# Patient Record
Sex: MALE | Race: WHITE | Employment: OTHER | URBAN - METROPOLITAN AREA
[De-identification: names, ages, dates, MRNs, and addresses within clinical notes are randomized per-mention and may not be internally consistent; named-entity substitution may affect disease eponyms.]

---

## 2022-04-11 ENCOUNTER — HOSPITAL ENCOUNTER (INPATIENT)
Age: 67
LOS: 6 days | Discharge: HOME HEALTH | DRG: 871 | End: 2022-04-17
Attending: EMERGENCY MEDICINE | Admitting: INTERNAL MEDICINE
Payer: MEDICARE

## 2022-04-11 ENCOUNTER — APPOINTMENT (OUTPATIENT)
Dept: GENERAL RADIOLOGY | Age: 67
DRG: 871 | End: 2022-04-11
Attending: EMERGENCY MEDICINE
Payer: MEDICARE

## 2022-04-11 DIAGNOSIS — J90 PLEURAL EFFUSION: ICD-10-CM

## 2022-04-11 DIAGNOSIS — A41.9 SEPSIS, DUE TO UNSPECIFIED ORGANISM, UNSPECIFIED WHETHER ACUTE ORGAN DYSFUNCTION PRESENT (HCC): Primary | ICD-10-CM

## 2022-04-11 DIAGNOSIS — E10.49 TYPE 1 DIABETES MELLITUS WITH OTHER NEUROLOGIC COMPLICATION (HCC): ICD-10-CM

## 2022-04-11 DIAGNOSIS — J69.0 ASPIRATION PNEUMONIA OF LEFT LUNG, UNSPECIFIED ASPIRATION PNEUMONIA TYPE, UNSPECIFIED PART OF LUNG (HCC): ICD-10-CM

## 2022-04-11 LAB
ALBUMIN SERPL-MCNC: 3.6 G/DL (ref 3.5–5)
ALBUMIN/GLOB SERPL: 0.9 {RATIO} (ref 1.1–2.2)
ALP SERPL-CCNC: 156 U/L (ref 45–117)
ALT SERPL-CCNC: 51 U/L (ref 12–78)
ANION GAP SERPL CALC-SCNC: 6 MMOL/L (ref 5–15)
AST SERPL-CCNC: 26 U/L (ref 15–37)
BASOPHILS # BLD: 0 K/UL (ref 0–0.1)
BASOPHILS NFR BLD: 0 % (ref 0–1)
BILIRUB SERPL-MCNC: 0.4 MG/DL (ref 0.2–1)
BUN SERPL-MCNC: 39 MG/DL (ref 6–20)
BUN/CREAT SERPL: 34 (ref 12–20)
CALCIUM SERPL-MCNC: 9.4 MG/DL (ref 8.5–10.1)
CALCULATED R AXIS, ECG10: 71 DEGREES
CALCULATED T AXIS, ECG11: 126 DEGREES
CHLORIDE SERPL-SCNC: 101 MMOL/L (ref 97–108)
CO2 SERPL-SCNC: 26 MMOL/L (ref 21–32)
CREAT SERPL-MCNC: 1.16 MG/DL (ref 0.7–1.3)
DIAGNOSIS, 93000: NORMAL
DIFFERENTIAL METHOD BLD: ABNORMAL
EOSINOPHIL # BLD: 0.2 K/UL (ref 0–0.4)
EOSINOPHIL NFR BLD: 2 % (ref 0–7)
ERYTHROCYTE [DISTWIDTH] IN BLOOD BY AUTOMATED COUNT: 14.6 % (ref 11.5–14.5)
GLOBULIN SER CALC-MCNC: 4.2 G/DL (ref 2–4)
GLUCOSE BLD STRIP.AUTO-MCNC: 170 MG/DL (ref 65–117)
GLUCOSE SERPL-MCNC: 191 MG/DL (ref 65–100)
HCT VFR BLD AUTO: 33 % (ref 36.6–50.3)
HGB BLD-MCNC: 11.1 G/DL (ref 12.1–17)
IMM GRANULOCYTES # BLD AUTO: 0 K/UL (ref 0–0.04)
IMM GRANULOCYTES NFR BLD AUTO: 0 % (ref 0–0.5)
LACTATE SERPL-SCNC: 1 MMOL/L (ref 0.4–2)
LYMPHOCYTES # BLD: 1.8 K/UL (ref 0.8–3.5)
LYMPHOCYTES NFR BLD: 21 % (ref 12–49)
MCH RBC QN AUTO: 29 PG (ref 26–34)
MCHC RBC AUTO-ENTMCNC: 33.6 G/DL (ref 30–36.5)
MCV RBC AUTO: 86.2 FL (ref 80–99)
MONOCYTES # BLD: 0.4 K/UL (ref 0–1)
MONOCYTES NFR BLD: 5 % (ref 5–13)
NEUTS SEG # BLD: 6.2 K/UL (ref 1.8–8)
NEUTS SEG NFR BLD: 72 % (ref 32–75)
NRBC # BLD: 0 K/UL (ref 0–0.01)
NRBC BLD-RTO: 0 PER 100 WBC
PLATELET # BLD AUTO: 232 K/UL (ref 150–400)
PMV BLD AUTO: 12.7 FL (ref 8.9–12.9)
POTASSIUM SERPL-SCNC: 4 MMOL/L (ref 3.5–5.1)
PROCALCITONIN SERPL-MCNC: 0.09 NG/ML
PROT SERPL-MCNC: 7.8 G/DL (ref 6.4–8.2)
Q-T INTERVAL, ECG07: 336 MS
QRS DURATION, ECG06: 70 MS
QTC CALCULATION (BEZET), ECG08: 446 MS
RBC # BLD AUTO: 3.83 M/UL (ref 4.1–5.7)
SERVICE CMNT-IMP: ABNORMAL
SODIUM SERPL-SCNC: 133 MMOL/L (ref 136–145)
TROPONIN-HIGH SENSITIVITY: 43 NG/L (ref 0–76)
VENTRICULAR RATE, ECG03: 106 BPM
WBC # BLD AUTO: 8.6 K/UL (ref 4.1–11.1)

## 2022-04-11 PROCEDURE — 87040 BLOOD CULTURE FOR BACTERIA: CPT

## 2022-04-11 PROCEDURE — 83605 ASSAY OF LACTIC ACID: CPT

## 2022-04-11 PROCEDURE — 80053 COMPREHEN METABOLIC PANEL: CPT

## 2022-04-11 PROCEDURE — 96374 THER/PROPH/DIAG INJ IV PUSH: CPT

## 2022-04-11 PROCEDURE — 84145 PROCALCITONIN (PCT): CPT

## 2022-04-11 PROCEDURE — 93005 ELECTROCARDIOGRAM TRACING: CPT

## 2022-04-11 PROCEDURE — 87186 SC STD MICRODIL/AGAR DIL: CPT

## 2022-04-11 PROCEDURE — 94761 N-INVAS EAR/PLS OXIMETRY MLT: CPT

## 2022-04-11 PROCEDURE — 99285 EMERGENCY DEPT VISIT HI MDM: CPT

## 2022-04-11 PROCEDURE — 85025 COMPLETE CBC W/AUTO DIFF WBC: CPT

## 2022-04-11 PROCEDURE — 84484 ASSAY OF TROPONIN QUANT: CPT

## 2022-04-11 PROCEDURE — 36415 COLL VENOUS BLD VENIPUNCTURE: CPT

## 2022-04-11 PROCEDURE — 65270000029 HC RM PRIVATE

## 2022-04-11 PROCEDURE — 82962 GLUCOSE BLOOD TEST: CPT

## 2022-04-11 PROCEDURE — 74011250636 HC RX REV CODE- 250/636: Performed by: EMERGENCY MEDICINE

## 2022-04-11 PROCEDURE — 71045 X-RAY EXAM CHEST 1 VIEW: CPT

## 2022-04-11 PROCEDURE — 74011000258 HC RX REV CODE- 258: Performed by: EMERGENCY MEDICINE

## 2022-04-11 RX ORDER — TRAZODONE HYDROCHLORIDE 50 MG/1
125 TABLET ORAL ONCE
Status: COMPLETED | OUTPATIENT
Start: 2022-04-11 | End: 2022-04-12

## 2022-04-11 RX ORDER — ONDANSETRON 4 MG/1
4 TABLET, ORALLY DISINTEGRATING ORAL
Status: DISCONTINUED | OUTPATIENT
Start: 2022-04-11 | End: 2022-04-17 | Stop reason: HOSPADM

## 2022-04-11 RX ORDER — ACETAMINOPHEN 325 MG/1
650 TABLET ORAL
Status: DISCONTINUED | OUTPATIENT
Start: 2022-04-11 | End: 2022-04-17 | Stop reason: HOSPADM

## 2022-04-11 RX ORDER — ONDANSETRON 2 MG/ML
4 INJECTION INTRAMUSCULAR; INTRAVENOUS
Status: DISCONTINUED | OUTPATIENT
Start: 2022-04-11 | End: 2022-04-17 | Stop reason: HOSPADM

## 2022-04-11 RX ORDER — SODIUM CHLORIDE 0.9 % (FLUSH) 0.9 %
5-40 SYRINGE (ML) INJECTION EVERY 8 HOURS
Status: DISCONTINUED | OUTPATIENT
Start: 2022-04-11 | End: 2022-04-17 | Stop reason: HOSPADM

## 2022-04-11 RX ORDER — INSULIN LISPRO 100 [IU]/ML
INJECTION, SOLUTION INTRAVENOUS; SUBCUTANEOUS
Status: DISCONTINUED | OUTPATIENT
Start: 2022-04-12 | End: 2022-04-17 | Stop reason: HOSPADM

## 2022-04-11 RX ORDER — VANCOMYCIN 2 GRAM/500 ML IN 0.9 % SODIUM CHLORIDE INTRAVENOUS
2000 ONCE
Status: DISCONTINUED | OUTPATIENT
Start: 2022-04-11 | End: 2022-04-11

## 2022-04-11 RX ORDER — VANCOMYCIN/0.9 % SOD CHLORIDE 1.5G/250ML
1500 PLASTIC BAG, INJECTION (ML) INTRAVENOUS ONCE
Status: COMPLETED | OUTPATIENT
Start: 2022-04-12 | End: 2022-04-12

## 2022-04-11 RX ORDER — SODIUM CHLORIDE 0.9 % (FLUSH) 0.9 %
5-10 SYRINGE (ML) INJECTION AS NEEDED
Status: DISCONTINUED | OUTPATIENT
Start: 2022-04-11 | End: 2022-04-17 | Stop reason: HOSPADM

## 2022-04-11 RX ORDER — FACIAL-BODY WIPES
10 EACH TOPICAL DAILY PRN
Status: DISCONTINUED | OUTPATIENT
Start: 2022-04-11 | End: 2022-04-17 | Stop reason: HOSPADM

## 2022-04-11 RX ORDER — SODIUM CHLORIDE 0.9 % (FLUSH) 0.9 %
5-40 SYRINGE (ML) INJECTION AS NEEDED
Status: DISCONTINUED | OUTPATIENT
Start: 2022-04-11 | End: 2022-04-17 | Stop reason: HOSPADM

## 2022-04-11 RX ORDER — MAGNESIUM SULFATE 100 %
4 CRYSTALS MISCELLANEOUS AS NEEDED
Status: DISCONTINUED | OUTPATIENT
Start: 2022-04-11 | End: 2022-04-17 | Stop reason: HOSPADM

## 2022-04-11 RX ORDER — ACETAMINOPHEN 650 MG/1
650 SUPPOSITORY RECTAL
Status: DISCONTINUED | OUTPATIENT
Start: 2022-04-11 | End: 2022-04-17 | Stop reason: HOSPADM

## 2022-04-11 RX ADMIN — PIPERACILLIN SODIUM AND TAZOBACTAM SODIUM 4.5 G: 4; .5 INJECTION, POWDER, LYOPHILIZED, FOR SOLUTION INTRAVENOUS at 20:45

## 2022-04-11 RX ADMIN — SODIUM CHLORIDE 1000 ML: 9 INJECTION, SOLUTION INTRAVENOUS at 20:33

## 2022-04-11 NOTE — ED TRIAGE NOTES
He arrives in a wheelchair with his wife. He was at [facility] for hours waiting for a room. He was dx there with pneumonia. He has a hx of a stroke, with left sided deficits. He has a PEG tube. He vomited 3 times this am after having low blood sugar and getting treated with orange juice.

## 2022-04-11 NOTE — ED PROVIDER NOTES
72-year-old male with a history of diabetes, atrial fibrillation on Eliquis, prior stroke and hemorrhagic stroke presents with a chief complaint of vomiting. Patient's wife states that he woke up in the middle of the night with low blood sugar last night. She gave him some orange juice. He vomited multiple times after that. This morning he had of temperature of 99 °F temporally. He was seen at [facility] prior to arrival and had CT scanning of his chest and abdomen. There was concern for aspiration pneumonia on the CT of the chest.  Patient endorses a cough but denies being short of breath. No past medical history on file. No past surgical history on file. No family history on file. Social History     Socioeconomic History    Marital status:      Spouse name: Not on file    Number of children: Not on file    Years of education: Not on file    Highest education level: Not on file   Occupational History    Not on file   Tobacco Use    Smoking status: Not on file    Smokeless tobacco: Not on file   Substance and Sexual Activity    Alcohol use: Not on file    Drug use: Not on file    Sexual activity: Not on file   Other Topics Concern    Not on file   Social History Narrative    Not on file     Social Determinants of Health     Financial Resource Strain:     Difficulty of Paying Living Expenses: Not on file   Food Insecurity:     Worried About Running Out of Food in the Last Year: Not on file    Nickolas of Food in the Last Year: Not on file   Transportation Needs:     Lack of Transportation (Medical): Not on file    Lack of Transportation (Non-Medical):  Not on file   Physical Activity:     Days of Exercise per Week: Not on file    Minutes of Exercise per Session: Not on file   Stress:     Feeling of Stress : Not on file   Social Connections:     Frequency of Communication with Friends and Family: Not on file    Frequency of Social Gatherings with Friends and Family: Not on file    Attends Holiness Services: Not on file    Active Member of Clubs or Organizations: Not on file    Attends Club or Organization Meetings: Not on file    Marital Status: Not on file   Intimate Partner Violence:     Fear of Current or Ex-Partner: Not on file    Emotionally Abused: Not on file    Physically Abused: Not on file    Sexually Abused: Not on file   Housing Stability:     Unable to Pay for Housing in the Last Year: Not on file    Number of [place] in the Last Year: Not on file    Unstable Housing in the Last Year: Not on file         ALLERGIES: Patient has no known allergies. Review of Systems   Constitutional: Positive for chills. HENT: Negative for rhinorrhea. Respiratory: Positive for cough. Cardiovascular: Negative for chest pain. Gastrointestinal: Positive for abdominal pain and vomiting. Genitourinary: Negative for dysuria. Musculoskeletal: Negative for back pain. Skin: Negative for wound. Neurological: Negative for headaches. Psychiatric/Behavioral: Negative for confusion. There were no vitals filed for this visit. Physical Exam  Vitals and nursing note reviewed. Constitutional:       General: He is not in acute distress. Appearance: Normal appearance. He is not ill-appearing, toxic-appearing or diaphoretic. HENT:      Head: Normocephalic. Mouth/Throat:      Mouth: Mucous membranes are moist.   Eyes:      Extraocular Movements: Extraocular movements intact. Cardiovascular:      Rate and Rhythm: Tachycardia present. Rhythm irregular. Pulses: Normal pulses. Heart sounds: Normal heart sounds. Pulmonary:      Effort: Pulmonary effort is normal. No respiratory distress. Breath sounds: Wheezing present. Abdominal:      General: There is no distension. Tenderness: There is no abdominal tenderness. Comments: PEG tube   Musculoskeletal:         General: Normal range of motion. Cervical back: Normal range of motion. Skin:     General: Skin is dry. Capillary Refill: Capillary refill takes less than 2 seconds. Neurological:      Mental Status: He is alert and oriented to person, place, and time. Psychiatric:         Mood and Affect: Mood normal.          MDM  Number of Diagnoses or Management Options  Aspiration pneumonia of left lung, unspecified aspiration pneumonia type, unspecified part of lung (HCC)  Pleural effusion  Sepsis, due to unspecified organism, unspecified whether acute organ dysfunction present [facility])  Diagnosis management comments:   Patient presents with concern for aspiration pneumonia. Patient is tachypneic and tachycardic here in the ED. Given the CT findings from [facility] this is concerning for sepsis. Patient started on broad-spectrum antibiotics and will be admitted to hospital medicine for further management. EKG shows atrial fibrillation at a rate of 106, otherwise normal intervals, normal axis, no ischemic changes    Reviewed of records from [facility] from this evening. Patient received a CT of the chest abdomen and pelvis with IV contrast.  The CT of the chest demonstrated a left perihilar and left lower lobe pneumonia. There is also noted to be a left pleural effusion. CT of the abdomen and pelvis showed a PEG tube in good position of the stomach with no acute process noted. Laboratory studies show a normal white blood cell count and lactic acid.       Perfect Serve Consult for Admission  7:48 PM    ED Room Number: YY43/96  Patient Name and age:  Chloe Joy 77 y.o.  male  Working Diagnosis: Sepsis, due to unspecified organism, unspecified whether acute organ dysfunction present [facility])  (primary encounter diagnosis)  Aspiration pneumonia of left lung, unspecified aspiration pneumonia type, unspecified part of lung ([address]  Pleural effusion    COVID-19 Suspicion:  no  Sepsis present:  yes  Reassessment needed: no  Code Status:  Full Code  Readmission: no  Isolation Requirements:  no  Recommended Level of Care:  telemetry  Department:[facility] Adult ED - 21   Other: Patient seen [facility] and diagnosed with aspiration pneumonia on CT chest.  Tachycardic, tachypneic, concern for sepsis.   Patient signed out from [facility] due to wait times         Amount and/or Complexity of Data Reviewed  Clinical lab tests: reviewed and ordered  Tests in the radiology section of CPT®: ordered and reviewed           Procedures

## 2022-04-12 LAB
ALBUMIN SERPL-MCNC: 2.8 G/DL (ref 3.5–5)
ALBUMIN/GLOB SERPL: 0.7 {RATIO} (ref 1.1–2.2)
ALP SERPL-CCNC: 120 U/L (ref 45–117)
ALT SERPL-CCNC: 43 U/L (ref 12–78)
ANION GAP SERPL CALC-SCNC: 10 MMOL/L (ref 5–15)
AST SERPL-CCNC: 24 U/L (ref 15–37)
BASOPHILS # BLD: 0.1 K/UL (ref 0–0.1)
BASOPHILS NFR BLD: 1 % (ref 0–1)
BILIRUB SERPL-MCNC: 0.4 MG/DL (ref 0.2–1)
BNP SERPL-MCNC: 8201 PG/ML
BUN SERPL-MCNC: 34 MG/DL (ref 6–20)
BUN/CREAT SERPL: 35 (ref 12–20)
CALCIUM SERPL-MCNC: 8.8 MG/DL (ref 8.5–10.1)
CHLORIDE SERPL-SCNC: 104 MMOL/L (ref 97–108)
CO2 SERPL-SCNC: 22 MMOL/L (ref 21–32)
CREAT SERPL-MCNC: 0.98 MG/DL (ref 0.7–1.3)
DIFFERENTIAL METHOD BLD: ABNORMAL
EOSINOPHIL # BLD: 0.2 K/UL (ref 0–0.4)
EOSINOPHIL NFR BLD: 3 % (ref 0–7)
ERYTHROCYTE [DISTWIDTH] IN BLOOD BY AUTOMATED COUNT: 14.6 % (ref 11.5–14.5)
EST. AVERAGE GLUCOSE BLD GHB EST-MCNC: 180 MG/DL
GLOBULIN SER CALC-MCNC: 4.1 G/DL (ref 2–4)
GLUCOSE BLD STRIP.AUTO-MCNC: 120 MG/DL (ref 65–117)
GLUCOSE BLD STRIP.AUTO-MCNC: 136 MG/DL (ref 65–117)
GLUCOSE BLD STRIP.AUTO-MCNC: 138 MG/DL (ref 65–117)
GLUCOSE BLD STRIP.AUTO-MCNC: 237 MG/DL (ref 65–117)
GLUCOSE SERPL-MCNC: 153 MG/DL (ref 65–100)
HBA1C MFR BLD: 7.9 % (ref 4–5.6)
HCT VFR BLD AUTO: 29.9 % (ref 36.6–50.3)
HGB BLD-MCNC: 9.8 G/DL (ref 12.1–17)
IMM GRANULOCYTES # BLD AUTO: 0 K/UL (ref 0–0.04)
IMM GRANULOCYTES NFR BLD AUTO: 0 % (ref 0–0.5)
LIPASE SERPL-CCNC: 90 U/L (ref 73–393)
LYMPHOCYTES # BLD: 1.2 K/UL (ref 0.8–3.5)
LYMPHOCYTES NFR BLD: 17 % (ref 12–49)
MAGNESIUM SERPL-MCNC: 2.2 MG/DL (ref 1.6–2.4)
MCH RBC QN AUTO: 28.7 PG (ref 26–34)
MCHC RBC AUTO-ENTMCNC: 32.8 G/DL (ref 30–36.5)
MCV RBC AUTO: 87.7 FL (ref 80–99)
MONOCYTES # BLD: 0.4 K/UL (ref 0–1)
MONOCYTES NFR BLD: 6 % (ref 5–13)
NEUTS SEG # BLD: 5.1 K/UL (ref 1.8–8)
NEUTS SEG NFR BLD: 73 % (ref 32–75)
NRBC # BLD: 0 K/UL (ref 0–0.01)
NRBC BLD-RTO: 0 PER 100 WBC
PHOSPHATE SERPL-MCNC: 2.6 MG/DL (ref 2.6–4.7)
PLATELET # BLD AUTO: 181 K/UL (ref 150–400)
PMV BLD AUTO: 12.4 FL (ref 8.9–12.9)
POTASSIUM SERPL-SCNC: 3.7 MMOL/L (ref 3.5–5.1)
PROT SERPL-MCNC: 6.9 G/DL (ref 6.4–8.2)
RBC # BLD AUTO: 3.41 M/UL (ref 4.1–5.7)
SERVICE CMNT-IMP: ABNORMAL
SODIUM SERPL-SCNC: 136 MMOL/L (ref 136–145)
TROPONIN-HIGH SENSITIVITY: 51 NG/L (ref 0–76)
TSH SERPL DL<=0.05 MIU/L-ACNC: 3.44 UIU/ML (ref 0.36–3.74)
WBC # BLD AUTO: 7 K/UL (ref 4.1–11.1)

## 2022-04-12 PROCEDURE — 99233 SBSQ HOSP IP/OBS HIGH 50: CPT | Performed by: CLINICAL NURSE SPECIALIST

## 2022-04-12 PROCEDURE — 83690 ASSAY OF LIPASE: CPT

## 2022-04-12 PROCEDURE — 99356 PR PROLONGED SVC I/P OR OBS SETTING 1ST HOUR: CPT | Performed by: CLINICAL NURSE SPECIALIST

## 2022-04-12 PROCEDURE — 74011250637 HC RX REV CODE- 250/637: Performed by: INTERNAL MEDICINE

## 2022-04-12 PROCEDURE — 65270000029 HC RM PRIVATE

## 2022-04-12 PROCEDURE — 74011000250 HC RX REV CODE- 250: Performed by: INTERNAL MEDICINE

## 2022-04-12 PROCEDURE — 83735 ASSAY OF MAGNESIUM: CPT

## 2022-04-12 PROCEDURE — 74011000258 HC RX REV CODE- 258: Performed by: INTERNAL MEDICINE

## 2022-04-12 PROCEDURE — 36415 COLL VENOUS BLD VENIPUNCTURE: CPT

## 2022-04-12 PROCEDURE — 92610 EVALUATE SWALLOWING FUNCTION: CPT | Performed by: SPEECH-LANGUAGE PATHOLOGIST

## 2022-04-12 PROCEDURE — 83880 ASSAY OF NATRIURETIC PEPTIDE: CPT

## 2022-04-12 PROCEDURE — 82962 GLUCOSE BLOOD TEST: CPT

## 2022-04-12 PROCEDURE — 83036 HEMOGLOBIN GLYCOSYLATED A1C: CPT

## 2022-04-12 PROCEDURE — 84484 ASSAY OF TROPONIN QUANT: CPT

## 2022-04-12 PROCEDURE — 84443 ASSAY THYROID STIM HORMONE: CPT

## 2022-04-12 PROCEDURE — 80053 COMPREHEN METABOLIC PANEL: CPT

## 2022-04-12 PROCEDURE — 74011636637 HC RX REV CODE- 636/637: Performed by: INTERNAL MEDICINE

## 2022-04-12 PROCEDURE — 74011250636 HC RX REV CODE- 250/636: Performed by: INTERNAL MEDICINE

## 2022-04-12 PROCEDURE — 84100 ASSAY OF PHOSPHORUS: CPT

## 2022-04-12 PROCEDURE — 85025 COMPLETE CBC W/AUTO DIFF WBC: CPT

## 2022-04-12 RX ORDER — CARVEDILOL 6.25 MG/1
6.25 TABLET ORAL 2 TIMES DAILY WITH MEALS
Status: DISCONTINUED | OUTPATIENT
Start: 2022-04-12 | End: 2022-04-12

## 2022-04-12 RX ORDER — ATORVASTATIN CALCIUM 40 MG/1
40 TABLET, FILM COATED ORAL DAILY
Status: DISCONTINUED | OUTPATIENT
Start: 2022-04-12 | End: 2022-04-17 | Stop reason: HOSPADM

## 2022-04-12 RX ORDER — CARVEDILOL 12.5 MG/1
25 TABLET ORAL 2 TIMES DAILY WITH MEALS
Status: DISCONTINUED | OUTPATIENT
Start: 2022-04-12 | End: 2022-04-17 | Stop reason: HOSPADM

## 2022-04-12 RX ORDER — IPRATROPIUM BROMIDE AND ALBUTEROL SULFATE 2.5; .5 MG/3ML; MG/3ML
3 SOLUTION RESPIRATORY (INHALATION)
Status: DISCONTINUED | OUTPATIENT
Start: 2022-04-12 | End: 2022-04-17 | Stop reason: HOSPADM

## 2022-04-12 RX ORDER — LEVOTHYROXINE SODIUM 50 UG/1
50 TABLET ORAL
Status: DISCONTINUED | OUTPATIENT
Start: 2022-04-12 | End: 2022-04-17 | Stop reason: HOSPADM

## 2022-04-12 RX ORDER — SODIUM CHLORIDE, SODIUM LACTATE, POTASSIUM CHLORIDE, CALCIUM CHLORIDE 600; 310; 30; 20 MG/100ML; MG/100ML; MG/100ML; MG/100ML
125 INJECTION, SOLUTION INTRAVENOUS CONTINUOUS
Status: DISCONTINUED | OUTPATIENT
Start: 2022-04-12 | End: 2022-04-12

## 2022-04-12 RX ORDER — INSULIN GLARGINE 100 [IU]/ML
12 INJECTION, SOLUTION SUBCUTANEOUS
Status: DISCONTINUED | OUTPATIENT
Start: 2022-04-12 | End: 2022-04-13

## 2022-04-12 RX ORDER — HYDRALAZINE HYDROCHLORIDE 20 MG/ML
10 INJECTION INTRAMUSCULAR; INTRAVENOUS
Status: DISCONTINUED | OUTPATIENT
Start: 2022-04-12 | End: 2022-04-17 | Stop reason: HOSPADM

## 2022-04-12 RX ORDER — VANCOMYCIN/0.9 % SOD CHLORIDE 750 MG/250
750 PLASTIC BAG, INJECTION (ML) INTRAVENOUS EVERY 12 HOURS
Status: DISCONTINUED | OUTPATIENT
Start: 2022-04-12 | End: 2022-04-14

## 2022-04-12 RX ADMIN — CARVEDILOL 25 MG: 12.5 TABLET, FILM COATED ORAL at 10:07

## 2022-04-12 RX ADMIN — PIPERACILLIN SODIUM AND TAZOBACTAM SODIUM 3.38 G: 3; 375 INJECTION, POWDER, FOR SOLUTION INTRAVENOUS at 05:58

## 2022-04-12 RX ADMIN — Medication 3 UNITS: at 18:29

## 2022-04-12 RX ADMIN — SODIUM CHLORIDE, PRESERVATIVE FREE 10 ML: 5 INJECTION INTRAVENOUS at 21:43

## 2022-04-12 RX ADMIN — INSULIN GLARGINE 12 UNITS: 100 INJECTION, SOLUTION SUBCUTANEOUS at 15:26

## 2022-04-12 RX ADMIN — DOXYCYCLINE 100 MG: 100 INJECTION, POWDER, LYOPHILIZED, FOR SOLUTION INTRAVENOUS at 04:29

## 2022-04-12 RX ADMIN — PIPERACILLIN SODIUM AND TAZOBACTAM SODIUM 3.38 G: 3; 375 INJECTION, POWDER, FOR SOLUTION INTRAVENOUS at 21:42

## 2022-04-12 RX ADMIN — TRAZODONE HYDROCHLORIDE 125 MG: 50 TABLET ORAL at 00:27

## 2022-04-12 RX ADMIN — ATORVASTATIN CALCIUM 40 MG: 40 TABLET, FILM COATED ORAL at 10:07

## 2022-04-12 RX ADMIN — APIXABAN 5 MG: 5 TABLET, FILM COATED ORAL at 10:07

## 2022-04-12 RX ADMIN — SODIUM CHLORIDE, POTASSIUM CHLORIDE, SODIUM LACTATE AND CALCIUM CHLORIDE 125 ML/HR: 600; 310; 30; 20 INJECTION, SOLUTION INTRAVENOUS at 03:55

## 2022-04-12 RX ADMIN — PIPERACILLIN SODIUM AND TAZOBACTAM SODIUM 3.38 G: 3; 375 INJECTION, POWDER, FOR SOLUTION INTRAVENOUS at 12:37

## 2022-04-12 RX ADMIN — ONDANSETRON 4 MG: 2 INJECTION INTRAMUSCULAR; INTRAVENOUS at 03:55

## 2022-04-12 RX ADMIN — DOXYCYCLINE 100 MG: 100 INJECTION, POWDER, LYOPHILIZED, FOR SOLUTION INTRAVENOUS at 12:32

## 2022-04-12 RX ADMIN — SODIUM CHLORIDE, PRESERVATIVE FREE 10 ML: 5 INJECTION INTRAVENOUS at 15:27

## 2022-04-12 RX ADMIN — VANCOMYCIN HYDROCHLORIDE 750 MG: 750 INJECTION, POWDER, LYOPHILIZED, FOR SOLUTION INTRAVENOUS at 18:31

## 2022-04-12 RX ADMIN — APIXABAN 5 MG: 5 TABLET, FILM COATED ORAL at 21:43

## 2022-04-12 RX ADMIN — VANCOMYCIN HYDROCHLORIDE 1500 MG: 10 INJECTION, POWDER, LYOPHILIZED, FOR SOLUTION INTRAVENOUS at 04:39

## 2022-04-12 RX ADMIN — LEVOTHYROXINE SODIUM 50 MCG: 0.05 TABLET ORAL at 10:09

## 2022-04-12 RX ADMIN — CARVEDILOL 25 MG: 12.5 TABLET, FILM COATED ORAL at 18:34

## 2022-04-12 RX ADMIN — ACETAMINOPHEN 650 MG: 650 SUPPOSITORY RECTAL at 21:45

## 2022-04-12 NOTE — PROGRESS NOTES
TRANSITION OF CARE  RUR--13%  Disposition-- Resume home health SN, PT,OT services with [facility]. Will need JADE order prior to discharge. Consult: Speech  Transport--Family    Patient's primary family contact: spouse [place] [employer]    CM gave patient first Medicare IM Letter which informed patient of the right to appeal the discharge. Patient signed the original which was given to the patient and a copy was placed on the chart. Care Management Interventions  PCP Verified by CM: Yes  Transition of Care Consult (CM Consult): [address]: No  Reason Outside [place]: Patient already serviced by other home care/hospice agency  Physical Therapy Consult: No  Occupational Therapy Consult: No  Speech Therapy Consult: No  Support Systems: Spouse/Significant Other  Confirm Follow Up Transport: Family  The Plan for Transition of Care is Related to the Following Treatment Goals : Resume home health services. Discharge Location  Patient Expects to be Discharged to[de-identified] Home with home health    Reason for Admission:  Suspected sepsis. Bacterial pneumonia            Dysphasia and bilateral deficits  secondary to CVA/ICH-- s/p PEG tube                   RUR Score:            13%           Plan for utilizing home health:      Resume with [facility]    PCP: First and Last name:  Alem Eldridge MD   Name of Practice:    Are you a current patient: Yes    Approximate date of last visit: one week ago   Can you participate in a virtual visit with your PCP:                     Current Advanced Directive/Advance Care Plan: Full Code    Healthcare Decision Maker:   Click here to complete [address] including selection of the Healthcare Decision Maker Relationship (ie \"Primary\")                         Transition of Care Plan:                    CM met with patient.  Patient lives with his wife  Patient has 5 supportive siblings who live locally and a daughter in [place]. Patient reports good family /social support. Patient is mobile with wheelchair. Patient confirmed PCP, health insurance, and prescription coverage.    Previous home health : open to [facility]  Previous IPR/ SNF rehab : over 15 years ago- can not remember the provider  DME : wheelchair

## 2022-04-12 NOTE — DIABETES MGMT
[facility]    CLINICAL NURSE SPECIALIST CONSULT     Initial Presentation   Dada Horvath is a 77 y.o. male who presented to the ED 4/11/22 with recurrent vomiting, low grade temperature and low blood sugar (70). He was seen at an OSH ED for these symptoms and CT there was concerning for aspiration pneumonia. He left the OSH ED AMA s/t long waiting time and presented for evaluation at [facility] ED. In the ED, he was tachycardic, tapchypnic and sepsis protocol initiated. The patient received fluid therapy, broad-spectrum antibiotics, and was referred to the hospitalist service for evaluation for admission    HX:   Past Medical History:   Diagnosis Date    Atrial fibrillation ([facility]     Diabetes ([address]     Hypertension     Rheumatoid arthritis ([facility]     Stroke ([facility]     hemorrhagic         INITIAL DX:   Sepsis ([facility] [A41.9]     Current Treatment     TX: IV antibiotics    Consulted by Monika Ying MD for advanced diabetes nursing assessment and care for:   [x] Inpatient management strategy  Hospital Course   Clinical progress has been uncomplicated. 4/11: Admission. CT with left perihilar and lower lung airspace disease. Diabetes History   Type 1 Diabetes- Diagnosed 45 years ago  Ambulatory BG management provided by endocrinologist, Alfred Geller MD  Current A1C 7.8%    Diabetes-related Medical History  Neurological complications  Peripheral neuropathy  Macrovascular disease  Cerebral vascular accident  Other associated conditions     PEG, hypothyroidism     Diabetes Medication History  Lantus: 12 units daily  Humalog: Sliding scale    Diabetes self-management practices:   Eating pattern  Jevity 1.5, 5 bolus feeds/day  Physical activity pattern  Wheelchair bound   Monitoring pattern  Wearing Dexcom CGM.   BG in the am usually around 113, daytime -230s, can get as high as 300s  Unusual for his BG to dip below 80s- has an alarm that sounds when his BG dips below 100  Taking medications pattern  [x] Consistent administration   [x] Affordable    Social determinants of health impacting diabetes self-management practices   Concerned that you need to know more about how to stay healthy with diabetes    Overall evaluation:    [x] Achieving A1c target with drug therapy & self-care practices    Subjective   I need to re-start the lantus.      Hx CVA, now wheelchair bound with PEG  , lives   Objective   Physical exam  General Normal weight male in no acute distress, chronically ill appearing. Weak, chronic slurred speech- baseline but able to converse  Neuro  Alert, oriented   Vital Signs   Visit Vitals  /74   Pulse 92   Temp 97.4 °F (36.3 °C)   Resp 18   Ht 5' 11\" (1.803 m)   Wt 69.4 kg (153 lb)   SpO2 93%   BMI 21.34 kg/m²     Skin  Warm and dry. Acanthosis noted along neckline. No lipohypertrophy or lipoatrophy noted at injection sites   Heart   Regular rate and rhythm.  No murmurs, rubs or gallops  Lungs  Clear to auscultation without rales or rhonchi  Extremities No foot wounds, left transmetarsal amputation        Laboratory  Recent Labs     22  0420 22  1944 22  1915   *  --  191*   AGAP 10  --  6   WBC 7.0 8.6  --    CREA 0.98  --  1.16   GFRNA >60  --  >60   AST 24  --  26   ALT 43  --  51       Factors impacting BG management  Factor Dose Comments   Nutrition:  Tube feeding via PEG     284 mL of Jevity 1.5, 5 times/day     Infection Suspicion for aspiration PNA  Blood Cx NGTD  IV antibiotics       Blood glucose pattern      Significant diabetes-related events over the past 24-72 hours  Fasting B  Pre-prandial B-170  A1C 7.9%  Blood cx NGTD    Assessment and Plan   Nursing Diagnosis Risk for unstable blood glucose pattern   Nursing Intervention Domain 0168 Decision-making Support   Nursing Interventions Examined current inpatient diabetes/blood glucose control   Explored factors facilitating and impeding inpatient management  Explored corrective strategies with patient and responsible inpatient provider   Informed patient of rational for insulin strategy while hospitalized     Evaluation   Lamine Huitron \"Santillan\" Sparkle Romero is a 77year old gentleman, with Type 1 Diabetes with complications including foot wounds s/p L TMA amputation, gastroparesis and hemorrhagic CVA, admitted with aspiration pneumonia. A1C on admission is 7.9%. BG on admission was 191 and BG has been 120-170 in the past 24h. Bolus feeds will start now and he will need to resume his home PTA basal insulin doses. Inpatient glucose goal is closer to 180 given decreased mobility and ability to communicate. Recommendations   1. POC glucose ACHS and overnight. Kashmir Alvarenga for him to continue wearing his dexcom CGM but all insulin doses will be made off of hospital POC values. 2. Feeds per dietician     3. Start the subcutaneous Insulin Order set (4277): Insulin Dosing Specific recommendation   Basal                                      (Based on weight, BMI & GFR) 12 units Lantus daily, first dose now    Corrective                                       (Useful in adjusting insulin dosing) [x] Normal sensitivity: ACHS      If vomiting continues, consider GI referral given hx gastroparesis. Billing Code(s)   [x] 70061/99276  Before making these care recommendations, I personally reviewed the hospitalization record, including notes, laboratory & diagnostic data and current medications, and examined the patient at the bedside (circumstances permitting) before making care recommendations. More than fifty (50) percent of the time was spent in patient counseling and/or care coordination.   Total minutes: 79      ALO Ascencio  Diabetes Clinical Nurse Specialist  [facility]  Access via [employer]

## 2022-04-12 NOTE — H&P
[address]  HISTORY AND PHYSICAL    Name:  Jeremiah Camarena  MR#:  [id number]  :  1955  ACCOUNT #:  [de-identified]  ADMIT DATE:  2022      The patient was seen, evaluated and admitted by me on 2022. PRIMARY CARE PHYSICIAN:  Calderon Moore MD    SOURCE OF INFORMATION:  The patient who is not a good historian because of his CVA with residual aphasia. Review of ED electronic medical record. CHIEF COMPLAINT:  Vomiting. HISTORY OF PRESENT ILLNESS:  This is a 70-year-old man with past medical history significant for type 2 diabetes; chronic atrial fibrillation, on Eliquis for anticoagulation; status post CVA with left-sided weakness; hypothyroidism; dyslipidemia; hypertension; and dysphagia secondary to CVA, status post PEG tube placement, who was in his usual state of health until the day of his presentation at the emergency room when the patient developed vomiting. According to report, the patient woke up in the middle of the night and found to have low blood sugar. His spouse gave him some orange juice and the patient started vomiting. The vomiting was nonbilious. The patient had multiple episodes of vomiting. The following morning, the patient's temperature was reported as 99. He was taken to [facility] for further evaluation. When the patient arrived at this hospital, CT scan of the chest, abdomen and pelvis was performed. The CT of the chest shows evidence of pneumonia. There was a concern for aspiration pneumonia. The patient was going to be admitted to [facility] for further evaluation and treatment, but the patient left the hospital against medical advice because the wait was too long for him. He then came to [facility] emergency room for re-evaluation. When the patient arrived at the emergency room, the patient has tachycardia, tachypnea. There was a concern for sepsis. The sepsis protocol was initiated. The patient received fluid therapy, broad-spectrum antibiotics, and was referred to the hospitalist service for evaluation for admission. No record of prior admission to this hospital.    PAST MEDICAL HISTORY:  1. Hypertension. 2.  Dyslipidemia. 3.  Hypothyroidism. 4.  Status post CVA with left-sided weakness. 5.  Chronic atrial fibrillation, on Eliquis for anticoagulation. 6.  Type 2 diabetes. 7.  Dysphagia secondary to CVA. ALLERGIES:  NO KNOWN DRUG ALLERGIES. MEDICATIONS:  1. Lipitor 40 mg daily. 2.  Coreg 25 mg twice daily. 3.  Eliquis 5 mg twice daily. 4.  Folic acid 1 mg daily. 5.  Synthroid 50 mcg daily. This is not the complete list of patient's home medication. FAMILY HISTORY:  This was reviewed. His father had hypertension. PAST SURGICAL HISTORY:  This is significant for transmetatarsal amputation of the left foot. SOCIAL HISTORY:  No history of alcohol or tobacco abuse. REVIEW OF SYSTEMS:  HEAD, EYES, EARS, NOSE, AND THROAT:  No headache, no dizziness, no blurring of vision, no photophobia. RESPIRATORY SYSTEM:  This is positive for cough and shortness of breath. No hemoptysis. CARDIOVASCULAR SYSTEM:  No chest pain, no orthopnea, no palpitations. GASTROINTESTINAL SYSTEM:  This is positive for nausea, vomiting, and abdominal pain. No diarrhea, no constipation. GENITOURINARY SYSTEM:  No dysuria, no urgency, no frequency. All other systems are reviewed and they are negative. PHYSICAL EXAMINATION:  GENERAL APPEARANCE:  The patient appeared ill, in moderate distress. VITAL SIGNS:  On arrival at the emergency room; temperature 97.9, pulse 105, respiratory rate 40, blood pressure 154/95, oxygen saturation 94% on room air. HEENT:  Head:  Normocephalic, atraumatic. Eyes:  Normal eye movement. No redness, no drainage, no discharge. Ears:  Normal external ears with no evidence of drainage. Nose:  No deformity, no drainage.   Mouth and Throat:  No visible oral lesion. Dry oral mucosa. NECK:  Neck is supple. No JVD, no thyromegaly. CHEST:  A few expiratory wheezing. No crackles. HEART:  Normal S1 and S2, irregularly irregular. No clinically appreciable murmur. ABDOMEN:  Soft, nontender. Normal bowel sounds. Intact PEG tube noted. CNS:  Alert. Aphasia noted, left-sided weakness are noted and present on admission. EXTREMITIES:  No edema. Pulses 2+ in the right lower extremity. MUSCULOSKELETAL SYSTEM:  Transmetatarsal amputation of the left foot noted. SKIN:  No active skin lesions seen in the exposed part of the body. PSYCHIATRY:  Unable to assess mood and affect. LYMPHATIC SYSTEM:  No cervical lymphadenopathy. DIAGNOSTIC DATA:  EKG shows atrial fibrillation and nonspecific ST and T-waves abnormalities. Chest x-ray, no acute cardiopulmonary process. CT scan of the chest done at [facility] with contrast shows left perihilar and left lower lobe pneumonia. CT of the abdomen and pelvis shows PEG tube in good position, with no acute process noted. LABORATORY DATA:  Chemistry:  Sodium 133, potassium 4.0, chloride 101, CO2 of 26, glucose 191, BUN 39, creatinine 1.16, calcium 9.4. Total bilirubin 0.4, ALT 51, AST 26, alkaline phosphatase 156, total protein 7.8, albumin level 3.6, globulin 4.2. Lactic acid level 1.0. Cardiac profile:  Troponin high-sensitivity 43. Hematology:  WBC 8.6, hemoglobin 11.1, hematocrit 33.0, platelets 666. Procalcitonin level 0.09.    ASSESSMENT:  1. Suspected sepsis. 2.  Bacterial pneumonia. 3.  Type 2 diabetes. 4.  Persistent atrial fibrillation. 5.  Status post cerebrovascular accident with left-sided weakness. 6.  Dysphagia secondary to cerebrovascular accident, status post percutaneous endoscopic gastrostomy tube placement. 7.  Hypothyroidism. 8.  Dyslipidemia. 9.  Hypertension. PLAN:  1. Suspected sepsis:  We will admit the patient for further evaluation and treatment.   We will start the patient on Zosyn and vancomycin. We will await urine and blood culture results. The sepsis is most likely coming from bacterial pneumonia which will be discussed below. We will check lipase level. 2.  Bacterial pneumonia: This is the most likely source of the patient's sepsis. We will add doxycycline to cover atypical organisms and monitor the patient closely. 3.  Type 2 diabetes: The patient will be placed on sliding scale with insulin coverage. We will check hemoglobin A1c level if one has not been checked recently. 4.  Persistent atrial fibrillation: We will monitor the patient closely. We will resume Eliquis once the patient has been cleared by speech therapist tomorrow but if the patient continues to be n.p.o., the patient may require Lovenox for anticoagulation. 5.  Status post CVA with left-sided weakness: We will continue supportive treatment. We will resume home medication once the patient is cleared by speech therapist.  6.  Dysphagia secondary to CVA, status post PEG tube placement:  The patient was given orange juice following an episode of low blood sugar. The patient has had multiple vomiting and the patient will be strictly n.p.o. until when the patient is re-evaluated by Speech Therapy. 7.  Hypothyroidism: We will resume Synthroid once cleared by Speech Therapy. 8.  Dyslipidemia: We will continue preadmission medication once the patient is no longer n.p.o.  9.  Hypertension: We will place the patient on hydralazine as needed. We will resume oral medication once the patient is cleared by the speech therapist.    OTHER ISSUES:  Code status: The patient is a full code. We will request SCD for DVT prophylaxis. The patient will resume Eliquis tomorrow once cleared by the Speech Therapy. FUNCTIONAL STATUS PRIOR TO ADMISSION:  The patient came from home. The patient is ambulatory with assistive device. COVID PRECAUTION:  The patient was wearing a face mask.   I was wearing a face mask and gloves for this patient's encounter. Sepsis reassessment completed: Patient has normal capillary refill, improved cardiopulmonary examination and moist mucous membrane.         MD RANDI [email or web address]  D:  04/12/2022 1:04  T:  04/12/2022 2:25  JOB #:  [id number]  CC:  Low Mcadams MD

## 2022-04-12 NOTE — PROGRESS NOTES
Patients peg tube assessed site dressing in place c/d/i. Placement confirmed through auscultation.  0 residual.

## 2022-04-12 NOTE — CONSULTS
Comprehensive Nutrition Assessment    Type and Reason for Visit: Initial,Consult    Nutrition Recommendations/Plan:      Resume home TF regimen:  -Give 284 mL Jevity 1.5, 5x/day with 300 mL h2o flush after each feeding  -Feeding times of ~8:30 AM, 11:30 AM, 2:30 PM, 5:30 PM, and 8:30 PM  -Do not give TF within 1 hour before/after Synthroid administration (currently scheduled at 6 AM, so should not be issue). Nutrition Assessment:     PMHx includes type 1 diabetes; chronic atrial fibrillation, on Eliquis for anticoagulation; status post CVA with left-sided weakness; hypothyroidism; dyslipidemia; hypertension; and dysphagia secondary to CVA, status post PEG tube placement, who was in his usual state of health until the day of his presentation at the emergency room when the patient developed vomiting. According to report, the patient woke up in the middle of the night and found to have low blood sugar. His spouse gave him some orange juice and the patient started vomiting. The vomiting was nonbilious. The patient had multiple episodes of vomiting. The following morning, the patient's temperature was reported as 99. He was taken to [facility] for further evaluation. When the patient arrived at this hospital, CT scan of the chest, abdomen and pelvis was performed. The CT of the chest shows evidence of pneumonia. There was a concern for aspiration pneumonia. The patient was going to be admitted to [facility] for further evaluation and treatment, but the patient left the hospital against medical advice because the wait was too long for him. He then came to [facility] emergency room for re-evaluation. When the patient arrived at the emergency room, the patient has tachycardia, tachypnea. There was a concern for sepsis. RD consulted for tube feeding management. Met with pt in room. Unsure of formula/ regimen, but able to state he takes nothing by mouth.   Called wife Bhanu Marquez who states she plans to bring printed regimen of medications, feeding schedule, etc shortly as he is a type 1 diabetic and works closely with endocrinologist.  From what I gathered, pt does a total of 6 cartons of Jevity 1.5 daily over 5 feedings which equates to ~284 mL Jevity 1.5, 5 x/day and this provides 61 gm CHO at [facility] feeding. Pt also gets 300 mL h2o with each feed. Total daily regimen provides 1422 mL, 2133 kcal, 91 gm pro, 307 gm CHO, and 1081+1257=9965 mL free H2o. Wife states he gets 12 units of Lantus at night and then she doses his mealtime Humalog based on his BG at that time, which usually is about 3-4 units. Pt wears a continuous glucose monitor. Feeding regimen and insulin discussed with diabetes RN. Diabetes RN also reports pt with hx of gastroparesis which likely further complicates his BG management. Wife reports BW at time of stroke in July 2021 was 149 lb. Wife reports pt started losing weight with initial TF regimen of only 4 cartons daily, so TF regimen was increased to 6 cartons daily. Current BW of 153 lb indicating wt gain/ adequate TF. Of note, wife brought in feeding and medication schedule that she follows closely. She gives pt's Synthroid at 7:30 AM and then first TF bolus at 8:30 AM.      Malnutrition Assessment:  Malnutrition Status: At risk for malnutrition (specify) (PEG)    Context:  Chronic illness         Nutritionally Significant Medications:   Lipitor, vibramycin, SSI, synthroid, zosyn, vanc  PRN: zofran    Estimated Daily Nutrient Needs:   Energy (kcal): 2100 (30 kcal/kg)  Wt used: Current (69.4 kg)  Protein (gm):  (1.2-1.5 gm/kg)  Wt used:     Fluid (mL/day): 1 mL/kcal       Nutrition Related Findings:   Edema:  No data recorded    Last BM: 04/12/22, Soft        Wounds:    None       Current Nutrition Therapies:  Diet: NPO  EN Order: pending. Pk Pa     Anthropometric Measures:  · Height:  5' 11\" (180.3 cm)  · Current Body Wt:  69.4 kg (153 lb) · Admission Body Wt:       · Usual Body Wt:        · Ideal Body Wt:  172 lbs:  89 %   · Adjusted Body Weight:   ; Weight Adjustment for:     · Adjusted BMI:       · BMI Category:           Wt Readings from Last 20 Encounters:   04/12/22 69.4 kg (153 lb)   12/27/10 86.2 kg (190 lb)       Nutrition Diagnosis:   · Inadequate oral intake related to cognitive or neurological impairment,swallowing difficulty as evidenced by nutrition support-enteral nutrition      Nutrition Interventions:   Food and/or Nutrient Delivery: Continue NPO,Start tube feeding  Nutrition Education and Counseling: No recommendations at this time  Coordination of Nutrition Care: Continue to monitor while inpatient,Other (specify) (diabetes RN)    Goals:  tolerate TF to meet EEN over next 7 days       Nutrition Monitoring and Evaluation:   Behavioral-Environmental Outcomes: None identified  Food/Nutrient Intake Outcomes: Enteral nutrition intake/tolerance  Physical Signs/Symptoms Outcomes: Biochemical data,Nutrition focused physical findings,Weight,GI status    Discharge Planning:    Enteral nutrition     Recent Labs     04/12/22 0420 04/11/22 1915   * 191*   BUN 34* 39*   CREA 0.98 1.16    133*   K 3.7 4.0    101   CO2 22 26   CA 8.8 9.4   PHOS 2.6  --    MG 2.2  --        Recent Labs     04/12/22 0420 04/11/22 1915   ALT 43 51   * 156*   TBILI 0.4 0.4   TP 6.9 7.8   ALB 2.8* 3.6   GLOB 4.1* 4.2*   LPSE 90  --        Recent Labs     04/11/22 2038   LAC 1.0       Recent Labs     04/12/22 0420 04/11/22  1944   WBC 7.0 8.6   HGB 9.8* 11.1*   HCT 29.9* 33.0*    232       No results found for: PREALB    No results found for: TRIGL    Recent Labs     04/12/22  1215 04/12/22  0835 04/12/22  0639 04/11/22  2206   GLUCPOC 120* 136* 138* 170*       Lab Results   Component Value Date/Time    Hemoglobin A1c 7.9 (H) 04/12/2022 04:26 AM       Pradeep Beck RD  Available via [address] [place] [address]

## 2022-04-12 NOTE — ROUTINE PROCESS
TRANSFER - OUT REPORT:    Verbal report given to Autumn Granda RN(name) on [employer]  being transferred to (unit) for routine progression of care       Report consisted of patients Situation, Background, Assessment and   Recommendations(SBAR). Information from the following report(s) SBAR, ED Summary, [facility] and Recent Results was reviewed with the receiving nurse. Lines:   Peripheral IV 04/11/22 Right Arm (Active)   Site Assessment Clean, dry, & intact 04/11/22 1958   Phlebitis Assessment 0 04/11/22 1958   Infiltration Assessment 0 04/11/22 1958   Dressing Status Clean, dry, & intact 04/11/22 1958   Dressing Type Tape;Transparent 04/11/22 1958   Hub Color/Line Status Pink;Flushed;Patent 04/11/22 1958   Action Taken Blood drawn 04/11/22 1958   Alcohol Cap Used No 04/11/22 1958        Opportunity for questions and clarification was provided.       Patient transported with:   [employer]

## 2022-04-12 NOTE — PROGRESS NOTES
[address] [facility] Adult  Hospitalist Group                                                                                          Hospitalist Progress Note  Keeley Adrian   Answering service: [phone] from in house phone        Date of Service:  2022  NAME:  Felisa Bashir  :  1955  MRN:  [id number]      Admission Summary:   Per H&P:  \"This is a 78-year-old man with past medical history significant for type 2 diabetes; chronic atrial fibrillation, on Eliquis for anticoagulation; status post CVA with left-sided weakness; hypothyroidism; dyslipidemia; hypertension; and dysphagia secondary to CVA, status post PEG tube placement, who was in his usual state of health until the day of his presentation at the emergency room when the patient developed vomiting. According to report, the patient woke up in the middle of the night and found to have low blood sugar. His spouse gave him some orange juice and the patient started vomiting. The vomiting was nonbilious. The patient had multiple episodes of vomiting. The following morning, the patient's temperature was reported as 99. He was taken to [facility] for further evaluation. When the patient arrived at this hospital, CT scan of the chest, abdomen and pelvis was performed. The CT of the chest shows evidence of pneumonia. There was a concern for aspiration pneumonia. The patient was going to be admitted to [facility] for further evaluation and treatment, but the patient left the hospital against medical advice because the wait was too long for him. He then came to [facility] emergency room for re-evaluation. When the patient arrived at the emergency room, the patient has tachycardia, tachypnea. There was a concern for sepsis. The sepsis protocol was initiated.   The patient received fluid therapy, broad-spectrum antibiotics, and was referred to the hospitalist service for evaluation for admission. No record of prior admission to this hospital.\"          Interval history / Subjective: Follow up nausea/vomiting. Patient seen and examined. Has aphasia but able to communicate somewhat. Has secretions. Reached out to his wife, via phone. Per wife, patient was given juice after found to have BG 70. Had vomiting. PEG dependent but previously was given oral. Interested in speech consult. Assessment & Plan:     Aspiration pneumonia:   -vomiting episode 4/11. CT chest with left perihilar and lower lung airspace disease  -could be be pneumonitis  -continue empiric antibiotics  -send MRSA swab  -speech consult  -nutrition consult    Type I diabetes:   -DM consultation    Atrial fibrillation:   -continue home eliquis, carvedilol    History CVA, cardioembolic   History ICH  -has both right and left sided deficits, aphasia  -continue eliquis    Hypothyroidism: home levothyroxine  HLD: home statin  HTN: BB       Code status: full   Prophylaxis: eliquis  Care Plan discussed with: patient, wife, nurse  Anticipated Disposition: 1-2 days     Hospital Problems  Date Reviewed: 4/11/2022          Codes Class Noted POA    * (Principal) Sepsis ([facility] [address] ICD-10-CM: A41.9  ICD-9-CM: 038.9, 995.91  4/11/2022 Yes                Review of Systems:   Negative unless stated above    Vital Signs:    Last 24hrs VS reviewed since prior progress note.  Most recent are:  Visit Vitals  BP (!) 149/77 (BP 1 Location: Right upper arm, BP Patient Position: At rest)   Pulse (!) 111   Temp 97.7 °F (36.5 °C)   Resp 20   Ht 5' 11\" (1.803 m)   Wt 69.4 kg (153 lb)   SpO2 93%   BMI 21.34 kg/m²         Intake/Output Summary (Last 24 hours) at 4/12/2022 0953  Last data filed at 4/12/2022 0309  Gross per 24 hour   Intake --   Output 650 ml   Net -650 ml        Physical Examination:     I had a face to face encounter with this patient and independently examined them on 4/12/2022 as outlined below:          Constitutional:  No acute distress, cooperative, pleasant    ENT:  +secretions   Resp:  Coarse bilaterally. No accessory muscle use. CV:  irregularly irregular, no murmurs, gallops, rubs    GI:  Soft, non distended, non tender. normoactive bowel sounds, no hepatosplenomegaly     Musculoskeletal:  No edema, warm, 2+ pulses throughout    Neurologic:  left sided hemiparesis, right side with weakness alert            Data Review:    Review and/or order of clinical lab test  Review and/or order of tests in the radiology section of CPT  Review and/or order of tests in the medicine section of [facility]      Labs:     Recent Labs     04/12/22 0420 04/11/22 1944   WBC 7.0 8.6   HGB 9.8* 11.1*   HCT 29.9* 33.0*    232     Recent Labs     04/12/22 0420 04/11/22 1915    133*   K 3.7 4.0    101   CO2 22 26   BUN 34* 39*   CREA 0.98 1.16   * 191*   CA 8.8 9.4   MG 2.2  --    PHOS 2.6  --      Recent Labs     04/12/22 0420 04/11/22 1915   ALT 43 51   * 156*   TBILI 0.4 0.4   TP 6.9 7.8   ALB 2.8* 3.6   GLOB 4.1* 4.2*   LPSE 90  --      No results for input(s): INR, PTP, APTT, INREXT in the last 72 hours. No results for input(s): FE, TIBC, PSAT, FERR in the last 72 hours. No results found for: FOL, RBCF   No results for input(s): PH, PCO2, PO2 in the last 72 hours. No results for input(s): CPK, CKNDX, TROIQ in the last 72 hours.     No lab exists for component: CPKMB  No results found for: CHOL, CHOLX, CHLST, CHOLV, HDL, HDLP, LDL, LDLC, DLDLP, TGLX, TRIGL, TRIGP, CHHD, CHHDX  Lab Results   Component Value Date/Time    Glucose (POC) 136 (H) 04/12/2022 08:35 AM    Glucose (POC) 138 (H) 04/12/2022 06:39 AM    Glucose (POC) 170 (H) 04/11/2022 10:06 PM    Glucose (POC) 213 (H) 12/27/2010 03:49 PM     No results found for: COLOR, APPRN, SPGRU, REFSG, MYLES, PROTU, GLUCU, KETU, BILU, UROU, CLAIRE, LEUKU, GLUKE, EPSU, BACTU, WBCU, RBCU, CASTS, UCRY      Medications Reviewed:     Current Facility-Administered Medications   Medication Dose Route Frequency    hydrALAZINE (APRESOLINE) 20 mg/mL injection 10 mg  10 mg IntraVENous Q6H PRN    vancomycin (VANCOCIN) 750 mg in  mL infusion  750 mg IntraVENous Q12H    Vancomycin dosing per pharmacy   Other Rx Dosing/Monitoring    apixaban (ELIQUIS) tablet 5 mg  5 mg Oral Q12H    albuterol-ipratropium (DUO-NEB) 2.5 MG-0.5 MG/3 ML  3 mL Nebulization Q4H PRN    levothyroxine (SYNTHROID) tablet 50 mcg  50 mcg Oral 6am    carvediloL (COREG) tablet 6.25 mg  6.25 mg Oral BID WITH MEALS    atorvastatin (LIPITOR) tablet 40 mg  40 mg Oral DAILY    sodium chloride (NS) flush 5-10 mL  5-10 mL IntraVENous PRN    sodium chloride (NS) flush 5-40 mL  5-40 mL IntraVENous Q8H    sodium chloride (NS) flush 5-40 mL  5-40 mL IntraVENous PRN    acetaminophen (TYLENOL) tablet 650 mg  650 mg Oral Q6H PRN    Or    acetaminophen (TYLENOL) suppository 650 mg  650 mg Rectal Q6H PRN    ondansetron (ZOFRAN ODT) tablet 4 mg  4 mg Oral Q8H PRN    Or    ondansetron (ZOFRAN) injection 4 mg  4 mg IntraVENous Q6H PRN    bisacodyL (DULCOLAX) suppository 10 mg  10 mg Rectal DAILY PRN    doxycycline (VIBRAMYCIN) 100 mg in 0.9% sodium chloride (MBP/ADV) 100 mL MBP  100 mg IntraVENous Q12H    piperacillin-tazobactam (ZOSYN) 3.375 g in 0.9% sodium chloride (MBP/ADV) 100 mL MBP  3.375 g IntraVENous Q8H    insulin lispro (HUMALOG) injection   SubCUTAneous AC&HS    glucose chewable tablet 16 g  4 Tablet Oral PRN    glucagon (GLUCAGEN) injection 1 mg  1 mg IntraMUSCular PRN    dextrose 10 % infusion 0-250 mL  0-250 mL IntraVENous PRN     ______________________________________________________________________  EXPECTED LENGTH OF STAY: - - -  ACTUAL LENGTH OF STAY:          [address] [facility], DO

## 2022-04-12 NOTE — PROGRESS NOTES
.0800 Bedside shift change report given to  [address]  (oncoming nurse) by Francis Slater (offgoing nurse).  Report included the following information SBAR, Kardex, ED Summary, , Procedure Summary, Intake/Output, MAR, Recent Results, Med Rec Status, and Cardiac Rhythm   A-Fib

## 2022-04-12 NOTE — PROGRESS NOTES
Problem: Dysphagia (Adult)  Goal: *Acute Goals and Plan of Care (Insert Text)  Description: Speech Therapy Goals  Initiated 4/12/2022  1. Patient will tolerate ice chips without overt s/s aspiration or adverse response within 7 days. Outcome: Not Met     SPEECH LANGUAGE PATHOLOGY BEDSIDE SWALLOW EVALUATION  Patient: Walter Shane (55 y.o. male)  Date: 4/12/2022  Primary Diagnosis: Sepsis ([facility] [address] [A41.9]        Precautions: Aspiration       ASSESSMENT :  Based on the objective data described below, the patient presents with severe pharyngeal dysphagia since CVA. Patient reports CVA in July with PEG tube placement at that time as well. Patient demonstrated oral motor weakness with drooling of secretions noted on left. At bedside patient tolerated 2 ice chips without difficulty and free of s/s aspiration. After single small sip of water patient demonstrated prolonged coughing and then vomiting of clear liquids and saliva. No further PO trials given due to patient NPO except for occasional ice chips, sips of water prior to admit and vomiting. SLP to follow for re-assessment of swallow function once vomiting resolved given limited trials this date. Perhaps GI consult would be appropriate. Patient will benefit from skilled intervention to address the above impairments. Patients rehabilitation potential is considered to be Guarded     PLAN :  Recommendations and Planned Interventions:  Oral care  Occasional ice chips after oral care  Frequency/Duration: Patient will be followed by speech-language pathology 1-2x/week to address goals. Discharge Recommendations: To Be Determined     SUBJECTIVE:   Patient stated Max Saldana found I could swallow if I put my chin down.   of recent MBS completed at [facility]. Patient reports h/o [place] SLP however patient reports no longer working with [place] SLP because Mumtaz Paez was afraid of aspiration. \"    OBJECTIVE:     Past Medical History:   Diagnosis Date    Atrial fibrillation ([facility] [address]     Diabetes ([facility] [address]     Hypertension     [facility]     Stroke ([facility]     hemorrhagic      Past Surgical History:   Procedure Laterality Date    HX AMPUTATION Left     left 5 toes    HX HERNIA REPAIR       Prior Level of Function/Home Situation:      Diet prior to admission: Per patient NPO with PEG tube for nutrition/hydration/medication. Patient reports occasional ice chips and sips of water. Current Diet:  NPO   Cognitive and Communication Status:  Neurologic State: Alert  Orientation Level: Oriented X4  Cognition: Follows commands     Perseveration: No perseveration noted  Safety/Judgement: Awareness of environment  Oral Assessment:  Oral Assessment  Labial: Left droop  Dentition: Natural  Oral Hygiene: Coating on tongue  Lingual: Decreased rate  Velum: Unable to visualize  Mandible: No impairment  P.O. Trials:  Patient Position: Upright in bed  Vocal quality prior to P.O.: Low volume  Consistency Presented: Ice chips; Thin liquid  How Presented: SLP-fed/presented;Spoon;Straw     Bolus Acceptance: No impairment  Bolus Formation/Control: No impairment     Propulsion: No impairment  Oral Residue: None  Initiation of Swallow: Delayed (# of seconds)  Laryngeal Elevation: Decreased  Aspiration Signs/Symptoms: Material suctioned;Strong cough                   Pharyngeal Phase Severity : Severe    NOMS:   The NOMS functional outcome measure was used to quantify this patient's level of swallowing impairment. Based on the NOMS, the patient was determined to be at level 1 for swallow function       NOMS Swallowing Levels:  Level 1 (CN): NPO  Level 2 (CM): NPO but takes consistency in therapy  Level 3 (CL): Takes less than 50% of nutrition p.o. and continues with nonoral feedings; and/or safe with mod cues; and/or max diet restriction  Level 4 (CK):  Safe swallow but needs mod cues; and/or mod diet restriction; and/or still requires some nonoral feeding/supplements  Level 5 (CJ): Safe swallow with min diet restriction; and/or needs min cues  Level 6 (CI): Independent with p.o.; rare cues; usually self cues; may need to avoid some foods or needs extra time  Level 7 ([address] [place] [address]): Independent for all p.o.  SHERIE. (2003). National Outcomes Measurement System (NOMS): Adult Speech-Language Pathology User's Guide. After treatment:   Patient left in no apparent distress in bed, Call bell within reach, and Nursing notified    COMMUNICATION/EDUCATION:   Patient was educated regarding role of SLP and POC. Patient nodded. The patient's plan of care including recommendations, planned interventions, and recommended diet changes were discussed with: Registered nurse. Patient/family have participated as able in goal setting and plan of care. Patient/family agree to work toward stated goals and plan of care.     Thank you for this referral.  Jabari Zheng, SLP  Time Calculation: 20 mins

## 2022-04-12 NOTE — PROGRESS NOTES
Pharmacist Note - Vancomycin Dosing    Consult provided for this 77 y.o. male for indication of sepsis and probable pneumonia. Antibiotic regimen(s): Zosyn, Vancomycin, Doxycycline  Patient on vancomycin PTA? NO     Recent Labs     22  0420 22  191   WBC 7.0 8.6  --    CREA 0.98  --  1.16   BUN 34*  --  39*     Frequency of BMP: daily entered per protocol. Height: 180.3 cm (from > 1 year ago; however, patient should be very close to this currently. Current height requested)  Weight: 69.4 kg  Est CrCl: ~72 ml/min  Temp (24hrs), Av.1 °F (36.7 °C), Min:97.7 °F (36.5 °C), Max:98.5 °F (36.9 °C)    Cultures:   Blood:  pending    MRSA Swab ordered (if applicable)? NO. Entered per protocol by pharmacy. The plan below is expected to result in a target range of AUC/SERENA 400-600    Therapy will be initiated with a loading dose of 1500 mg IV x 1 to be followed by a maintenance dose of 750 mg IV every 12 hours. Pharmacy to follow patient daily and order levels / make dose adjustments as appropriate. *Vancomycin has been dosed used Bayesian kinetics software to target an AUC/SERENA of 400-600, which provides adequate exposure for an assumed infection due to MRSA with an SERENA of 1 or less while reducing the risk of nephrotoxicity as seen with traditional trough based dosing goals.

## 2022-04-13 LAB
ANION GAP SERPL CALC-SCNC: 6 MMOL/L (ref 5–15)
BACTERIA SPEC CULT: ABNORMAL
BUN SERPL-MCNC: 31 MG/DL (ref 6–20)
BUN/CREAT SERPL: 29 (ref 12–20)
CALCIUM SERPL-MCNC: 8.5 MG/DL (ref 8.5–10.1)
CHLORIDE SERPL-SCNC: 108 MMOL/L (ref 97–108)
CO2 SERPL-SCNC: 23 MMOL/L (ref 21–32)
CREAT SERPL-MCNC: 1.06 MG/DL (ref 0.7–1.3)
ERYTHROCYTE [DISTWIDTH] IN BLOOD BY AUTOMATED COUNT: 14.6 % (ref 11.5–14.5)
GLUCOSE BLD STRIP.AUTO-MCNC: 194 MG/DL (ref 65–117)
GLUCOSE BLD STRIP.AUTO-MCNC: 226 MG/DL (ref 65–117)
GLUCOSE BLD STRIP.AUTO-MCNC: 236 MG/DL (ref 65–117)
GLUCOSE SERPL-MCNC: 190 MG/DL (ref 65–100)
HCT VFR BLD AUTO: 27.9 % (ref 36.6–50.3)
HGB BLD-MCNC: 9.2 G/DL (ref 12.1–17)
MAGNESIUM SERPL-MCNC: 2.1 MG/DL (ref 1.6–2.4)
MCH RBC QN AUTO: 28.4 PG (ref 26–34)
MCHC RBC AUTO-ENTMCNC: 33 G/DL (ref 30–36.5)
MCV RBC AUTO: 86.1 FL (ref 80–99)
NRBC # BLD: 0 K/UL (ref 0–0.01)
NRBC BLD-RTO: 0 PER 100 WBC
PHOSPHATE SERPL-MCNC: 2.1 MG/DL (ref 2.6–4.7)
PLATELET # BLD AUTO: 164 K/UL (ref 150–400)
PMV BLD AUTO: 11.9 FL (ref 8.9–12.9)
POTASSIUM SERPL-SCNC: 3.9 MMOL/L (ref 3.5–5.1)
RBC # BLD AUTO: 3.24 M/UL (ref 4.1–5.7)
SERVICE CMNT-IMP: ABNORMAL
SODIUM SERPL-SCNC: 137 MMOL/L (ref 136–145)
WBC # BLD AUTO: 5.9 K/UL (ref 4.1–11.1)

## 2022-04-13 PROCEDURE — 82962 GLUCOSE BLOOD TEST: CPT

## 2022-04-13 PROCEDURE — 83735 ASSAY OF MAGNESIUM: CPT

## 2022-04-13 PROCEDURE — 74011636637 HC RX REV CODE- 636/637: Performed by: INTERNAL MEDICINE

## 2022-04-13 PROCEDURE — 74011250637 HC RX REV CODE- 250/637: Performed by: INTERNAL MEDICINE

## 2022-04-13 PROCEDURE — 74011250636 HC RX REV CODE- 250/636: Performed by: INTERNAL MEDICINE

## 2022-04-13 PROCEDURE — 36415 COLL VENOUS BLD VENIPUNCTURE: CPT

## 2022-04-13 PROCEDURE — 99231 SBSQ HOSP IP/OBS SF/LOW 25: CPT | Performed by: CLINICAL NURSE SPECIALIST

## 2022-04-13 PROCEDURE — 84100 ASSAY OF PHOSPHORUS: CPT

## 2022-04-13 PROCEDURE — 74011250637 HC RX REV CODE- 250/637: Performed by: NURSE PRACTITIONER

## 2022-04-13 PROCEDURE — 74011000258 HC RX REV CODE- 258: Performed by: INTERNAL MEDICINE

## 2022-04-13 PROCEDURE — 74011000250 HC RX REV CODE- 250: Performed by: INTERNAL MEDICINE

## 2022-04-13 PROCEDURE — 85027 COMPLETE CBC AUTOMATED: CPT

## 2022-04-13 PROCEDURE — 65270000029 HC RM PRIVATE

## 2022-04-13 PROCEDURE — 80048 BASIC METABOLIC PNL TOTAL CA: CPT

## 2022-04-13 RX ORDER — HYDROCODONE BITARTRATE AND ACETAMINOPHEN 5; 325 MG/1; MG/1
1 TABLET ORAL
Status: DISCONTINUED | OUTPATIENT
Start: 2022-04-13 | End: 2022-04-17 | Stop reason: HOSPADM

## 2022-04-13 RX ORDER — INSULIN GLARGINE 100 [IU]/ML
12 INJECTION, SOLUTION SUBCUTANEOUS
Status: DISCONTINUED | OUTPATIENT
Start: 2022-04-13 | End: 2022-04-14

## 2022-04-13 RX ADMIN — CARVEDILOL 25 MG: 12.5 TABLET, FILM COATED ORAL at 08:00

## 2022-04-13 RX ADMIN — DOXYCYCLINE 100 MG: 100 INJECTION, POWDER, LYOPHILIZED, FOR SOLUTION INTRAVENOUS at 00:07

## 2022-04-13 RX ADMIN — SODIUM CHLORIDE, PRESERVATIVE FREE 10 ML: 5 INJECTION INTRAVENOUS at 06:01

## 2022-04-13 RX ADMIN — PIPERACILLIN SODIUM AND TAZOBACTAM SODIUM 3.38 G: 3; 375 INJECTION, POWDER, FOR SOLUTION INTRAVENOUS at 22:19

## 2022-04-13 RX ADMIN — VANCOMYCIN HYDROCHLORIDE 750 MG: 750 INJECTION, POWDER, LYOPHILIZED, FOR SOLUTION INTRAVENOUS at 03:32

## 2022-04-13 RX ADMIN — HYDROCODONE BITARTRATE AND ACETAMINOPHEN 1 TABLET: 5; 325 TABLET ORAL at 14:59

## 2022-04-13 RX ADMIN — CARVEDILOL 25 MG: 12.5 TABLET, FILM COATED ORAL at 16:06

## 2022-04-13 RX ADMIN — ATORVASTATIN CALCIUM 40 MG: 40 TABLET, FILM COATED ORAL at 12:34

## 2022-04-13 RX ADMIN — PIPERACILLIN SODIUM AND TAZOBACTAM SODIUM 3.38 G: 3; 375 INJECTION, POWDER, FOR SOLUTION INTRAVENOUS at 04:23

## 2022-04-13 RX ADMIN — ONDANSETRON 4 MG: 2 INJECTION INTRAMUSCULAR; INTRAVENOUS at 18:49

## 2022-04-13 RX ADMIN — APIXABAN 5 MG: 5 TABLET, FILM COATED ORAL at 12:34

## 2022-04-13 RX ADMIN — SODIUM CHLORIDE, PRESERVATIVE FREE 10 ML: 5 INJECTION INTRAVENOUS at 22:00

## 2022-04-13 RX ADMIN — INSULIN GLARGINE 12 UNITS: 100 INJECTION, SOLUTION SUBCUTANEOUS at 18:45

## 2022-04-13 RX ADMIN — ACETAMINOPHEN 650 MG: 650 SUPPOSITORY RECTAL at 03:32

## 2022-04-13 RX ADMIN — LEVOTHYROXINE SODIUM 50 MCG: 0.05 TABLET ORAL at 06:01

## 2022-04-13 RX ADMIN — PIPERACILLIN SODIUM AND TAZOBACTAM SODIUM 3.38 G: 3; 375 INJECTION, POWDER, FOR SOLUTION INTRAVENOUS at 12:44

## 2022-04-13 RX ADMIN — VANCOMYCIN HYDROCHLORIDE 750 MG: 750 INJECTION, POWDER, LYOPHILIZED, FOR SOLUTION INTRAVENOUS at 16:06

## 2022-04-13 NOTE — PROGRESS NOTES
I prated with Omayra Nelson and celebrated with him the anointing of the Sick.   Father Aleida Grimes

## 2022-04-13 NOTE — CONSULTS
Comprehensive Nutrition Assessment    Type and Reason for Visit: Consult,Reassess    Nutrition Recommendations/Plan:      Modify TF regimen:  -Change to no-fiber formula (Osmolite 1.2)  -Give 355 mL Osmolite 1.2, 5x/day with 100 mL h2o flush after each feeding  -Feeding times of ~8:30 AM, 11:30 AM, 2:30 PM, 5:30 PM, and 8:30 PM  -Do not give TF within 1 hour before/after Synthroid administration (currently scheduled at 6 AM, so should not be issue). *overall, new regimen provides 56 gm/CHO per bolus vs previously 61 gm CHO/bolus (total 280 gm CHO/day vs previously 307 gm CHO/day)*    Consider pro-motility agent (hx of gastroparesis)  Consider GI consult    If vomiting persists, consider conversion to G/J tube for post-gastric feeds    Monitor Phos - low today. Consider repletion. Defer to MD.  Not r/t refeeding. Nutrition Assessment:     PMHx includes type 1 diabetes; chronic atrial fibrillation, on Eliquis for anticoagulation; status post CVA with left-sided weakness; hypothyroidism; dyslipidemia; hypertension; and dysphagia secondary to CVA, status post PEG tube placement, who was in his usual state of health until the day of his presentation at the emergency room when the patient developed vomiting. According to report, the patient woke up in the middle of the night and found to have low blood sugar. His spouse gave him some orange juice and the patient started vomiting. The vomiting was nonbilious. The patient had multiple episodes of vomiting. The following morning, the patient's temperature was reported as 99. He was taken to [facility] for further evaluation. When the patient arrived at this hospital, CT scan of the chest, abdomen and pelvis was performed. The CT of the chest shows evidence of pneumonia. There was a concern for aspiration pneumonia.   The patient was going to be admitted to [facility] for further evaluation and treatment, but the patient left the hospital against medical advice because the wait was too long for him. He then came to [facility] emergency room for re-evaluation. When the patient arrived at the emergency room, the patient has tachycardia, tachypnea. There was a concern for sepsis. 4/12: RD consulted for tube feeding management. Met with pt in room. Unsure of formula/ regimen, but able to state he takes nothing by mouth. Called wife Huseyin Boone who states she plans to bring printed regimen of medications, feeding schedule, etc shortly as he is a type 1 diabetic and works closely with endocrinologist.  From what I gathered, pt does a total of 6 cartons of Jevity 1.5 daily over 5 feedings which equates to ~284 mL Jevity 1.5, 5 x/day and this provides 61 gm CHO at [facility] feeding. Pt also gets 300 mL h2o with each feed. Total daily regimen provides 1422 mL, 2133 kcal, 91 gm pro, 307 gm CHO, and 1081+1158=9896 mL free H2o. Wife states he gets 12 units of Lantus at night and then she doses his mealtime Humalog based on his BG at that time, which usually is about 3-4 units. Pt wears a continuous glucose monitor. Feeding regimen and insulin discussed with diabetes RN. Diabetes RN also reports pt with hx of gastroparesis which likely further complicates his BG management. Wife reports BW at time of stroke in July 2021 was 149 lb. Wife reports pt started losing weight with initial TF regimen of only 4 cartons daily, so TF regimen was increased to 6 cartons daily. Current BW of 153 lb indicating wt gain/ adequate TF. Of note, wife brought in feeding and medication schedule that she follows closely. She gives pt's Synthroid at 7:30 AM and then first TF bolus at 8:30 AM.      4/13: reconsulted d/t ongoing issues with vomiting. Discussed hx of GP with MD.  Perhaps promotility agent would help. If vomiting persists, agree may benefit with conversion to G/J tube. Presently, pt on high fiber formula.   So could also try a lower fiber formula, but would require more volume d/t it being lower kcal.  Want to try and achieve same CHO intake d/t current insulin dosing. MD OK with trying lower fiber formula even though will require more volume. Will start tonight. Recommend Osmolite 1.2 - 355 mL with 100 mL h2o after bolus for total of 5 feeds/day (same as before). This provides 426 kcal and 56 gm CHO with each feed. Total 1775 mL, 2130 kcal, 99 gm pro, 280 gm CHO, and 1438+776=6576 mL free H2o. Likely needs more water - will adjust later to try and keep total volume lower with each feed until tolerance determined. Phos slightly low today. Not refeeding. Consider repletion vs monitoring. Malnutrition Assessment:  Malnutrition Status: At risk for malnutrition (specify) (PEG)    Context:  Chronic illness         Nutritionally Significant Medications:   Lipitor, lantus 12 units q HS, SSI, synthroid, zosyn, vanc  PRN: norco, zofran    Estimated Daily Nutrient Needs:   Energy (kcal): 2100 (30 kcal/kg)  Wt used: Current (69.4 kg)  Protein (gm):  (1.2-1.5 gm/kg)    Fluid (mL/day): 1 mL/kcal       Nutrition Related Findings:   Edema:  Generalized: Non-pitting (4/13/2022  8:17 AM)      Last BM: 04/13/22, Formed        Wounds:    None       Current Nutrition Therapies:  Diet: NPO  EN Order: see above    Anthropometric Measures:  · Height:  5' 11\" (180.3 cm)  · Current Body Wt:  69.4 kg (153 lb)   · Ideal Body Wt:  172 lbs:  89 %   · BMI Category:  Normal weight (BMI 18.5-24. 9)       Wt Readings from Last 20 Encounters:   04/12/22 69.4 kg (153 lb)   12/27/10 86.2 kg (190 lb)       Nutrition Diagnosis:   · Inadequate oral intake related to cognitive or neurological impairment,swallowing difficulty as evidenced by nutrition support-enteral nutrition      Nutrition Interventions:   Food and/or Nutrient Delivery: Continue NPO,Modify tube feeding  Nutrition Education and Counseling: No recommendations at this time  Coordination of Nutrition Care: Continue to monitor while inpatient,Other (specify) (MD)    Goals:  tolerate TF to meet EEN over next 7 days       Nutrition Monitoring and Evaluation:   Behavioral-Environmental Outcomes: None identified  Food/Nutrient Intake Outcomes: Enteral nutrition intake/tolerance  Physical Signs/Symptoms Outcomes: Biochemical data,Nutrition focused physical findings,Weight,GI status    Discharge Planning:    Enteral nutrition     Recent Labs     04/13/22  0025 04/12/22 0420 04/11/22 1915   * 153* 191*   BUN 31* 34* 39*   CREA 1.06 0.98 1.16    136 133*   K 3.9 3.7 4.0    104 101   CO2 23 22 26   CA 8.5 8.8 9.4   PHOS 2.1* 2.6  --    MG 2.1 2.2  --        Recent Labs     04/12/22  0420 04/11/22 1915   ALT 43 51   * 156*   TBILI 0.4 0.4   TP 6.9 7.8   ALB 2.8* 3.6   GLOB 4.1* 4.2*   LPSE 90  --        Recent Labs     04/11/22 2038   LAC 1.0       Recent Labs     04/13/22  0025 04/12/22 0420   WBC 5.9 7.0   HGB 9.2* 9.8*   HCT 27.9* 29.9*    181       No results found for: PREALB    No results found for: TRIGL    Recent Labs     04/13/22  1626 04/13/22  1105 04/13/22  0700 04/12/22  1657 04/12/22  1215 04/12/22  0835 04/12/22  0639 04/11/22  2206   GLUCPOC 194* 226* 236* 237* 120* 136* 138* 170*       Lab Results   Component Value Date/Time    Hemoglobin A1c 7.9 (H) 04/12/2022 04:26 AM       Lani Lopez RD  Available via [employer]

## 2022-04-13 NOTE — PROGRESS NOTES
[facility] [facility]  Hospitalist Group                                                                                          Hospitalist Progress Note  Keeley Kurtz   Answering service: [phone] from in house phone        Date of Service:  2022  NAME:  Bassam Lau  :  1955  MRN:  [id number]      Admission Summary:   Per H&P:  \"This is a 71-year-old man with past medical history significant for type 2 diabetes; chronic atrial fibrillation, on Eliquis for anticoagulation; status post CVA with left-sided weakness; hypothyroidism; dyslipidemia; hypertension; and dysphagia secondary to CVA, status post PEG tube placement, who was in his usual state of health until the day of his presentation at the emergency room when the patient developed vomiting. According to report, the patient woke up in the middle of the night and found to have low blood sugar. His spouse gave him some orange juice and the patient started vomiting. The vomiting was nonbilious. The patient had multiple episodes of vomiting. The following morning, the patient's temperature was reported as 99. He was taken to [facility] for further evaluation. When the patient arrived at this hospital, CT scan of the chest, abdomen and pelvis was performed. The CT of the chest shows evidence of pneumonia. There was a concern for aspiration pneumonia. The patient was going to be admitted to [facility] for further evaluation and treatment, but the patient left the hospital against medical advice because the wait was too long for him. He then came to [facility] emergency room for re-evaluation. When the patient arrived at the emergency room, the patient has tachycardia, tachypnea. There was a concern for sepsis. The sepsis protocol was initiated.   The patient received fluid therapy, broad-spectrum antibiotics, and was referred to the hospitalist service for evaluation for admission. No record of prior admission to this hospital.\"          Interval history / Subjective: Follow up nausea/vomiting. Patient seen and examined. Sister at bedside. Nursing report vomiting with tube feeds. Per report, wife says patient has recurrent vomiting on tube feeds at home. Assessment & Plan:     Sepsis (elevated HR, RR) secondary to Aspiration pneumonia, POA:   -vomiting episode 4/11. CT chest with left perihilar and lower lung airspace disease  -could be be pneumonitis  -continue empiric antibiotics  -MRSA nasal swab positive  -speech consult  -nutrition consult    Dysphagia, s/p PEG tube  -nutrition reconsult  -consider GI consult pending clinical course, ?need G/J tube  -PEG tube in good position on CT ab/pelvis outside hospital     Type I diabetes:   -DM consultation. lantus 12 units, SSI    Atrial fibrillation:   -continue home eliquis, carvedilol    History CVA, cardioembolic   History ICH  -has both right and left sided deficits, aphasia  -continue eliquis    Hypothyroidism: home levothyroxine  HLD: home statin  HTN: BB       Code status: full   Prophylaxis: eliquis  Care Plan discussed with: patient, wife, nurse  Anticipated Disposition: 1-2 days     Hospital Problems  Date Reviewed: 4/11/2022          Codes Class Noted POA    * (Principal) Sepsis ([facility] [address] ICD-10-CM: A41.9  ICD-9-CM: 038.9, 995.91  4/11/2022 Yes                Review of Systems:   Negative unless stated above    Vital Signs:    Last 24hrs VS reviewed since prior progress note.  Most recent are:  Visit Vitals  BP (!) 158/78 (BP 1 Location: Right upper arm, BP Patient Position: At rest)   Pulse 90   Temp 98.3 °F (36.8 °C)   Resp 18   Ht 5' 11\" (1.803 m)   Wt 72.9 kg (160 lb 11.5 oz)   SpO2 94%   BMI 22.42 kg/m²         Intake/Output Summary (Last 24 hours) at 4/13/2022 1509  Last data filed at 4/13/2022 0559  Gross per 24 hour   Intake 240 ml   Output 1201 ml   Net -961 ml        Physical Examination:     I had a face to face encounter with this patient and independently examined them on 4/13/2022 as outlined below:          Constitutional:  No acute distress, cooperative, pleasant    ENT:  +secretions   Resp:  Coarse bilaterally. No accessory muscle use. CV:  irregularly irregular, no murmurs, gallops, rubs    GI:  Soft, non distended, non tender. PEG tube in place, normoactive bowel sounds, no hepatosplenomegaly     Musculoskeletal:  No edema, warm, 2+ pulses throughout    Neurologic:  left sided hemiparesis, alert, asphasia             Data Review:    Review and/or order of clinical lab test  Review and/or order of tests in the radiology section of CPT  Review and/or order of tests in the medicine section of CPT      Labs:     Recent Labs     04/13/22 0025 04/12/22 0420   WBC 5.9 7.0   HGB 9.2* 9.8*   HCT 27.9* 29.9*    181     Recent Labs     04/13/22 0025 04/12/22 0420 04/11/22 1915    136 133*   K 3.9 3.7 4.0    104 101   CO2 23 22 26   BUN 31* 34* 39*   CREA 1.06 0.98 1.16   * 153* 191*   CA 8.5 8.8 9.4   MG 2.1 2.2  --    PHOS 2.1* 2.6  --      Recent Labs     04/12/22 0420 04/11/22 1915   ALT 43 51   * 156*   TBILI 0.4 0.4   TP 6.9 7.8   ALB 2.8* 3.6   GLOB 4.1* 4.2*   LPSE 90  --      No results for input(s): INR, PTP, APTT, INREXT, INREXT in the last 72 hours. No results for input(s): FE, TIBC, PSAT, FERR in the last 72 hours. No results found for: FOL, RBCF   No results for input(s): PH, PCO2, PO2 in the last 72 hours. No results for input(s): CPK, CKNDX, TROIQ in the last 72 hours.     No lab exists for component: CPKMB  No results found for: CHOL, CHOLX, CHLST, CHOLV, HDL, HDLP, LDL, LDLC, DLDLP, TGLX, TRIGL, TRIGP, CHHD, CHHDX  Lab Results   Component Value Date/Time    Glucose (POC) 226 (H) 04/13/2022 11:05 AM    Glucose (POC) 236 (H) 04/13/2022 07:00 AM    Glucose (POC) 237 (H) 04/12/2022 04:57 PM    Glucose (POC) 120 (H) 04/12/2022 12:15 PM    Glucose (POC) 136 (H) 04/12/2022 08:35 AM     No results found for: COLOR, APPRN, SPGRU, REFSG, MYLES, PROTU, GLUCU, KETU, BILU, UROU, CLAIRE, LEUKU, GLUKE, EPSU, BACTU, WBCU, RBCU, CASTS, UCRY      Medications Reviewed:     Current Facility-Administered Medications   Medication Dose Route Frequency    HYDROcodone-acetaminophen (NORCO) 5-325 mg per tablet 1 Tablet  1 Tablet Oral Q6H PRN    insulin glargine (LANTUS) injection 12 Units  12 Units SubCUTAneous QHS    hydrALAZINE (APRESOLINE) 20 mg/mL injection 10 mg  10 mg IntraVENous Q6H PRN    vancomycin (VANCOCIN) 750 mg in  mL infusion  750 mg IntraVENous Q12H    Vancomycin dosing per pharmacy   Other Rx Dosing/Monitoring    apixaban (ELIQUIS) tablet 5 mg  5 mg Oral Q12H    albuterol-ipratropium (DUO-NEB) 2.5 MG-0.5 MG/3 ML  3 mL Nebulization Q4H PRN    levothyroxine (SYNTHROID) tablet 50 mcg  50 mcg Oral 6am    atorvastatin (LIPITOR) tablet 40 mg  40 mg Oral DAILY    carvediloL (COREG) tablet 25 mg  25 mg Oral BID WITH MEALS    dextrose 10 % infusion 0-250 mL  0-250 mL IntraVENous PRN    sodium chloride (NS) flush 5-10 mL  5-10 mL IntraVENous PRN    sodium chloride (NS) flush 5-40 mL  5-40 mL IntraVENous Q8H    sodium chloride (NS) flush 5-40 mL  5-40 mL IntraVENous PRN    acetaminophen (TYLENOL) tablet 650 mg  650 mg Oral Q6H PRN    Or    acetaminophen (TYLENOL) suppository 650 mg  650 mg Rectal Q6H PRN    ondansetron (ZOFRAN ODT) tablet 4 mg  4 mg Oral Q8H PRN    Or    ondansetron (ZOFRAN) injection 4 mg  4 mg IntraVENous Q6H PRN    bisacodyL (DULCOLAX) suppository 10 mg  10 mg Rectal DAILY PRN    piperacillin-tazobactam (ZOSYN) 3.375 g in 0.9% sodium chloride (MBP/ADV) 100 mL MBP  3.375 g IntraVENous Q8H    insulin lispro (HUMALOG) injection   SubCUTAneous AC&HS    glucose chewable tablet 16 g  4 Tablet Oral PRN    glucagon (GLUCAGEN) injection 1 mg  1 mg IntraMUSCular PRN     ______________________________________________________________________  EXPECTED LENGTH OF STAY: 4d 0h  ACTUAL LENGTH OF STAY:          [address] Carter, DO

## 2022-04-13 NOTE — PROGRESS NOTES
TRANSITION OF CARE  RUR--13%  Disposition-- Resume home health SN, PT,OT services with [facility]. Will need JADE order prior to discharge. Consult: Speech  Transport--Family    Patient's primary family contact: spouse Sedrick Godwin    JOSE EDUARDO reviewed case with treatment team during [address]. Patient now vomiting tube feeds. Speech now deferred due to vomiting.

## 2022-04-13 NOTE — DIABETES MGMT
[facility]    CLINICAL NURSE SPECIALIST CONSULT     Initial Presentation   Praveen Sinha is a 77 y.o. male who presented to the ED 4/11/22 with recurrent vomiting, low grade temperature and low blood sugar (70). He was seen at an OSH ED for these symptoms and CT there was concerning for aspiration pneumonia. He left the OSH ED AMA s/t long waiting time and presented for evaluation at [facility] ED. In the ED, he was tachycardic, tapchypnic and sepsis protocol initiated. The patient received fluid therapy, broad-spectrum antibiotics, and was referred to the hospitalist service for evaluation for admission    HX:   Past Medical History:   Diagnosis Date    Atrial fibrillation ([facility] [address]     Diabetes ([address]     Hypertension     Rheumatoid arthritis ([facility] [address]     Stroke ([facility] [address]     hemorrhagic         INITIAL DX:   Sepsis ([facility] [address] [A41.9]     Current Treatment     TX: IV antibiotics    Consulted by Uzma Stout MD for advanced diabetes nursing assessment and care for:   [x] Inpatient management strategy  Hospital Course   Clinical progress has been uncomplicated. 4/11: Admission. CT with left perihilar and lower lung airspace disease. CXR negative for acute process  Diabetes History   Type 1 Diabetes- Diagnosed 45 years ago  Ambulatory BG management provided by endocrinologist, Tyler David MD  Current A1C 7.8%    Diabetes-related Medical History  Neurological complications  Peripheral neuropathy  Macrovascular disease  Cerebral vascular accident  Other associated conditions     PEG, hypothyroidism     Diabetes Medication History  Lantus: 12 units daily  Humalog: Sliding scale    Diabetes self-management practices:   Eating pattern  Jevity 1.5, 5 bolus feeds/day  Physical activity pattern  Wheelchair bound   Monitoring pattern  Wearing Dexcom CGM.   BG in the am usually around 113, daytime -230s, can get as high as 300s  Unusual for his BG to dip below 80s- has an alarm that sounds on dexcom when his BG dips below 100  Taking medications pattern  [x] Consistent administration   [x] Affordable    Social determinants of health impacting diabetes self-management practices   Concerned that you need to know more about how to stay healthy with diabetes    Overall evaluation:    [x] Achieving individualized A1c target with drug therapy & self-care practices    Subjective   I vomited last night.      Hx CVA, now wheelchair bound with PEG  , lives with his wife who assists with total care    Objective   Physical exam  General Normal weight male in no acute distress, chronically ill appearing. Weak, chronic slurred speech- baseline but able to converse and follow simple commands  Neuro  Alert, oriented   Vital Signs   Visit Vitals  /76 (BP 1 Location: Right upper arm, BP Patient Position: At rest)   Pulse 88   Temp 98.1 °F (36.7 °C)   Resp 18   Ht 5' 11\" (1.803 m)   Wt 72.9 kg (160 lb 11.5 oz)   SpO2 93%   BMI 22.42 kg/m²     Skin  Warm and dry. No acanthosis noted along neckline. No lipohypertrophy or lipoatrophy noted at injection sites   Heart   Regular rate and rhythm.  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        Laboratory  Recent Labs     22  0025 22  0420 22  1944 22  1915   * 153*  --  191*   AGAP 6 10  --  6   WBC 5.9 7.0 8.6  --    CREA 1.06 0.98  --  1.16   GFRNA >60 >60  --  >60   AST  --  24  --  26   ALT  --  43  --  51       Factors impacting BG management  Factor Dose Comments   Nutrition:  Tube feeding via PEG     284 mL of Jevity 1.5, 5 times/day     Infection Suspicion for aspiration PNA  Blood Cx NGTD  IV antibiotics       Blood glucose pattern      Significant diabetes-related events over the past 24-72 hours  Fasting B  Daytime B-237, 190 overnight  A1C 7.9%  Blood cx NGTD  Lantus: 12 units daily  Correction: 3 units in the last 24h  Some emesis overnight when feeds resumed  Wife mentioned that protonix was stopped at OSH 1 month ago and concerned with PEG position \"it is out more than usual\"    Assessment and Plan   Nursing Diagnosis Risk for unstable blood glucose pattern   Nursing Intervention Domain 7971 Decision-making Support   Nursing Interventions Examined current inpatient diabetes/blood glucose control   Explored factors facilitating and impeding inpatient management  Explored corrective strategies with patient and responsible inpatient provider   Informed patient of rational for insulin strategy while hospitalized     Evaluation   Ilaina Pardo \"Santillan\" Phil Prieto is a 77year old gentleman, with Type 1 Diabetes with complications including foot wounds s/p L TMA amputation, gastroparesis and hemorrhagic CVA now wheelchair bound, admitted with aspiration pneumonia. A1C on admission is 7.9%, which is likely goal given his baseline debility. BG on admission was 191 and BG was 120-170 prior to enteral feeds resuming. Baseline low dose glargine was resumed and glucose teodoro to low 200s following enteral feeds resuming. Please continue current inpatient insulin therapy. Inpatient glucose goal is closer to 180 given decreased mobility and ability to communicate. Recommendations   1. POC glucose ACHS and overnight. [id number] for him to continue wearing his dexcom CGM but all insulin doses will be made off of hospital POC values. 2. Feeds per dietician     3. Continue the subcutaneous Insulin Order set (1445):    Insulin Dosing Specific recommendation   Basal                                      (Based on weight, BMI & GFR) 12 units Lantus at bed    Corrective                                       (Useful in adjusting insulin dosing) [x] Normal sensitivity: ACHS      If vomiting continues, consider GI referral   Billing Code(s)   [x] 96409  Before making these care recommendations, I personally reviewed the hospitalization record, including notes, laboratory & diagnostic data and current medications, and examined the patient at the bedside (circumstances permitting) before making care recommendations. More than fifty (50) percent of the time was spent in patient counseling and/or care coordination.   Total minutes: 13      ALO Munguia  Diabetes Clinical Nurse Specialist  [facility]  Access via [employer]

## 2022-04-13 NOTE — PROGRESS NOTES
SLP Contact Note    Pt continues with significant vomiting. Will hold SLP for now.       Thank you,  Syed Trevizo, KHUSHIEd, [address] [facility]  Speech-Language Pathologist

## 2022-04-13 NOTE — PROGRESS NOTES
Physician Progress Note      PATIENT:               Johny Marroquin  CSN #:                  [id number]  :                       1955  ADMIT DATE:       2022 7:24 PM  [address] [place] DATE:  RESPONDING  PROVIDER #:        CEE MEDINA DO          QUERY TEXT:    Documentation reflects Sepsis in the H&P but not in the subsequent Progress Note. , RR 40 on admission, with WBC 8.6, Lactic Acid and Procalcitonin WNL. If possible, please document in the progress notes and discharge summary if Sepsis was: The medical record reflects the following:  Risk Factors: admitted with PNA  Clinical Indicators: , RR 40 on admission, WBC 8.6, Lactic  Acid 1.0, and Procal 0.09. Pt found to have PNA, with Sepsis documented in the H&P but not in subsequent documentation. Treatment: 1L NS IV Bolus, followed by IVF at 125 cc/ hr, Zosyn/ Vanc/ Doxycycline IV. Thank you,  Javan Brewster RN  Clinical Documentation  [phone]  Options provided:  -- Sepsis confirmed after study  -- Sepsis ruled out after study  -- Other - I will add my own diagnosis  -- Disagree - Not applicable / Not valid  -- Disagree - Clinically unable to determine / Unknown  -- Refer to Clinical Documentation Reviewer    PROVIDER RESPONSE TEXT:    Sepsis confirmed after study.     Query created by: Jeremiah Cervantes on 2022 1:58 PM      Electronically signed by:  Elio Saldana DO 2022 3:09 PM

## 2022-04-14 LAB
ANION GAP SERPL CALC-SCNC: 8 MMOL/L (ref 5–15)
BUN SERPL-MCNC: 29 MG/DL (ref 6–20)
BUN/CREAT SERPL: 27 (ref 12–20)
CALCIUM SERPL-MCNC: 9 MG/DL (ref 8.5–10.1)
CHLORIDE SERPL-SCNC: 111 MMOL/L (ref 97–108)
CO2 SERPL-SCNC: 20 MMOL/L (ref 21–32)
CREAT SERPL-MCNC: 1.06 MG/DL (ref 0.7–1.3)
ERYTHROCYTE [DISTWIDTH] IN BLOOD BY AUTOMATED COUNT: 14.8 % (ref 11.5–14.5)
GLUCOSE BLD STRIP.AUTO-MCNC: 120 MG/DL (ref 65–117)
GLUCOSE BLD STRIP.AUTO-MCNC: 251 MG/DL (ref 65–117)
GLUCOSE BLD STRIP.AUTO-MCNC: 259 MG/DL (ref 65–117)
GLUCOSE BLD STRIP.AUTO-MCNC: 331 MG/DL (ref 65–117)
GLUCOSE SERPL-MCNC: 165 MG/DL (ref 65–100)
HCT VFR BLD AUTO: 28.9 % (ref 36.6–50.3)
HGB BLD-MCNC: 9.4 G/DL (ref 12.1–17)
MAGNESIUM SERPL-MCNC: 1.9 MG/DL (ref 1.6–2.4)
MCH RBC QN AUTO: 28.8 PG (ref 26–34)
MCHC RBC AUTO-ENTMCNC: 32.5 G/DL (ref 30–36.5)
MCV RBC AUTO: 88.7 FL (ref 80–99)
NRBC # BLD: 0 K/UL (ref 0–0.01)
NRBC BLD-RTO: 0 PER 100 WBC
PHOSPHATE SERPL-MCNC: 2.3 MG/DL (ref 2.6–4.7)
PLATELET # BLD AUTO: 176 K/UL (ref 150–400)
PMV BLD AUTO: 12 FL (ref 8.9–12.9)
POTASSIUM SERPL-SCNC: 3.9 MMOL/L (ref 3.5–5.1)
RBC # BLD AUTO: 3.26 M/UL (ref 4.1–5.7)
SERVICE CMNT-IMP: ABNORMAL
SODIUM SERPL-SCNC: 139 MMOL/L (ref 136–145)
VANCOMYCIN SERPL-MCNC: 17.1 UG/ML
WBC # BLD AUTO: 7.7 K/UL (ref 4.1–11.1)

## 2022-04-14 PROCEDURE — 65270000029 HC RM PRIVATE

## 2022-04-14 PROCEDURE — 77030018798 HC PMP KT ENTRL FED COVD -A

## 2022-04-14 PROCEDURE — 92526 ORAL FUNCTION THERAPY: CPT | Performed by: SPEECH-LANGUAGE PATHOLOGIST

## 2022-04-14 PROCEDURE — 99231 SBSQ HOSP IP/OBS SF/LOW 25: CPT | Performed by: CLINICAL NURSE SPECIALIST

## 2022-04-14 PROCEDURE — 83735 ASSAY OF MAGNESIUM: CPT

## 2022-04-14 PROCEDURE — 74011000250 HC RX REV CODE- 250: Performed by: INTERNAL MEDICINE

## 2022-04-14 PROCEDURE — 36415 COLL VENOUS BLD VENIPUNCTURE: CPT

## 2022-04-14 PROCEDURE — 74011250637 HC RX REV CODE- 250/637: Performed by: INTERNAL MEDICINE

## 2022-04-14 PROCEDURE — 82962 GLUCOSE BLOOD TEST: CPT

## 2022-04-14 PROCEDURE — 80202 ASSAY OF VANCOMYCIN: CPT

## 2022-04-14 PROCEDURE — 80048 BASIC METABOLIC PNL TOTAL CA: CPT

## 2022-04-14 PROCEDURE — 74011636637 HC RX REV CODE- 636/637: Performed by: HOSPITALIST

## 2022-04-14 PROCEDURE — 74011250636 HC RX REV CODE- 250/636: Performed by: INTERNAL MEDICINE

## 2022-04-14 PROCEDURE — 74011636637 HC RX REV CODE- 636/637: Performed by: INTERNAL MEDICINE

## 2022-04-14 PROCEDURE — 85027 COMPLETE CBC AUTOMATED: CPT

## 2022-04-14 PROCEDURE — 74011000258 HC RX REV CODE- 258: Performed by: INTERNAL MEDICINE

## 2022-04-14 PROCEDURE — 94760 N-INVAS EAR/PLS OXIMETRY 1: CPT

## 2022-04-14 PROCEDURE — 84100 ASSAY OF PHOSPHORUS: CPT

## 2022-04-14 RX ORDER — VANCOMYCIN/0.9 % SOD CHLORIDE 1 G/100 ML
1000 PLASTIC BAG, INJECTION (ML) INTRAVENOUS
Status: DISCONTINUED | OUTPATIENT
Start: 2022-04-15 | End: 2022-04-17 | Stop reason: HOSPADM

## 2022-04-14 RX ORDER — INSULIN GLARGINE 100 [IU]/ML
24 INJECTION, SOLUTION SUBCUTANEOUS
Status: DISCONTINUED | OUTPATIENT
Start: 2022-04-14 | End: 2022-04-16

## 2022-04-14 RX ADMIN — INSULIN GLARGINE 24 UNITS: 100 INJECTION, SOLUTION SUBCUTANEOUS at 21:25

## 2022-04-14 RX ADMIN — APIXABAN 5 MG: 5 TABLET, FILM COATED ORAL at 09:19

## 2022-04-14 RX ADMIN — Medication 7 UNITS: at 15:15

## 2022-04-14 RX ADMIN — PIPERACILLIN SODIUM AND TAZOBACTAM SODIUM 3.38 G: 3; 375 INJECTION, POWDER, FOR SOLUTION INTRAVENOUS at 06:09

## 2022-04-14 RX ADMIN — PIPERACILLIN SODIUM AND TAZOBACTAM SODIUM 3.38 G: 3; 375 INJECTION, POWDER, FOR SOLUTION INTRAVENOUS at 14:53

## 2022-04-14 RX ADMIN — ONDANSETRON 4 MG: 2 INJECTION INTRAMUSCULAR; INTRAVENOUS at 04:29

## 2022-04-14 RX ADMIN — SODIUM CHLORIDE, PRESERVATIVE FREE 10 ML: 5 INJECTION INTRAVENOUS at 14:00

## 2022-04-14 RX ADMIN — CARVEDILOL 25 MG: 12.5 TABLET, FILM COATED ORAL at 17:46

## 2022-04-14 RX ADMIN — CARVEDILOL 25 MG: 12.5 TABLET, FILM COATED ORAL at 09:20

## 2022-04-14 RX ADMIN — SODIUM CHLORIDE, PRESERVATIVE FREE 10 ML: 5 INJECTION INTRAVENOUS at 21:26

## 2022-04-14 RX ADMIN — ATORVASTATIN CALCIUM 40 MG: 40 TABLET, FILM COATED ORAL at 09:20

## 2022-04-14 RX ADMIN — APIXABAN 5 MG: 5 TABLET, FILM COATED ORAL at 21:24

## 2022-04-14 RX ADMIN — VANCOMYCIN HYDROCHLORIDE 750 MG: 750 INJECTION, POWDER, LYOPHILIZED, FOR SOLUTION INTRAVENOUS at 07:14

## 2022-04-14 RX ADMIN — LEVOTHYROXINE SODIUM 50 MCG: 0.05 TABLET ORAL at 06:09

## 2022-04-14 RX ADMIN — PIPERACILLIN SODIUM AND TAZOBACTAM SODIUM 3.38 G: 3; 375 INJECTION, POWDER, FOR SOLUTION INTRAVENOUS at 21:24

## 2022-04-14 NOTE — PROGRESS NOTES
ALFREDO: anticipate d/c home with spouse & OP PT/OT/ST, script on front of patient chart    Pt to resume home tube feedings, referral pending to [address] in all scripts/careport, pt open to their service pta    Follow up with PCP    Family transport    Will need IMM letter prior to d/c     Primary contact: SpouseTommie Letters [phone]    RUR:13%    -ST following, recs TBD  -Diabetes Management consulted, recs determined    -1430-CM reviewed pt chart & contacted pt's spouse, Stacey Wong to discuss d/c planning. As per spouse, she would like her  with d/c with OP Pt/Ot/St through [address] [place] [address]. CM sent perfect serve to Attending to inform of need for script at d/c. Spouse reported pt had prior tube feedings provider of [address] and she would like them again at d/c. Referral placed along with order to [facility] in allscripts/careport. CM to follow. 1520-Attending completed & signed script for [facility] OP services for pt. Pal Walters RN BSN CCM  Transition of Care Plan:     The Plan for Transition of Care is related to the following treatment goals: DME providers     The Patient and/or patient representative  was provided with a choice of provider and agrees  with the discharge plan. Yes [x] No []    A Freedom of choice list was provided with basic dialogue that supports the patient's individualized plan of care/goals and shares the quality data associated with the providers.        Yes [x] No []

## 2022-04-14 NOTE — DIABETES MGMT
[facility]    CLINICAL NURSE SPECIALIST CONSULT     Initial Presentation   Min Olivas is a 77 y.o. male who presented to the ED 4/11/22 with recurrent vomiting, low grade temperature and low blood sugar (70). He was seen at an OSH ED for these symptoms and CT there was concerning for aspiration pneumonia. He left the OSH ED AMA s/t long waiting time and presented for evaluation at [facility] ED. In the ED, he was tachycardic, tapchypnic and sepsis protocol initiated. The patient received fluid therapy, broad-spectrum antibiotics, and was referred to the hospitalist service for evaluation for admission    HX:   Past Medical History:   Diagnosis Date    Atrial fibrillation ([facility]     Diabetes ([address]     Hypertension     Rheumatoid arthritis ([address]     Stroke ([address]     hemorrhagic         INITIAL DX:   Sepsis ([facility] [address] [A41.9] Aspiration PNA    Current Treatment     TX: IV antibiotics    Consulted by Suzi Shelton MD for advanced diabetes nursing assessment and care for:   [x] Inpatient management strategy  Hospital Course   Clinical progress has been uncomplicated. 4/11: Admission. CT with left perihilar and lower lung airspace disease. CXR negative for acute process  Diabetes History   Type 1 Diabetes- Diagnosed 45 years ago  Ambulatory BG management provided by endocrinologist, René Moe MD  Current A1C 7.8%    Diabetes-related Medical History  Neurological complications  Peripheral neuropathy  Macrovascular disease  Cerebral vascular accident  Other associated conditions     PEG, hypothyroidism     Diabetes Medication History  Lantus: 12 units daily  Humalog: Sliding scale    Diabetes self-management practices:   Eating pattern  Jevity 1.5, 5 bolus feeds/day  Physical activity pattern  Wheelchair bound   Monitoring pattern  Wearing Dexcom CGM.   BG in the am usually around 113, daytime -230s, can get as high as 300s  Unusual for his BG to dip below 80s- has an alarm that sounds on dexcom when his BG dips below 100  Taking medications pattern  [x] Consistent administration   [x] Affordable    Social determinants of health impacting diabetes self-management practices   Concerned that you need to know more about how to stay healthy with diabetes    Overall evaluation:    [x] Achieving individualized A1c target with drug therapy & self-care practices    Subjective   I vomited last night.      Hx CVA, now wheelchair bound with PEG  , lives with his wife who assists with total care    Objective   Physical exam  General Normal weight male in no acute distress, chronically ill appearing. Weak, chronic slurred speech- baseline but able to converse and follow simple commands  Neuro  Alert, oriented   Vital Signs   Visit Vitals  BP (!) 166/85 (BP 1 Location: Left upper arm, BP Patient Position: At rest)   Pulse 90   Temp 98.1 °F (36.7 °C)   Resp 18   Ht 5' 11\" (1.803 m)   Wt 72.9 kg (160 lb 11.5 oz)   SpO2 93%   BMI 22.42 kg/m²     Skin  Warm and dry. No acanthosis noted along neckline. No lipohypertrophy or lipoatrophy noted at injection sites   Heart   Regular rate and rhythm. 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        Factors impacting BG management  Factor Dose Comments   Nutrition:  Tube feeding via PEG     Switched to osmolite 1.2 355ml x5 daily (280 g CHO/24h)   Infection Suspicion for aspiration PNA  Blood Cx NGTD  IV antibiotics       Blood glucose pattern      Significant diabetes-related events over the past 24-72 hours  Fasting B  Daytime B-237, 190 overnight  A1C 7.9%  Blood cx NGTD  Lantus: 12 units daily  Correction: 3 units in the last 24h  Some emesis overnight when feeds resumed  Wife mentioned that protonix was stopped at OSH 1 month ago and concerned with PEG position \"it is out more than usual\"      Assessment and Plan   Nursing Diagnosis Risk for unstable blood glucose pattern   Nursing Intervention Domain 5250 Decision-making Support   Nursing Interventions Examined current inpatient diabetes/blood glucose control   Explored factors facilitating and impeding inpatient management  Explored corrective strategies with patient and responsible inpatient provider   Informed patient of rational for insulin strategy while hospitalized     Evaluation   Selina Rogers \"Santillan\" Felix Gaviria is a 77year old gentleman, with Type 1 Diabetes with complications including foot wounds s/p L TMA amputation, gastroparesis and hemorrhagic CVA now wheelchair bound, admitted with aspiration pneumonia. A1C on admission is 7.9%, which is likely goal given his baseline debility. BG on admission was 191 and BG was 120-170 prior to enteral feeds resuming. Baseline low dose glargine was resumed and glucose teodoro to low 200s following enteral feeds resuming. Please continue current inpatient insulin therapy. Inpatient glucose goal is closer to 180 given decreased mobility and ability to communicate. Recommendations   1. POC glucose ACHS and overnight. [id number] Stacy Hernández for him to continue wearing his dexcom CGM but all insulin doses will be made off of hospital POC values. 2. Feeds/Diet per dietician and primary team.    3. Continue the subcutaneous Insulin Order set ():    Insulin Dosing Specific recommendation   Basal                                      (Based on weight, BMI & GFR) 12 units Lantus at bed    Corrective                                       (Useful in adjusting insulin dosing) [x] Normal sensitivity: ACHS      If vomiting continues, consider GI referral   Billing Code(s)   [x] 17069  Before making these care recommendations, I personally reviewed the hospitalization record, including notes, laboratory & diagnostic data and current medications, and examined the patient at the bedside (circumstances permitting) before making care recommendations. More than fifty (50) percent of the time was spent in patient counseling and/or care coordination.   Total minutes: 21      ALO Flores  Diabetes Clinical Nurse Specialist  [facility]  Access via [email or web address]

## 2022-04-14 NOTE — CONSULTS
[address], [facility] Hannah Farrell  [phone]        Thank you for requesting this consultation but this patient has been seen by [facility] in the past.  We will inform them of this request.    Sincerely,  TANIKA Del Toro

## 2022-04-14 NOTE — PROGRESS NOTES
[facility]  Hospitalist Group                                                                                          Hospitalist Progress Note  Jayla Decker MD  Answering service: [phone] from in house phone        Date of Service:  2022  NAME:  Bob Curtis  :  1955  MRN:  [id number]      Admission Summary:   Per H&P:  \"This is a 70-year-old man with past medical history significant for type 2 diabetes; chronic atrial fibrillation, on Eliquis for anticoagulation; status post CVA with left-sided weakness; hypothyroidism; dyslipidemia; hypertension; and dysphagia secondary to CVA, status post PEG tube placement, who was in his usual state of health until the day of his presentation at the emergency room when the patient developed vomiting. According to report, the patient woke up in the middle of the night and found to have low blood sugar. His spouse gave him some orange juice and the patient started vomiting. The vomiting was nonbilious. The patient had multiple episodes of vomiting. The following morning, the patient's temperature was reported as 99. He was taken to [facility] for further evaluation. When the patient arrived at this hospital, CT scan of the chest, abdomen and pelvis was performed. The CT of the chest shows evidence of pneumonia. There was a concern for aspiration pneumonia. The patient was going to be admitted to [facility] for further evaluation and treatment, but the patient left the hospital against medical advice because the wait was too long for him. He then came to [facility] emergency room for re-evaluation. When the patient arrived at the emergency room, the patient has tachycardia, tachypnea. There was a concern for sepsis. The sepsis protocol was initiated.   The patient received fluid therapy, broad-spectrum antibiotics, and was referred to the hospitalist service for evaluation for admission. No record of prior admission to this hospital.\"          Interval history / Subjective: Follow up nausea/vomiting. Patient seen and examined. Spoke with family on phone  Patient is not having any further vomiting since changing tube feeds from jevity to osmolite      Assessment & Plan:     Sepsis (elevated HR, RR) secondary to Aspiration pneumonia, POA:   -vomiting episode 4/11. CT chest with left perihilar and lower lung airspace disease  -could be be pneumonitis  -continue empiric antibiotics  -MRSA nasal swab positive  -speech consult  -nutrition consult    Dysphagia, s/p PEG tube  -nutrition reconsult  -changed tube feeds, resolving vomiting     Type I diabetes:   -DM consultation. lantus 12 units, SSI    Atrial fibrillation:   -continue home eliquis, carvedilol    History CVA, cardioembolic   History ICH  -has both right and left sided deficits, aphasia  -continue eliquis    Hypothyroidism: home levothyroxine  HLD: home statin  HTN: BB       Code status: full   Prophylaxis: eliquis  Care Plan discussed with: patient, wife, nurse  Anticipated Disposition: discharge 2 days      Hospital Problems  Date Reviewed: 4/11/2022          Codes Class Noted POA    * (Principal) Sepsis ([facility] [address] ICD-10-CM: A41.9  ICD-9-CM: 038.9, 995.91  4/11/2022 Yes                Review of Systems:   Negative unless stated above    Vital Signs:    Last 24hrs VS reviewed since prior progress note.  Most recent are:  Visit Vitals  BP (!) 155/78 (BP 1 Location: Left upper arm, BP Patient Position: At rest)   Pulse 92   Temp 97.8 °F (36.6 °C)   Resp 18   Ht 5' 11\" (1.803 m)   Wt 72.9 kg (160 lb 11.5 oz)   SpO2 93%   BMI 22.42 kg/m²         Intake/Output Summary (Last 24 hours) at 4/14/2022 1453  Last data filed at 4/14/2022 1200  Gross per 24 hour   Intake 1430 ml   Output 10 ml   Net 1420 ml        Physical Examination:     I had a face to face encounter with this patient and independently examined them on 4/14/2022 as outlined below:          Constitutional:  No acute distress, cooperative, pleasant    ENT:  +secretions   Resp:  Coarse bilaterally. No accessory muscle use. CV:  irregularly irregular, no murmurs, gallops, rubs    GI:  Soft, non distended, non tender. PEG tube in place, normoactive bowel sounds, no hepatosplenomegaly     Musculoskeletal:  No edema, warm, 2+ pulses throughout    Neurologic:  left sided hemiparesis, alert, asphasia             Data Review:    Review and/or order of clinical lab test  Review and/or order of tests in the radiology section of CPT  Review and/or order of tests in the medicine section of CPT      Labs:     Recent Labs     04/14/22 0354 04/13/22  0025   WBC 7.7 5.9   HGB 9.4* 9.2*   HCT 28.9* 27.9*    164     Recent Labs     04/14/22 0354 04/13/22 0025 04/12/22 0420    137 136   K 3.9 3.9 3.7   * 108 104   CO2 20* 23 22   BUN 29* 31* 34*   CREA 1.06 1.06 0.98   * 190* 153*   CA 9.0 8.5 8.8   MG 1.9 2.1 2.2   PHOS 2.3* 2.1* 2.6     Recent Labs     04/12/22  0420 04/11/22  1915   ALT 43 51   * 156*   TBILI 0.4 0.4   TP 6.9 7.8   ALB 2.8* 3.6   GLOB 4.1* 4.2*   LPSE 90  --      No results for input(s): INR, PTP, APTT, INREXT, INREXT in the last 72 hours. No results for input(s): FE, TIBC, PSAT, FERR in the last 72 hours. No results found for: FOL, RBCF   No results for input(s): PH, PCO2, PO2 in the last 72 hours. No results for input(s): CPK, CKNDX, TROIQ in the last 72 hours.     No lab exists for component: CPKMB  No results found for: CHOL, CHOLX, CHLST, CHOLV, HDL, HDLP, LDL, LDLC, DLDLP, TGLX, TRIGL, TRIGP, CHHD, CHHDX  Lab Results   Component Value Date/Time    Glucose (POC) 251 (H) 04/14/2022 11:31 AM    Glucose (POC) 120 (H) 04/14/2022 08:06 AM    Glucose (POC) 259 (H) 04/14/2022 12:16 AM    Glucose (POC) 194 (H) 04/13/2022 04:26 PM    Glucose (POC) 226 (H) 04/13/2022 11:05 AM     No results found for: COLOR, APPRN, SPGRU, REFSG, MYLES, PROTU, GLUCU, KETU, BILU, UROU, CLAIRE, LEUKU, GLUKE, EPSU, BACTU, WBCU, RBCU, CASTS, UCRY      Medications Reviewed:     Current Facility-Administered Medications   Medication Dose Route Frequency    [START ON 4/15/2022] vancomycin (VANCOCIN) 1000 mg in  ml infusion  1,000 mg IntraVENous Q18H    HYDROcodone-acetaminophen (NORCO) 5-325 mg per tablet 1 Tablet  1 Tablet Oral Q6H PRN    insulin glargine (LANTUS) injection 12 Units  12 Units SubCUTAneous QHS    hydrALAZINE (APRESOLINE) 20 mg/mL injection 10 mg  10 mg IntraVENous Q6H PRN    Vancomycin dosing per pharmacy   Other Rx Dosing/Monitoring    apixaban (ELIQUIS) tablet 5 mg  5 mg Oral Q12H    albuterol-ipratropium (DUO-NEB) 2.5 MG-0.5 MG/3 ML  3 mL Nebulization Q4H PRN    levothyroxine (SYNTHROID) tablet 50 mcg  50 mcg Oral 6am    atorvastatin (LIPITOR) tablet 40 mg  40 mg Oral DAILY    carvediloL (COREG) tablet 25 mg  25 mg Oral BID WITH MEALS    dextrose 10 % infusion 0-250 mL  0-250 mL IntraVENous PRN    sodium chloride (NS) flush 5-10 mL  5-10 mL IntraVENous PRN    sodium chloride (NS) flush 5-40 mL  5-40 mL IntraVENous Q8H    sodium chloride (NS) flush 5-40 mL  5-40 mL IntraVENous PRN    acetaminophen (TYLENOL) tablet 650 mg  650 mg Oral Q6H PRN    Or    acetaminophen (TYLENOL) suppository 650 mg  650 mg Rectal Q6H PRN    ondansetron (ZOFRAN ODT) tablet 4 mg  4 mg Oral Q8H PRN    Or    ondansetron (ZOFRAN) injection 4 mg  4 mg IntraVENous Q6H PRN    bisacodyL (DULCOLAX) suppository 10 mg  10 mg Rectal DAILY PRN    piperacillin-tazobactam (ZOSYN) 3.375 g in 0.9% sodium chloride (MBP/ADV) 100 mL MBP  3.375 g IntraVENous Q8H    insulin lispro (HUMALOG) injection   SubCUTAneous AC&HS    glucose chewable tablet 16 g  4 Tablet Oral PRN    glucagon (GLUCAGEN) injection 1 mg  1 mg IntraMUSCular PRN     ______________________________________________________________________  EXPECTED LENGTH OF STAY: 4d 19h  ACTUAL LENGTH OF STAY: [address] Patrick Farrell MD

## 2022-04-14 NOTE — PROGRESS NOTES
Problem: Dysphagia (Adult)  Goal: *Acute Goals and Plan of Care (Insert Text)  Description: Speech Therapy Goals  Initiated 4/12/2022  1. Patient will tolerate ice chips without overt s/s aspiration or adverse response within 7 days. Outcome: Progressing Towards Goal   SPEECH LANGUAGE PATHOLOGY DYSPHAGIA TREATMENT  Patient: Mary Jo Menon (47 y.o. male)  Date: 4/14/2022  Diagnosis: Sepsis ([facility] [address] [A41.9] Sepsis ([facility] [address] [facility]       Precautions: aspiration      ASSESSMENT:  Patient awake and alert, reporting persistent nausea but no vomiting. He elaborated on his recent swallow history. Apparently, there was a MBS completed at [address] [place] [address] with patient reporting recommendation for thin liquids with chin tuck. He then reports working with home health SLP, with recommendation for NPO. He was able to tolerate numerous ice chips, using chin tuck, free of s/s of aspiration. Patient would benefit from repeat imaging of swallow function, given several months since last MBS. Will hold off until vomiting is no longer a concern. PLAN:  Recommendations and Planned Interventions:  Allow ice chips and oral care  Following for imaging of swallow when appropriate. Patient continues to benefit from skilled intervention to address the above impairments. Continue treatment per established plan of care. Discharge Recommendations: To Be Determined     SUBJECTIVE:   Patient stated Eric Manner told me I could drink if I did this. OBJECTIVE:   Cognitive and Communication Status:  Neurologic State: Alert  Orientation Level: Oriented X4,Oriented to person,Oriented to place,Oriented to time,Oriented to situation  Cognition: Follows commands     Perseveration: No perseveration noted  Safety/Judgement: Awareness of environment  Dysphagia Treatment:  Oral Assessment:  Oral Assessment  Oral Hygiene: very dry  Mandible: No impairment  P.O. Trials:  Patient Position: up in bed  Vocal quality prior to P.O.: No impairment  Consistency Presented:  Ice chips  How Presented: SLP-fed/presented;Spoon     Bolus Acceptance: No impairment                    Aspiration Signs/Symptoms: None                                                                                                                                                         Pain:  Pain Scale 1: Adult Nonverbal Pain Scale  Pain Intensity 1: 0       After treatment:   Patient left in no apparent distress in bed    COMMUNICATION/EDUCATION:   Patient was educated as noted above. He agreed with plan. The patient's plan of care including recommendations, planned interventions, and recommended diet changes were discussed with: Registered nurse.      CHAD Olivier  Time Calculation: 15 mins

## 2022-04-14 NOTE — PROGRESS NOTES
Pharmacist Note - Vancomycin Dosing  Therapy day 3  Indication: PNA   Current regimen: 750 mg q12h    Recent Labs     04/14/22  0354 04/13/22  0025 04/12/22  0420   WBC 7.7 5.9 7.0   CREA 1.06 1.06 0.98   BUN 29* 31* 34*       A random vancomycin level of 17.1 mcg/mL was obtained and from this level, the patient's AUC24 is calculated to be 561 with the current regimen. Goal target range AUC/SERENA 400-600      Plan: Change to 1000 mg q18h to yield a predicted FRO=384 . Pharmacy will continue to monitor this patient daily for changes in clinical status and renal function. Melissa Price PharmD      *Random vancomycin levels are used to calculate AUC/SERENA, this level should not be interpreted as a trough. Vancomycin has been dosed using Bayesian kinetics software to target an AUC24:SERENA of 400-600, which provides adequate exposure for as assumed infection due to MRSA with an SERENA of 1 or less while reducing the risk of nephrotoxicity as seen with traditional trough based dosing goals.

## 2022-04-14 NOTE — PROGRESS NOTES
Report given to [facility] on [employer] given and all questions answered. Transfer services called and wife Washington Ramirezr called and updated.

## 2022-04-15 LAB
ANION GAP SERPL CALC-SCNC: 7 MMOL/L (ref 5–15)
BUN SERPL-MCNC: 25 MG/DL (ref 6–20)
BUN/CREAT SERPL: 25 (ref 12–20)
CALCIUM SERPL-MCNC: 8.7 MG/DL (ref 8.5–10.1)
CHLORIDE SERPL-SCNC: 111 MMOL/L (ref 97–108)
CO2 SERPL-SCNC: 22 MMOL/L (ref 21–32)
CREAT SERPL-MCNC: 1.02 MG/DL (ref 0.7–1.3)
GLUCOSE BLD STRIP.AUTO-MCNC: 154 MG/DL (ref 65–117)
GLUCOSE BLD STRIP.AUTO-MCNC: 158 MG/DL (ref 65–117)
GLUCOSE BLD STRIP.AUTO-MCNC: 221 MG/DL (ref 65–117)
GLUCOSE BLD STRIP.AUTO-MCNC: 260 MG/DL (ref 65–117)
GLUCOSE SERPL-MCNC: 210 MG/DL (ref 65–100)
POTASSIUM SERPL-SCNC: 3.6 MMOL/L (ref 3.5–5.1)
SERVICE CMNT-IMP: ABNORMAL
SODIUM SERPL-SCNC: 140 MMOL/L (ref 136–145)

## 2022-04-15 PROCEDURE — 74011636637 HC RX REV CODE- 636/637: Performed by: HOSPITALIST

## 2022-04-15 PROCEDURE — 36415 COLL VENOUS BLD VENIPUNCTURE: CPT

## 2022-04-15 PROCEDURE — 80048 BASIC METABOLIC PNL TOTAL CA: CPT

## 2022-04-15 PROCEDURE — 74011250637 HC RX REV CODE- 250/637: Performed by: NURSE PRACTITIONER

## 2022-04-15 PROCEDURE — 82962 GLUCOSE BLOOD TEST: CPT

## 2022-04-15 PROCEDURE — 74011250636 HC RX REV CODE- 250/636: Performed by: INTERNAL MEDICINE

## 2022-04-15 PROCEDURE — 65270000029 HC RM PRIVATE

## 2022-04-15 PROCEDURE — 74011000258 HC RX REV CODE- 258: Performed by: INTERNAL MEDICINE

## 2022-04-15 PROCEDURE — 74011250637 HC RX REV CODE- 250/637: Performed by: INTERNAL MEDICINE

## 2022-04-15 PROCEDURE — 74011000250 HC RX REV CODE- 250: Performed by: INTERNAL MEDICINE

## 2022-04-15 PROCEDURE — 74011250636 HC RX REV CODE- 250/636: Performed by: HOSPITALIST

## 2022-04-15 PROCEDURE — 74011636637 HC RX REV CODE- 636/637: Performed by: INTERNAL MEDICINE

## 2022-04-15 RX ADMIN — Medication 2 UNITS: at 22:41

## 2022-04-15 RX ADMIN — VANCOMYCIN HYDROCHLORIDE 1000 MG: 10 INJECTION, POWDER, LYOPHILIZED, FOR SOLUTION INTRAVENOUS at 01:52

## 2022-04-15 RX ADMIN — LEVOTHYROXINE SODIUM 50 MCG: 0.05 TABLET ORAL at 05:31

## 2022-04-15 RX ADMIN — SODIUM CHLORIDE, PRESERVATIVE FREE 10 ML: 5 INJECTION INTRAVENOUS at 21:48

## 2022-04-15 RX ADMIN — SODIUM CHLORIDE, PRESERVATIVE FREE 10 ML: 5 INJECTION INTRAVENOUS at 05:32

## 2022-04-15 RX ADMIN — Medication 5 UNITS: at 17:34

## 2022-04-15 RX ADMIN — APIXABAN 5 MG: 5 TABLET, FILM COATED ORAL at 21:47

## 2022-04-15 RX ADMIN — ATORVASTATIN CALCIUM 40 MG: 40 TABLET, FILM COATED ORAL at 08:41

## 2022-04-15 RX ADMIN — Medication 2 UNITS: at 11:38

## 2022-04-15 RX ADMIN — INSULIN GLARGINE 24 UNITS: 100 INJECTION, SOLUTION SUBCUTANEOUS at 22:41

## 2022-04-15 RX ADMIN — PIPERACILLIN SODIUM AND TAZOBACTAM SODIUM 3.38 G: 3; 375 INJECTION, POWDER, FOR SOLUTION INTRAVENOUS at 05:31

## 2022-04-15 RX ADMIN — PIPERACILLIN SODIUM AND TAZOBACTAM SODIUM 3.38 G: 3; 375 INJECTION, POWDER, FOR SOLUTION INTRAVENOUS at 14:24

## 2022-04-15 RX ADMIN — VANCOMYCIN HYDROCHLORIDE 1000 MG: 10 INJECTION, POWDER, LYOPHILIZED, FOR SOLUTION INTRAVENOUS at 19:56

## 2022-04-15 RX ADMIN — CARVEDILOL 25 MG: 12.5 TABLET, FILM COATED ORAL at 08:41

## 2022-04-15 RX ADMIN — HYDROCODONE BITARTRATE AND ACETAMINOPHEN 1 TABLET: 5; 325 TABLET ORAL at 16:33

## 2022-04-15 RX ADMIN — Medication 2 UNITS: at 06:30

## 2022-04-15 RX ADMIN — CARVEDILOL 25 MG: 12.5 TABLET, FILM COATED ORAL at 16:33

## 2022-04-15 RX ADMIN — PIPERACILLIN SODIUM AND TAZOBACTAM SODIUM 3.38 G: 3; 375 INJECTION, POWDER, FOR SOLUTION INTRAVENOUS at 21:47

## 2022-04-15 RX ADMIN — APIXABAN 5 MG: 5 TABLET, FILM COATED ORAL at 08:41

## 2022-04-15 NOTE — PROGRESS NOTES
Problem: Pressure Injury - Risk of  Goal: *Prevention of pressure injury  Description: Document Rajan Scale and appropriate interventions in the flowsheet. Outcome: Progressing Towards Goal  Note: Pressure Injury Interventions:  Sensory Interventions: Assess changes in LOC,Keep linens dry and wrinkle-free,Turn and reposition approx. every two hours (pillows and wedges if needed)    Moisture Interventions: Absorbent underpads,Apply protective barrier, creams and emollients,Minimize layers    Activity Interventions: Increase time out of bed    Mobility Interventions: Float heels,Suspension boots    Nutrition Interventions: Document food/fluid/supplement intake    Friction and Shear Interventions: Apply protective barrier, creams and emollients                Problem: Patient Education: Go to Patient Education Activity  Goal: Patient/Family Education  Outcome: Progressing Towards Goal     Problem: Falls - Risk of  Goal: *Absence of Falls  Description: Document Ruth Fall Risk and appropriate interventions in the flowsheet.   Outcome: Progressing Towards Goal  Note: Fall Risk Interventions:  Mobility Interventions: Patient to call before getting OOB,Communicate number of staff needed for ambulation/transfer         Medication Interventions: Patient to call before getting OOB    Elimination Interventions: Patient to call for help with toileting needs,Toileting schedule/hourly rounds

## 2022-04-15 NOTE — PROGRESS NOTES
[address] [facility] Adult  Hospitalist Group                                                                                          Hospitalist Progress Note  Alejandro Duval MD  Answering service: [phone] from in house phone        Date of Service:  4/15/2022  NAME:  Brian Clemens  :  1955  MRN:  [id number]      Admission Summary:   Per H&P:  \"This is a 77-year-old man with past medical history significant for type 2 diabetes; chronic atrial fibrillation, on Eliquis for anticoagulation; status post CVA with left-sided weakness; hypothyroidism; dyslipidemia; hypertension; and dysphagia secondary to CVA, status post PEG tube placement, who was in his usual state of health until the day of his presentation at the emergency room when the patient developed vomiting. According to report, the patient woke up in the middle of the night and found to have low blood sugar. His spouse gave him some orange juice and the patient started vomiting. The vomiting was nonbilious. The patient had multiple episodes of vomiting. The following morning, the patient's temperature was reported as 99. He was taken to [facility] for further evaluation. When the patient arrived at this hospital, CT scan of the chest, abdomen and pelvis was performed. The CT of the chest shows evidence of pneumonia. There was a concern for aspiration pneumonia. The patient was going to be admitted to [facility] for further evaluation and treatment, but the patient left the hospital against medical advice because the wait was too long for him. He then came to [facility] emergency room for re-evaluation. When the patient arrived at the emergency room, the patient has tachycardia, tachypnea. There was a concern for sepsis. The sepsis protocol was initiated.   The patient received fluid therapy, broad-spectrum antibiotics, and was referred to the hospitalist service for evaluation for admission. No record of prior admission to this hospital.\"          Interval history / Subjective: Follow up nausea/vomiting. Patient seen and examined. Awake and alert  No further vomiting noted        Assessment & Plan:     Sepsis (elevated HR, RR) secondary to Aspiration pneumonia, POA:   -vomiting episode 4/11. CT chest with left perihilar and lower lung airspace disease  -could be be pneumonitis  -continue empiric antibiotics  -MRSA nasal swab positive  -speech consult  -nutrition consult    Dysphagia, s/p PEG tube  -nutrition reconsult  -changed tube feeds with resolution of vomiting     Type I diabetes:   -DM consultation. lantus 12 units, SSI, lantus increased     Atrial fibrillation:   -continue home eliquis, carvedilol    History CVA, cardioembolic   History ICH  -has both right and left sided deficits, aphasia  -continue eliquis    Hypothyroidism: home levothyroxine  HLD: home statin  HTN: BB       Code status: full   Prophylaxis: eliquis  Care Plan discussed with: patient, wife, nurse  Anticipated Disposition: discharge in AM      Hospital Problems  Date Reviewed: 4/11/2022          Codes Class Noted POA    * (Principal) Sepsis ([facility] [address] ICD-10-CM: A41.9  ICD-9-CM: 038.9, 995.91  4/11/2022 Yes                Review of Systems:   Negative unless stated above    Vital Signs:    Last 24hrs VS reviewed since prior progress note.  Most recent are:  Visit Vitals  BP (!) 159/72   Pulse 99   Temp 98 °F (36.7 °C)   Resp 18   Ht 5' 11\" (1.803 m)   Wt 73 kg (160 lb 15 oz)   SpO2 95%   BMI 22.45 kg/m²         Intake/Output Summary (Last 24 hours) at 4/15/2022 1259  Last data filed at 4/15/2022 2243  Gross per 24 hour   Intake 1425 ml   Output 400 ml   Net 1025 ml        Physical Examination:     I had a face to face encounter with this patient and independently examined them on 4/15/2022 as outlined below:          Constitutional:  No acute distress, cooperative, pleasant    ENT:  +secretions   Resp: Coarse bilaterally. No accessory muscle use. CV:  irregularly irregular, no murmurs, gallops, rubs    GI:  Soft, non distended, non tender. PEG tube in place, normoactive bowel sounds, no hepatosplenomegaly     Musculoskeletal:  No edema, warm, 2+ pulses throughout    Neurologic:  left sided hemiparesis, alert, asphasia             Data Review:    Review and/or order of clinical lab test  Review and/or order of tests in the radiology section of CPT  Review and/or order of tests in the medicine section of CPT      Labs:     Recent Labs     04/14/22  0354 04/13/22  0025   WBC 7.7 5.9   HGB 9.4* 9.2*   HCT 28.9* 27.9*    164     Recent Labs     04/15/22  0251 04/14/22  0354 04/13/22  0025    139 137   K 3.6 3.9 3.9   * 111* 108   CO2 22 20* 23   BUN 25* 29* 31*   CREA 1.02 1.06 1.06   * 165* 190*   CA 8.7 9.0 8.5   MG  --  1.9 2.1   PHOS  --  2.3* 2.1*     No results for input(s): ALT, AP, TBIL, TBILI, TP, ALB, GLOB, GGT, AML, LPSE in the last 72 hours. No lab exists for component: SGOT, GPT, AMYP, HLPSE  No results for input(s): INR, PTP, APTT, INREXT, INREXT in the last 72 hours. No results for input(s): FE, TIBC, PSAT, FERR in the last 72 hours. No results found for: FOL, RBCF   No results for input(s): PH, PCO2, PO2 in the last 72 hours. No results for input(s): CPK, CKNDX, TROIQ in the last 72 hours.     No lab exists for component: CPKMB  No results found for: CHOL, CHOLX, CHLST, CHOLV, HDL, HDLP, LDL, LDLC, DLDLP, TGLX, TRIGL, TRIGP, CHHD, CHHDX  Lab Results   Component Value Date/Time    Glucose (POC) 154 (H) 04/15/2022 11:13 AM    Glucose (POC) 158 (H) 04/15/2022 06:12 AM    Glucose (POC) 331 (H) 04/14/2022 02:54 PM    Glucose (POC) 251 (H) 04/14/2022 11:31 AM    Glucose (POC) 120 (H) 04/14/2022 08:06 AM     No results found for: COLOR, APPRN, SPGRU, REFSG, MYLES, PROTU, GLUCU, KETU, BILU, UROU, CLAIRE, LEUKU, GLUKE, EPSU, BACTU, WBCU, RBCU, CASTS, UCRY      Medications Reviewed:     Current Facility-Administered Medications   Medication Dose Route Frequency    vancomycin (VANCOCIN) 1000 mg in  ml infusion  1,000 mg IntraVENous Q18H    insulin glargine (LANTUS) injection 24 Units  24 Units SubCUTAneous QHS    HYDROcodone-acetaminophen (NORCO) 5-325 mg per tablet 1 Tablet  1 Tablet Oral Q6H PRN    hydrALAZINE (APRESOLINE) 20 mg/mL injection 10 mg  10 mg IntraVENous Q6H PRN    Vancomycin dosing per pharmacy   Other Rx Dosing/Monitoring    apixaban (ELIQUIS) tablet 5 mg  5 mg Oral Q12H    albuterol-ipratropium (DUO-NEB) 2.5 MG-0.5 MG/3 ML  3 mL Nebulization Q4H PRN    levothyroxine (SYNTHROID) tablet 50 mcg  50 mcg Oral 6am    atorvastatin (LIPITOR) tablet 40 mg  40 mg Oral DAILY    carvediloL (COREG) tablet 25 mg  25 mg Oral BID WITH MEALS    dextrose 10 % infusion 0-250 mL  0-250 mL IntraVENous PRN    sodium chloride (NS) flush 5-10 mL  5-10 mL IntraVENous PRN    sodium chloride (NS) flush 5-40 mL  5-40 mL IntraVENous Q8H    sodium chloride (NS) flush 5-40 mL  5-40 mL IntraVENous PRN    acetaminophen (TYLENOL) tablet 650 mg  650 mg Oral Q6H PRN    Or    acetaminophen (TYLENOL) suppository 650 mg  650 mg Rectal Q6H PRN    ondansetron (ZOFRAN ODT) tablet 4 mg  4 mg Oral Q8H PRN    Or    ondansetron (ZOFRAN) injection 4 mg  4 mg IntraVENous Q6H PRN    bisacodyL (DULCOLAX) suppository 10 mg  10 mg Rectal DAILY PRN    piperacillin-tazobactam (ZOSYN) 3.375 g in 0.9% sodium chloride (MBP/ADV) 100 mL MBP  3.375 g IntraVENous Q8H    insulin lispro (HUMALOG) injection   SubCUTAneous AC&HS    glucose chewable tablet 16 g  4 Tablet Oral PRN    glucagon (GLUCAGEN) injection 1 mg  1 mg IntraMUSCular PRN     ______________________________________________________________________  EXPECTED LENGTH OF STAY: 4d 19h  ACTUAL LENGTH OF STAY:          515 Seth Martinez MD

## 2022-04-15 NOTE — DIABETES MGMT
[facility]    CLINICAL NURSE SPECIALIST CONSULT     Initial Presentation   Teo Siegel is a 77 y.o. male who presented to the ED 4/11/22 with recurrent vomiting, low grade temperature and low blood sugar (70). He was seen at an OSH ED for these symptoms and CT there was concerning for aspiration pneumonia. He left the OSH ED AMA s/t long waiting time and presented for evaluation at [facility] ED. In the ED, he was tachycardic, tapchypnic and sepsis protocol initiated. The patient received fluid therapy, broad-spectrum antibiotics, and was referred to the hospitalist service for evaluation for admission    HX:   Past Medical History:   Diagnosis Date    Atrial fibrillation ([facility] [address]     Diabetes ([place] [address]     Hypertension     Rheumatoid arthritis ([facility] [address]     Stroke ([facility] [address]     hemorrhagic         INITIAL DX:   Sepsis ([facility] [address] [A41.9]     Current Treatment     TX: IV antibiotics    Consulted by Demetrio Dancer, MD for advanced diabetes nursing assessment and care for:   [x] Inpatient management strategy  Hospital Course   Clinical progress has been uncomplicated. 4/11: Admission. CT with left perihilar and lower lung airspace disease. CXR negative for acute process  Diabetes History   Type 1 Diabetes- Diagnosed 45 years ago  Ambulatory BG management provided by endocrinologist, Dossie Alpers, MD  Current A1C 7.8%    Diabetes-related Medical History  Neurological complications  Peripheral neuropathy  Macrovascular disease  Cerebral vascular accident  Other associated conditions     PEG, hypothyroidism     Diabetes Medication History  Lantus: 12 units daily  Humalog: Sliding scale    Diabetes self-management practices:   Eating pattern  Jevity 1.5, 5 bolus feeds/day  Physical activity pattern  Wheelchair bound   Monitoring pattern  Wearing Dexcom CGM.   BG in the am usually around 113, daytime -230s, can get as high as 300s  Unusual for his BG to dip below 80s- has an alarm that sounds on dexcom when his BG dips below 100  Taking medications pattern  [x] Consistent administration   [x] Affordable    Social determinants of health impacting diabetes self-management practices   Concerned that you need to know more about how to stay healthy with diabetes    Overall evaluation:    [x] Achieving individualized A1c target with drug therapy & self-care practices    Subjective   I vomited last night.      Hx CVA, now wheelchair bound with PEG  , lives with his wife who assists with total care    Objective   Physical exam  General Normal weight male in no acute distress, chronically ill appearing. Weak, chronic slurred speech- baseline but able to converse and follow simple commands  Neuro  Alert, oriented   Vital Signs   Visit Vitals  BP (!) 159/72   Pulse 99   Temp 98 °F (36.7 °C)   Resp 18   Ht 5' 11\" (1.803 m)   Wt 73 kg (160 lb 15 oz)   SpO2 95%   BMI 22.45 kg/m²     Skin  Warm and dry. No acanthosis noted along neckline. No lipohypertrophy or lipoatrophy noted at injection sites   Heart   Regular rate and rhythm.  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        Laboratory  Recent Labs     04/15/22  0251 22  0354 22  0025   * 165* 190*   AGAP 7 8 6   WBC  --  7.7 5.9   CREA 1.02 1.06 1.06   GFRNA >60 >60 >60       Factors impacting BG management  Factor Dose Comments   Nutrition:  Tube feeding via PEG     284 mL of Jevity 1.5, 5 times/day     Infection Suspicion for aspiration PNA  Blood Cx NGTD  IV antibiotics       Blood glucose pattern      Significant diabetes-related events over the past 24-72 hours  Fasting B  Daytime B-237, 190 overnight  A1C 7.9%  Blood cx NGTD  Lantus: 12 units daily  Correction: 3 units in the last 24h  Some emesis overnight when feeds resumed  Wife mentioned that protonix was stopped at OSH 1 month ago and concerned with PEG position \"it is out more than usual\"  4/15: FBG in target 158-Noted basal insulin was advanced to 24units on 4/14. Assessment and Plan   Nursing Diagnosis Risk for unstable blood glucose pattern   Nursing Intervention Domain 4023 Decision-making Support   Nursing Interventions Examined current inpatient diabetes/blood glucose control   Explored factors facilitating and impeding inpatient management  Explored corrective strategies with patient and responsible inpatient provider   Informed patient of rational for insulin strategy while hospitalized     Evaluation   Omayra Nelson \"Santillan\" Caroline Purcell is a 77year old gentleman, with Type 1 Diabetes with complications including foot wounds s/p L TMA amputation, gastroparesis and hemorrhagic CVA now wheelchair bound, admitted with aspiration pneumonia. A1C on admission is 7.9%, which is likely goal given his baseline debility. BG on admission was 191 and BG was 120-170 prior to enteral feeds resuming. Baseline low dose glargine was resumed and glucose teodoro to low 200s following enteral feeds resuming. Please continue current inpatient insulin therapy. Inpatient glucose goal is closer to 180 given decreased mobility and ability to communicate. 4/15: FBG improved today with advancement of PM lantus dose by MD.   Recommendations   1. POC glucose ACHS and overnight. Dickson Foote for him to continue wearing his dexcom CGM but all insulin doses will be made off of hospital POC values. 2. Feeds per dietician     3. Continue the subcutaneous Insulin Order set (5363):    Insulin Dosing Specific recommendation   MD ADJUSTED Basal                                      (Based on weight, BMI & GFR) 24 units Lantus at bed    Corrective                                       (Useful in adjusting insulin dosing) [x] Normal sensitivity: ACHS      If vomiting continues, consider GI referral   Billing Code(s)   [x] 68870  Before making these care recommendations, I personally reviewed the hospitalization record, including notes, laboratory & diagnostic data and current medications, and examined the patient at the bedside (circumstances permitting) before making care recommendations. More than fifty (50) percent of the time was spent in patient counseling and/or care coordination.   Total minutes: Marciano 35 ALO Ortiz  Diabetes Clinical Nurse Specialist  [facility]  Access via [employer]

## 2022-04-16 ENCOUNTER — APPOINTMENT (OUTPATIENT)
Dept: GENERAL RADIOLOGY | Age: 67
DRG: 871 | End: 2022-04-16
Attending: HOSPITALIST
Payer: MEDICARE

## 2022-04-16 LAB
BACTERIA SPEC CULT: NORMAL
GLUCOSE BLD STRIP.AUTO-MCNC: 126 MG/DL (ref 65–117)
GLUCOSE BLD STRIP.AUTO-MCNC: 140 MG/DL (ref 65–117)
GLUCOSE BLD STRIP.AUTO-MCNC: 167 MG/DL (ref 65–117)
GLUCOSE BLD STRIP.AUTO-MCNC: 239 MG/DL (ref 65–117)
GLUCOSE BLD STRIP.AUTO-MCNC: 468 MG/DL (ref 65–117)
GLUCOSE BLD STRIP.AUTO-MCNC: 53 MG/DL (ref 65–117)
GLUCOSE BLD STRIP.AUTO-MCNC: 58 MG/DL (ref 65–117)
GLUCOSE BLD STRIP.AUTO-MCNC: 74 MG/DL (ref 65–117)
GLUCOSE BLD STRIP.AUTO-MCNC: 88 MG/DL (ref 65–117)
GLUCOSE BLD STRIP.AUTO-MCNC: 92 MG/DL (ref 65–117)
SERVICE CMNT-IMP: ABNORMAL
SERVICE CMNT-IMP: NORMAL

## 2022-04-16 PROCEDURE — 74011000250 HC RX REV CODE- 250: Performed by: NURSE PRACTITIONER

## 2022-04-16 PROCEDURE — 74011000250 HC RX REV CODE- 250: Performed by: INTERNAL MEDICINE

## 2022-04-16 PROCEDURE — 65270000029 HC RM PRIVATE

## 2022-04-16 PROCEDURE — 74011250636 HC RX REV CODE- 250/636: Performed by: HOSPITALIST

## 2022-04-16 PROCEDURE — 74011250637 HC RX REV CODE- 250/637: Performed by: HOSPITALIST

## 2022-04-16 PROCEDURE — 74011636637 HC RX REV CODE- 636/637: Performed by: INTERNAL MEDICINE

## 2022-04-16 PROCEDURE — 74011250637 HC RX REV CODE- 250/637: Performed by: INTERNAL MEDICINE

## 2022-04-16 PROCEDURE — 71045 X-RAY EXAM CHEST 1 VIEW: CPT

## 2022-04-16 PROCEDURE — 94664 DEMO&/EVAL PT USE INHALER: CPT

## 2022-04-16 PROCEDURE — 74011000258 HC RX REV CODE- 258: Performed by: INTERNAL MEDICINE

## 2022-04-16 PROCEDURE — 74011000250 HC RX REV CODE- 250: Performed by: HOSPITALIST

## 2022-04-16 PROCEDURE — 94640 AIRWAY INHALATION TREATMENT: CPT

## 2022-04-16 PROCEDURE — 82962 GLUCOSE BLOOD TEST: CPT

## 2022-04-16 PROCEDURE — 74011250636 HC RX REV CODE- 250/636: Performed by: INTERNAL MEDICINE

## 2022-04-16 RX ORDER — DANTROLENE SODIUM 25 MG/1
25 CAPSULE ORAL 3 TIMES DAILY
Status: DISCONTINUED | OUTPATIENT
Start: 2022-04-16 | End: 2022-04-17 | Stop reason: HOSPADM

## 2022-04-16 RX ORDER — DILTIAZEM HYDROCHLORIDE 60 MG/1
60 TABLET, FILM COATED ORAL 2 TIMES DAILY
COMMUNITY

## 2022-04-16 RX ORDER — MIDODRINE HYDROCHLORIDE 2.5 MG/1
2.5 TABLET ORAL 2 TIMES DAILY
Status: DISCONTINUED | OUTPATIENT
Start: 2022-04-16 | End: 2022-04-17 | Stop reason: HOSPADM

## 2022-04-16 RX ORDER — HYDROCORTISONE 5 MG/1
5 TABLET ORAL DAILY
COMMUNITY

## 2022-04-16 RX ORDER — ARFORMOTEROL TARTRATE 15 UG/2ML
15 SOLUTION RESPIRATORY (INHALATION)
Status: DISCONTINUED | OUTPATIENT
Start: 2022-04-16 | End: 2022-04-16

## 2022-04-16 RX ORDER — SERTRALINE HYDROCHLORIDE 50 MG/1
200 TABLET, FILM COATED ORAL
Status: DISCONTINUED | OUTPATIENT
Start: 2022-04-16 | End: 2022-04-17 | Stop reason: HOSPADM

## 2022-04-16 RX ORDER — INSULIN GLARGINE 100 [IU]/ML
15 INJECTION, SOLUTION SUBCUTANEOUS
Status: DISCONTINUED | OUTPATIENT
Start: 2022-04-16 | End: 2022-04-17 | Stop reason: HOSPADM

## 2022-04-16 RX ORDER — BUDESONIDE 0.5 MG/2ML
500 INHALANT ORAL
Status: DISCONTINUED | OUTPATIENT
Start: 2022-04-16 | End: 2022-04-17 | Stop reason: HOSPADM

## 2022-04-16 RX ORDER — DILTIAZEM HYDROCHLORIDE 60 MG/1
60 TABLET, FILM COATED ORAL 2 TIMES DAILY
Status: DISCONTINUED | OUTPATIENT
Start: 2022-04-16 | End: 2022-04-17 | Stop reason: HOSPADM

## 2022-04-16 RX ORDER — HYDROCORTISONE 10 MG/1
5 TABLET ORAL DAILY
Status: DISCONTINUED | OUTPATIENT
Start: 2022-04-16 | End: 2022-04-17 | Stop reason: HOSPADM

## 2022-04-16 RX ORDER — BUDESONIDE 0.5 MG/2ML
500 INHALANT ORAL DAILY
COMMUNITY

## 2022-04-16 RX ORDER — DEXTROSE MONOHYDRATE AND SODIUM CHLORIDE 5; .45 G/100ML; G/100ML
50 INJECTION, SOLUTION INTRAVENOUS CONTINUOUS
Status: DISCONTINUED | OUTPATIENT
Start: 2022-04-16 | End: 2022-04-16

## 2022-04-16 RX ORDER — ARFORMOTEROL TARTRATE 15 UG/2ML
15 SOLUTION RESPIRATORY (INHALATION) DAILY
COMMUNITY

## 2022-04-16 RX ORDER — CARVEDILOL 25 MG/1
25 TABLET ORAL 2 TIMES DAILY
COMMUNITY

## 2022-04-16 RX ORDER — MIDODRINE HYDROCHLORIDE 2.5 MG/1
2.5 TABLET ORAL 2 TIMES DAILY
COMMUNITY

## 2022-04-16 RX ORDER — BUDESONIDE 0.5 MG/2ML
500 INHALANT ORAL
Status: DISCONTINUED | OUTPATIENT
Start: 2022-04-16 | End: 2022-04-16

## 2022-04-16 RX ORDER — ATORVASTATIN CALCIUM 40 MG/1
40 TABLET, FILM COATED ORAL
COMMUNITY

## 2022-04-16 RX ORDER — TRAZODONE HYDROCHLORIDE 100 MG/1
100 TABLET ORAL
Status: DISCONTINUED | OUTPATIENT
Start: 2022-04-16 | End: 2022-04-17 | Stop reason: HOSPADM

## 2022-04-16 RX ORDER — POTASSIUM CHLORIDE 1.5 G/1.77G
20 POWDER, FOR SOLUTION ORAL
COMMUNITY

## 2022-04-16 RX ORDER — FUROSEMIDE 40 MG/1
40 TABLET ORAL
Status: DISCONTINUED | OUTPATIENT
Start: 2022-04-17 | End: 2022-04-17 | Stop reason: HOSPADM

## 2022-04-16 RX ORDER — FUROSEMIDE 40 MG/1
40 TABLET ORAL
Status: ON HOLD | COMMUNITY
End: 2022-05-29 | Stop reason: SDUPTHER

## 2022-04-16 RX ORDER — TRAZODONE HYDROCHLORIDE 50 MG/1
150 TABLET ORAL
COMMUNITY

## 2022-04-16 RX ORDER — SERTRALINE HYDROCHLORIDE 100 MG/1
200 TABLET, FILM COATED ORAL
COMMUNITY

## 2022-04-16 RX ORDER — FOLIC ACID 1 MG/1
1 TABLET ORAL DAILY
Status: DISCONTINUED | OUTPATIENT
Start: 2022-04-17 | End: 2022-04-17 | Stop reason: HOSPADM

## 2022-04-16 RX ORDER — ARFORMOTEROL TARTRATE 15 UG/2ML
15 SOLUTION RESPIRATORY (INHALATION)
Status: DISCONTINUED | OUTPATIENT
Start: 2022-04-16 | End: 2022-04-17 | Stop reason: HOSPADM

## 2022-04-16 RX ORDER — DANTROLENE SODIUM 25 MG/1
25 CAPSULE ORAL 3 TIMES DAILY
COMMUNITY

## 2022-04-16 RX ADMIN — CARVEDILOL 25 MG: 12.5 TABLET, FILM COATED ORAL at 07:00

## 2022-04-16 RX ADMIN — PIPERACILLIN SODIUM AND TAZOBACTAM SODIUM 3.38 G: 3; 375 INJECTION, POWDER, FOR SOLUTION INTRAVENOUS at 12:38

## 2022-04-16 RX ADMIN — HYDROCORTISONE 5 MG: 10 TABLET ORAL at 12:38

## 2022-04-16 RX ADMIN — ARFORMOTEROL TARTRATE 15 MCG: 15 SOLUTION RESPIRATORY (INHALATION) at 20:34

## 2022-04-16 RX ADMIN — HYDRALAZINE HYDROCHLORIDE 10 MG: 20 INJECTION INTRAMUSCULAR; INTRAVENOUS at 03:54

## 2022-04-16 RX ADMIN — BUDESONIDE 500 MCG: 0.5 INHALANT RESPIRATORY (INHALATION) at 20:34

## 2022-04-16 RX ADMIN — DEXTROSE AND SODIUM CHLORIDE 50 ML/HR: 5; 450 INJECTION, SOLUTION INTRAVENOUS at 06:00

## 2022-04-16 RX ADMIN — CARVEDILOL 25 MG: 12.5 TABLET, FILM COATED ORAL at 18:00

## 2022-04-16 RX ADMIN — DILTIAZEM HYDROCHLORIDE 60 MG: 60 TABLET, FILM COATED ORAL at 23:08

## 2022-04-16 RX ADMIN — TRAZODONE HYDROCHLORIDE 100 MG: 100 TABLET ORAL at 23:08

## 2022-04-16 RX ADMIN — DANTROLENE SODIUM 25 MG: 25 CAPSULE ORAL at 18:03

## 2022-04-16 RX ADMIN — Medication 14 UNITS: at 17:58

## 2022-04-16 RX ADMIN — APIXABAN 5 MG: 5 TABLET, FILM COATED ORAL at 11:08

## 2022-04-16 RX ADMIN — LEVOTHYROXINE SODIUM 50 MCG: 0.05 TABLET ORAL at 06:15

## 2022-04-16 RX ADMIN — SODIUM CHLORIDE, PRESERVATIVE FREE 10 ML: 5 INJECTION INTRAVENOUS at 23:09

## 2022-04-16 RX ADMIN — DEXTROSE 250 ML: 10 SOLUTION INTRAVENOUS at 04:03

## 2022-04-16 RX ADMIN — PIPERACILLIN SODIUM AND TAZOBACTAM SODIUM 3.38 G: 3; 375 INJECTION, POWDER, FOR SOLUTION INTRAVENOUS at 23:08

## 2022-04-16 RX ADMIN — DANTROLENE SODIUM 25 MG: 25 CAPSULE ORAL at 23:12

## 2022-04-16 RX ADMIN — SODIUM CHLORIDE, PRESERVATIVE FREE 10 ML: 5 INJECTION INTRAVENOUS at 14:53

## 2022-04-16 RX ADMIN — ATORVASTATIN CALCIUM 40 MG: 40 TABLET, FILM COATED ORAL at 10:32

## 2022-04-16 RX ADMIN — MIDODRINE HYDROCHLORIDE 2.5 MG: 2.5 TABLET ORAL at 12:37

## 2022-04-16 RX ADMIN — SERTRALINE 200 MG: 50 TABLET, FILM COATED ORAL at 12:39

## 2022-04-16 RX ADMIN — HYDRALAZINE HYDROCHLORIDE 10 MG: 20 INJECTION INTRAMUSCULAR; INTRAVENOUS at 18:02

## 2022-04-16 RX ADMIN — PIPERACILLIN SODIUM AND TAZOBACTAM SODIUM 3.38 G: 3; 375 INJECTION, POWDER, FOR SOLUTION INTRAVENOUS at 04:00

## 2022-04-16 RX ADMIN — APIXABAN 5 MG: 5 TABLET, FILM COATED ORAL at 23:08

## 2022-04-16 RX ADMIN — DILTIAZEM HYDROCHLORIDE 60 MG: 60 TABLET, FILM COATED ORAL at 12:39

## 2022-04-16 RX ADMIN — VANCOMYCIN HYDROCHLORIDE 1000 MG: 10 INJECTION, POWDER, LYOPHILIZED, FOR SOLUTION INTRAVENOUS at 15:24

## 2022-04-16 NOTE — PROGRESS NOTES
0400: Patients blood sugar was taken and it was 53. Patient was given Dextrose as directed in the PRN mediations for hypoglycemia. 0444: Blood sugar was rechecked and it was 126. Patient was left resting comfortably and stated he did not need anything else. Will continue to monitor patient and blood sugar. 9404: Patient asked for blood sugar to be rechecked, stated he was not feeling well Blood sugar was 92.     0520: This RN contacted on-call dr who then prescribed D5 at 50ml/hr. [phone]: Patient asked this RN to recheck blood sugar, he felt like it was low again. Blood sugar was 58. Dextrose as directed in PRN medications to maintain glucose.     0703: Blood sugar was rechecked after dextrose was given. It was 167. Left patient resting comfortably and will continue to monitor.

## 2022-04-16 NOTE — PROGRESS NOTES
[address] [facility] Adult  Hospitalist Group                                                                                          Hospitalist Progress Note  Cheryl Lozano MD  Answering service: [phone] from in house phone        Date of Service:  2022  NAME:  Stefan Moore  :  1955  MRN:  [id number]      Admission Summary:   Per H&P:  \"This is a 71-year-old man with past medical history significant for type 2 diabetes; chronic atrial fibrillation, on Eliquis for anticoagulation; status post CVA with left-sided weakness; hypothyroidism; dyslipidemia; hypertension; and dysphagia secondary to CVA, status post PEG tube placement, who was in his usual state of health until the day of his presentation at the emergency room when the patient developed vomiting. According to report, the patient woke up in the middle of the night and found to have low blood sugar. His spouse gave him some orange juice and the patient started vomiting. The vomiting was nonbilious. The patient had multiple episodes of vomiting. The following morning, the patient's temperature was reported as 99. He was taken to [facility] for further evaluation. When the patient arrived at this hospital, CT scan of the chest, abdomen and pelvis was performed. The CT of the chest shows evidence of pneumonia. There was a concern for aspiration pneumonia. The patient was going to be admitted to [facility] for further evaluation and treatment, but the patient left the hospital against medical advice because the wait was too long for him. He then came to [facility] emergency room for re-evaluation. When the patient arrived at the emergency room, the patient has tachycardia, tachypnea. There was a concern for sepsis. The sepsis protocol was initiated.   The patient received fluid therapy, broad-spectrum antibiotics, and was referred to the hospitalist service for evaluation for admission. No record of prior admission to this hospital.\"          Interval history / Subjective: Follow up nausea/vomiting. Patient seen and examined. Awake and alert  Hypoglycemic overnight, started d5       Assessment & Plan:     Sepsis (elevated HR, RR) secondary to Aspiration pneumonia, POA: resolved   -vomiting episode 4/11. CT chest with left perihilar and lower lung airspace disease  -c/w iv abx for now     Dysphagia, s/p PEG tube  -nutrition reconsult  -changed tube feeds with resolution of vomiting     Type I diabetes:   -DM consultation. lantus 12 units, SSI, lantus increased but caused hypoglycemia, decrease lantus to 15U HS     Atrial fibrillation:   -continue home eliquis, carvedilol and cardizem   - will resume   - s/p recent cardioversion, back in AFib  -scheduled to have Watchman's procedure with Pulido Cones in June 2022. History CVA, cardioembolic   History ICH  -has both right and left sided deficits, aphasia  -continue eliquis    Depression  - resume zoloft     Hypothyroidism: home levothyroxine  HLD: home statin  HTN: BB       Code status: full   Prophylaxis: eliquis  Care Plan discussed with: patient, wife, nurse  Anticipated Disposition: discharge in AM      Hospital Problems  Date Reviewed: 4/11/2022          Codes Class Noted POA    * (Principal) Sepsis ([facility] [address] ICD-10-CM: A41.9  ICD-9-CM: 038.9, 995.91  4/11/2022 Yes                Review of Systems:   Negative unless stated above    Vital Signs:    Last 24hrs VS reviewed since prior progress note.  Most recent are:  Visit Vitals  /77 (BP 1 Location: Right upper arm, BP Patient Position: Prone)   Pulse 98   Temp 97.8 °F (36.6 °C)   Resp 16   Ht 5' 11\" (1.803 m)   Wt 73 kg (160 lb 15 oz)   SpO2 98%   BMI 22.45 kg/m²         Intake/Output Summary (Last 24 hours) at 4/16/2022 1258  Last data filed at 4/15/2022 1734  Gross per 24 hour   Intake --   Output 950 ml   Net -950 ml        Physical Examination:     I had a face to face encounter with this patient and independently examined them on 4/16/2022 as outlined below:          Constitutional:  No acute distress, cooperative, pleasant    ENT:  +secretions   Resp:  Coarse bilaterally. CV:  irregularly irregular,     GI:  Soft, non distended, non tender. PEG tube in place, normoactive bowel sounds, no hepatosplenomegaly     Musculoskeletal:  No edema, warm, 2+ pulses throughout    Neurologic:  left sided hemiparesis, alert, asphasia             Data Review:    Review and/or order of clinical lab test  Review and/or order of tests in the radiology section of CPT  Review and/or order of tests in the medicine section of CPT      Labs:     Recent Labs     04/14/22  0354   WBC 7.7   HGB 9.4*   HCT 28.9*        Recent Labs     04/15/22  0251 04/14/22  0354    139   K 3.6 3.9   * 111*   CO2 22 20*   BUN 25* 29*   CREA 1.02 1.06   * 165*   CA 8.7 9.0   MG  --  1.9   PHOS  --  2.3*     No results for input(s): ALT, AP, TBIL, TBILI, TP, ALB, GLOB, GGT, AML, LPSE in the last 72 hours. No lab exists for component: SGOT, GPT, AMYP, HLPSE  No results for input(s): INR, PTP, APTT, INREXT, INREXT in the last 72 hours. No results for input(s): FE, TIBC, PSAT, FERR in the last 72 hours. No results found for: FOL, RBCF   No results for input(s): PH, PCO2, PO2 in the last 72 hours. No results for input(s): CPK, CKNDX, TROIQ in the last 72 hours.     No lab exists for component: CPKMB  No results found for: CHOL, CHOLX, CHLST, CHOLV, HDL, HDLP, LDL, LDLC, DLDLP, TGLX, TRIGL, TRIGP, CHHD, CHHDX  Lab Results   Component Value Date/Time    Glucose (POC) 88 04/16/2022 11:11 AM    Glucose (POC) 167 (H) 04/16/2022 07:03 AM    Glucose (POC) 58 (L) 04/16/2022 06:32 AM    Glucose (POC) 74 04/16/2022 05:35 AM    Glucose (POC) 92 04/16/2022 05:16 AM     No results found for: COLOR, APPRN, SPGRU, REFSG, MYLES, PROTU, GLUCU, [employer], BILU, UROU, CLAIRE, [address] [facility], [address] [place] [address], EPSU, BACTU, WBCU, RBCU, CASTS, UCRY      Medications Reviewed:     Current Facility-Administered Medications   Medication Dose Route Frequency    insulin glargine (LANTUS) injection 15 Units  15 Units SubCUTAneous QHS    [START ON 4/17/2022] vancomycin random level 4/17 with AM labs   Other ONCE    arformoteroL (BROVANA) neb solution 15 mcg  15 mcg Nebulization BID RT    budesonide (PULMICORT) 500 mcg/2 ml nebulizer suspension  500 mcg Nebulization BID RT    dantrolene (DANTRIUM) capsule 25 mg  25 mg Oral TID    dilTIAZem IR (CARDIZEM) tablet 60 mg  60 mg Oral BID    [START ON 4/17/2022] furosemide (LASIX) tablet 40 mg  40 mg Oral DAILY WITH BREAKFAST    [START ON 5/56/0817] folic acid (FOLVITE) tablet 1 mg  1 mg Oral DAILY    hydrocortisone (CORTEF) tablet 5 mg  5 mg Oral DAILY    midodrine (PROAMATINE) tablet 2.5 mg  2.5 mg Oral BID    sertraline (ZOLOFT) tablet 200 mg  200 mg Oral PCL    traZODone (DESYREL) tablet 100 mg  100 mg Oral QHS    [START ON 4/17/2022] potassium bicarb-citric acid (EFFER-K) tablet 20 mEq  20 mEq Oral DAILY    vancomycin (VANCOCIN) 1000 mg in  ml infusion  1,000 mg IntraVENous Q18H    HYDROcodone-acetaminophen (NORCO) 5-325 mg per tablet 1 Tablet  1 Tablet Oral Q6H PRN    hydrALAZINE (APRESOLINE) 20 mg/mL injection 10 mg  10 mg IntraVENous Q6H PRN    Vancomycin dosing per pharmacy   Other Rx Dosing/Monitoring    apixaban (ELIQUIS) tablet 5 mg  5 mg Oral Q12H    albuterol-ipratropium (DUO-NEB) 2.5 MG-0.5 MG/3 ML  3 mL Nebulization Q4H PRN    levothyroxine (SYNTHROID) tablet 50 mcg  50 mcg Oral 6am    atorvastatin (LIPITOR) tablet 40 mg  40 mg Oral DAILY    carvediloL (COREG) tablet 25 mg  25 mg Oral BID WITH MEALS    dextrose 10 % infusion 0-250 mL  0-250 mL IntraVENous PRN    sodium chloride (NS) flush 5-10 mL  5-10 mL IntraVENous PRN    sodium chloride (NS) flush 5-40 mL  5-40 mL IntraVENous Q8H    sodium chloride (NS) flush 5-40 mL  5-40 mL IntraVENous PRN    acetaminophen (TYLENOL) tablet 650 mg  650 mg Oral Q6H PRN    Or    acetaminophen (TYLENOL) suppository 650 mg  650 mg Rectal Q6H PRN    ondansetron (ZOFRAN ODT) tablet 4 mg  4 mg Oral Q8H PRN    Or    ondansetron (ZOFRAN) injection 4 mg  4 mg IntraVENous Q6H PRN    bisacodyL (DULCOLAX) suppository 10 mg  10 mg Rectal DAILY PRN    piperacillin-tazobactam (ZOSYN) 3.375 g in 0.9% sodium chloride (MBP/ADV) 100 mL MBP  3.375 g IntraVENous Q8H    insulin lispro (HUMALOG) injection   SubCUTAneous AC&HS    glucose chewable tablet 16 g  4 Tablet Oral PRN    glucagon (GLUCAGEN) injection 1 mg  1 mg IntraMUSCular PRN     ______________________________________________________________________  EXPECTED LENGTH OF STAY: 4d 19h  ACTUAL LENGTH OF STAY:          5                 Crystal Curtis MD

## 2022-04-16 NOTE — PROGRESS NOTES
Problem: Pressure Injury - Risk of  Goal: *Prevention of pressure injury  Description: Document Rajan Scale and appropriate interventions in the flowsheet. Outcome: Progressing Towards Goal  Note: Pressure Injury Interventions:  Sensory Interventions: Assess changes in LOC    Moisture Interventions: Absorbent underpads,Apply protective barrier, creams and emollients,Internal/External urinary devices,Maintain skin hydration (lotion/cream)    Activity Interventions: Increase time out of bed,Pressure redistribution bed/mattress(bed type)    Mobility Interventions: HOB 30 degrees or less,Pressure redistribution bed/mattress (bed type)    Nutrition Interventions: Document food/fluid/supplement intake    Friction and Shear Interventions: HOB 30 degrees or less                Problem: Patient Education: Go to Patient Education Activity  Goal: Patient/Family Education  Outcome: Progressing Towards Goal     Problem: Falls - Risk of  Goal: *Absence of Falls  Description: Document Ruth Fall Risk and appropriate interventions in the flowsheet.   Outcome: Progressing Towards Goal  Note: Fall Risk Interventions:  Mobility Interventions: Bed/chair exit alarm,Patient to call before getting OOB         Medication Interventions: Bed/chair exit alarm,Evaluate medications/consider consulting pharmacy,Patient to call before getting OOB,Assess postural VS orthostatic hypotension,Teach patient to arise slowly    Elimination Interventions: Call light in reach,Bed/chair exit alarm,Patient to call for help with toileting needs,Toileting schedule/hourly rounds

## 2022-04-16 NOTE — PROGRESS NOTES
Day #5 of Vancomycin  Indication:  pneumonia   Current regimen:  1000 mg IV Q18H  Abx regimen:  vancomycin + Zosyn  ID Following ?: NO  Frequency of BMP?: daily     Recent Labs     04/15/22  0251 22  0354   WBC  --  7.7   CREA 1.02 1.06   BUN 25* 29*     Est CrCl: 70-75 ml/min  Temp (24hrs), Av.9 °F (36.6 °C), Min:97.4 °F (36.3 °C), Max:98.6 °F (37 °C)    Cultures:    blood - Diphtheroids in 1/, GPC in clusters in 2nd bottle; prelim   MRSA screen - present     Goal target range AUC/SERENA 400-600    Recent level history:  Date/Time Dose & Interval Measured Level (mcg/mL) Associated AUC/SERENA Dose Adjustment    [email or web address] 750 mg q12h  17.1 563 1000 mg q18h                                           Plan: Continue current regimen. Predicted . Will order random level with AM labs on new regimen.

## 2022-04-17 VITALS
RESPIRATION RATE: 17 BRPM | WEIGHT: 160.05 LBS | HEART RATE: 55 BPM | OXYGEN SATURATION: 96 % | HEIGHT: 71 IN | SYSTOLIC BLOOD PRESSURE: 96 MMHG | TEMPERATURE: 97.6 F | BODY MASS INDEX: 22.41 KG/M2 | DIASTOLIC BLOOD PRESSURE: 52 MMHG

## 2022-04-17 LAB
ANION GAP SERPL CALC-SCNC: 8 MMOL/L (ref 5–15)
BUN SERPL-MCNC: 25 MG/DL (ref 6–20)
BUN/CREAT SERPL: 26 (ref 12–20)
CALCIUM SERPL-MCNC: 8.6 MG/DL (ref 8.5–10.1)
CHLORIDE SERPL-SCNC: 110 MMOL/L (ref 97–108)
CO2 SERPL-SCNC: 22 MMOL/L (ref 21–32)
CREAT SERPL-MCNC: 0.95 MG/DL (ref 0.7–1.3)
GLUCOSE BLD STRIP.AUTO-MCNC: 205 MG/DL (ref 65–117)
GLUCOSE BLD STRIP.AUTO-MCNC: 273 MG/DL (ref 65–117)
GLUCOSE SERPL-MCNC: 263 MG/DL (ref 65–100)
POTASSIUM SERPL-SCNC: 4 MMOL/L (ref 3.5–5.1)
SERVICE CMNT-IMP: ABNORMAL
SERVICE CMNT-IMP: ABNORMAL
SODIUM SERPL-SCNC: 140 MMOL/L (ref 136–145)
VANCOMYCIN SERPL-MCNC: 13.8 UG/ML

## 2022-04-17 PROCEDURE — 74011250637 HC RX REV CODE- 250/637: Performed by: HOSPITALIST

## 2022-04-17 PROCEDURE — 36415 COLL VENOUS BLD VENIPUNCTURE: CPT

## 2022-04-17 PROCEDURE — 94760 N-INVAS EAR/PLS OXIMETRY 1: CPT

## 2022-04-17 PROCEDURE — 74011000250 HC RX REV CODE- 250: Performed by: INTERNAL MEDICINE

## 2022-04-17 PROCEDURE — 74011636637 HC RX REV CODE- 636/637: Performed by: INTERNAL MEDICINE

## 2022-04-17 PROCEDURE — 74011000250 HC RX REV CODE- 250: Performed by: HOSPITALIST

## 2022-04-17 PROCEDURE — 74011250636 HC RX REV CODE- 250/636: Performed by: HOSPITALIST

## 2022-04-17 PROCEDURE — 80048 BASIC METABOLIC PNL TOTAL CA: CPT

## 2022-04-17 PROCEDURE — 82962 GLUCOSE BLOOD TEST: CPT

## 2022-04-17 PROCEDURE — 94640 AIRWAY INHALATION TREATMENT: CPT

## 2022-04-17 PROCEDURE — 80202 ASSAY OF VANCOMYCIN: CPT

## 2022-04-17 PROCEDURE — 74011250637 HC RX REV CODE- 250/637: Performed by: INTERNAL MEDICINE

## 2022-04-17 RX ORDER — INSULIN GLARGINE 100 [IU]/ML
13 INJECTION, SOLUTION SUBCUTANEOUS DAILY
COMMUNITY

## 2022-04-17 RX ORDER — AMOXICILLIN AND CLAVULANATE POTASSIUM 400; 57 MG/5ML; MG/5ML
875 POWDER, FOR SUSPENSION ORAL 2 TIMES DAILY
Qty: 109 ML | Refills: 0 | Status: SHIPPED
Start: 2022-04-17 | End: 2022-04-22

## 2022-04-17 RX ADMIN — DANTROLENE SODIUM 25 MG: 25 CAPSULE ORAL at 09:33

## 2022-04-17 RX ADMIN — MIDODRINE HYDROCHLORIDE 2.5 MG: 2.5 TABLET ORAL at 09:32

## 2022-04-17 RX ADMIN — DILTIAZEM HYDROCHLORIDE 60 MG: 60 TABLET, FILM COATED ORAL at 07:01

## 2022-04-17 RX ADMIN — SODIUM CHLORIDE, PRESERVATIVE FREE 10 ML: 5 INJECTION INTRAVENOUS at 06:00

## 2022-04-17 RX ADMIN — Medication 5 UNITS: at 07:02

## 2022-04-17 RX ADMIN — FOLIC ACID 1 MG: 1 TABLET ORAL at 09:32

## 2022-04-17 RX ADMIN — APIXABAN 5 MG: 5 TABLET, FILM COATED ORAL at 09:33

## 2022-04-17 RX ADMIN — POTASSIUM BICARBONATE 20 MEQ: 782 TABLET, EFFERVESCENT ORAL at 09:33

## 2022-04-17 RX ADMIN — FUROSEMIDE 40 MG: 40 TABLET ORAL at 07:01

## 2022-04-17 RX ADMIN — ATORVASTATIN CALCIUM 40 MG: 40 TABLET, FILM COATED ORAL at 09:33

## 2022-04-17 RX ADMIN — BUDESONIDE 500 MCG: 0.5 INHALANT RESPIRATORY (INHALATION) at 08:41

## 2022-04-17 RX ADMIN — HYDROCORTISONE 5 MG: 10 TABLET ORAL at 09:32

## 2022-04-17 RX ADMIN — LEVOTHYROXINE SODIUM 50 MCG: 0.05 TABLET ORAL at 07:01

## 2022-04-17 RX ADMIN — CARVEDILOL 25 MG: 12.5 TABLET, FILM COATED ORAL at 07:01

## 2022-04-17 RX ADMIN — VANCOMYCIN HYDROCHLORIDE 1000 MG: 10 INJECTION, POWDER, LYOPHILIZED, FOR SOLUTION INTRAVENOUS at 07:02

## 2022-04-17 RX ADMIN — ARFORMOTEROL TARTRATE 15 MCG: 15 SOLUTION RESPIRATORY (INHALATION) at 08:41

## 2022-04-17 NOTE — PROGRESS NOTES
Patient BS @ 2130 was 239. Obtained BS again at patient's request.  It was 140. Administered 2 units of lispro. Notified on-call physician and she indicated that it was okay to give 15 unit of Lantus and recheck at 0300. Patient refused Lantus. .  BS at [facility] 38. Plan to cover with 5 units lispro.

## 2022-04-17 NOTE — DISCHARGE INSTRUCTIONS
Please bring this form with you to show your primary care provider at your follow-up appointment. Primary care provider:  Dr. Nadiya Chan MD    Discharging provider:  Dajuan Layton MD    You have been admitted to the hospital with the following diagnoses:  · Sepsis ([address] [A41.9]\  · Aspiration Pneumonia  · Hyperglycemia/Hypoglycemia     FOLLOW-UP CARE RECOMMENDATIONS:    APPOINTMENTS:  Follow-up Information     Follow up With Specialties Details Why [address]    Nadiya Chan MD Family Medicine In 1 week [phone]              FOLLOW-UP TESTS recommended:   - monitor BG at home  - Complete Antibiotics for 5 more days as prescribed    PENDING TEST RESULTS:  At the time of your discharge the following test results are still pending: NONE  Please make sure you review these results with your outpatient follow-up provider(s). SYMPTOMS to watch for: chest pain, shortness of breath, fever, chills, nausea, vomiting, diarrhea, change in mentation, falling, weakness, bleeding. DIET/what to eat:  PEG feeding    ACTIVITY:  As tolerated     WOUND CARE: NONE      EQUIPMENT needed:  NONE      What to do if new or unexpected symptoms occur? If you experience any of the above symptoms (or should other concerns or questions arise after discharge) please call your primary care physician. Return to the emergency room if you cannot get hold of your doctor. · It is very important that you keep your follow-up appointment(s). · Please bring discharge papers, medication list (and/or medication bottles) to your follow-up appointments for review by your outpatient provider(s). · Please check the list of medications and be sure it includes every medication (even non-prescription medications) that your provider wants you to take.     · It is important that you take the medication exactly as they are prescribed. · Keep your medication in the bottles provided by the pharmacist and keep a list of the medication names, dosages, and times to be taken in your wallet. · Do not take other medications without consulting your doctor. · If you have any questions about your medications or other instructions, please talk to your nurse or care provider before you leave the hospital.    I understand that if any problems occur once I am at home I am to contact my physician. These instructions were explained to me and I had the opportunity to ask questions.

## 2022-04-17 NOTE — PROGRESS NOTES
Day #6 of Vancomycin  Indication:  pneumonia   Current regimen:  1000 mg IV Q18H  Abx regimen:  vancomycin + Zosyn  ID Following ?: NO  Frequency of BMP?: daily     Recent Labs     22  0456 04/15/22  0251   CREA 0.95 1.02   BUN 25* 25*     Est CrCl: 75-80 ml/min  Temp (24hrs), Av °F (36.7 °C), Min:97.8 °F (36.6 °C), Max:98.1 °F (36.7 °C)    Cultures:    blood - Diphtheroids in 12, Aerococcus urinae in 2nd bottle; prelim   MRSA screen - present     Goal target range AUC/SERENA 400-600    Recent level history:  Date/Time Dose & Interval Measured Level (mcg/mL) Associated AUC/SERENA Dose Adjustment    [email or web address] 750 mg q12h  17.1 563 1000 mg q18h    @ 0456 Vanc 1g q18h 13.8 (~13.5h post dose) 421 Continue same                                    Plan: Continue current regimen for calculated AUC within goal. Will reassess regimen with another level ~qweekly or sooner if clinically indicated.

## 2022-04-17 NOTE — PROGRESS NOTES
Transition of Care Plan   RUR- Low     DISPOSITION: OP Therapy services/ [facility] for Tube feeding, [phone]   F/U with PCP/Specialist     Transport: Wife will transport          [address]    Second IM completed over the phone with wife Sybil Archer [phone]    Mitra - on call liaison with [facility] stated that order was not received until  after business hours on Friday- [phone], they will deliver new feedings tomorrow, this CM confirmed order with [facility] weekend liaison. Charge nurse for [facility] 66 will make sure patient is sent home with feedings until Monday.     Shannon Lester  8:56 AM    --

## 2022-04-17 NOTE — DISCHARGE SUMMARY
Discharge Summary     Patient:  Jun Joyec       MRN: [id number]       YOB: 1955       Age: 77 y.o. Date of admission:  4/11/2022    Date of discharge:  4/17/2022    Primary care provider: Dr. Edinson Keane MD    Admitting provider:  Irma Sneed MD    Discharging provider:  Jenn Lozada MD - [phone]  If unavailable, call [phone] and ask the  to page the triage hospitalist.    Consultations  · IP CONSULT TO GASTROENTEROLOGY    Procedures  · * No surgery found *    Discharge destination: HOME with outpatient PT/OT. The patient is stable for discharge. Admission diagnosis  · [facility] [A41.9]   · Aspiration Pneumonia     Current Discharge Medication List      START taking these medications    Details   amoxicillin-clavulanate (AUGMENTIN) 400-57 mg/5 mL suspension Take 10.9 mL by mouth two (2) times a day for 5 days. Qty: 109 mL, Refills: 0  Start date: 4/17/2022, End date: 4/22/2022      doxycycline calcium 50 mg/5 mL syrp oral syrup 10 mL by PEG Tube route two (2) times a day for 5 days. Qty: 100 mL, Refills: 0  Start date: 4/17/2022, End date: 4/22/2022         CONTINUE these medications which have NOT CHANGED    Details   insulin glargine (Lantus Solostar U-100 Insulin) 100 unit/mL (3 mL) inpn 12 Units by SubCUTAneous route nightly. atorvastatin (LIPITOR) 40 mg tablet Take 40 mg by mouth daily (after dinner). furosemide (Lasix) 40 mg tablet Take 40 mg by mouth daily (with breakfast). sertraline (ZOLOFT) 100 mg tablet Take 100 mg by mouth daily (after lunch).      hydrocortisone (CORTEF) 5 mg tablet Take 5 mg by mouth daily. potassium chloride (KLOR-CON) 20 mEq pack Take 20 mEq by mouth daily (after lunch). dantrolene (DANTRIUM) 25 mg capsule Take 25 mg by mouth three (3) times daily.       dilTIAZem IR (CARDIZEM) 60 mg tablet Take 60 mg by mouth two (2) times a day. midodrine (PROAMATINE) 2.5 mg tablet Take 2.5 mg by mouth two (2) times a day. carvediloL (COREG) 25 mg tablet Take 25 mg by mouth two (2) times a day. traZODone (DESYREL) 50 mg tablet Take 100 mg by mouth nightly. apixaban (Eliquis) 5 mg tablet Take 5 mg by mouth two (2) times a day. budesonide (PULMICORT) 0.5 mg/2 mL nbsp 500 mcg by Nebulization route daily. arformoteroL (BROVANA) 15 mcg/2 mL nebu neb solution 15 mcg by Nebulization route daily. levothyroxine (SYNTHROID) 75 mcg tablet Take  by mouth daily (before breakfast). folic acid (FOLVITE) 1 mg tablet Take 1 mg by mouth daily. Follow-up Information     Follow up With Specialties Details Why Contact Info    [facility] [phone]    Sylvia Springer MD Family Medicine In 1 week Discharge follow up [place] [phone]            Final discharge diagnoses and brief hospital course  Please also refer to the admission H&P for details on the presenting problem. 76 yo male with PMHx of DM, Chronic AFib s/p ablation,on anticoagulation, CVA with left side hemiparesis, Hypothyroidism, HTN, Dysphagia 2/2 CVA s/p PEG admitted for vomiting and low BG. PT initially presented to [address] [place] [address] where he was diagnosed with Pneumonia. Pt was to be admitted but left AMA due to long wait and presented to [facility] for same complaints. Sepsis (elevated HR, RR) secondary to Aspiration pneumonia, POA: resolved   -vomiting episode 4/11. CT chest with left perihilar and lower lung airspace disease  -was placed on Zosyn on admission  - no fever, wbc normal   - change to doxy and Augmentin on discharge to complete 10 days total      Dysphagia, s/p PEG tube  -nutrition reconsult  -changed tube feeds with resolution of vomiting   - resolved vomiting.      Type I diabetes:   -DM consultation.  lantus 12 units, SSI, lantus increased but caused hypoglycemia, decrease lantus to 15U HS   -patient refused lantus 4/16, BG stable   - will ask to resume home dose of 12U to increase to 15U for BG persistent > 200     Atrial fibrillation:   -continue home eliquis, carvedilol and cardizem   - s/p recent cardioversion, back in AFib  -scheduled to have Watchman's procedure with Tad Click in June 2022.     History CVA, cardioembolic   History ICH  -has both right and left sided deficits, aphasia  -continue eliquis/Statin     Depression  - resume zoloft and Trazodone      Hypothyroidism: home levothyroxine    HLD: home statin      FOLLOW-UP TESTS recommended:   - monitor BG at home  - Complete Antibiotics for 5 more days as prescribed     PENDING TEST RESULTS:  At the time of your discharge the following test results are still pending: NONE  Please make sure you review these results with your outpatient follow-up provider(s).     SYMPTOMS to watch for: chest pain, shortness of breath, fever, chills, nausea, vomiting, diarrhea, change in mentation, falling, weakness, bleeding.     DIET/what to eat:  PEG feeding     ACTIVITY:  As tolerated      WOUND CARE: NONE        EQUIPMENT needed:  NONE    Physical examination at discharge  Visit Vitals  /68 (BP 1 Location: Left lower arm, BP Patient Position: At rest)   Pulse 83   Temp 98 °F (36.7 °C)   Resp 16   Ht 5' 11\" (1.803 m)   Wt 72.6 kg (160 lb 0.9 oz)   SpO2 96%   BMI 22.32 kg/m²     Alert and awake  Lung : good air entry  CVS: Irregular  Abd: soft NT ,PEG in place  Ext: no edema, Left foot TMA, Contracture in LUE      Pertinent imaging studies:        No results for input(s): WBC, HGB, HCT, PLT, HGBEXT, HCTEXT, PLTEXT in the last 72 hours. Recent Labs     04/17/22  0456 04/15/22  0251    140   K 4.0 3.6   * 111*   CO2 22 22   BUN 25* 25*   CREA 0.95 1.02   * 210*   CA 8.6 8.7     No results for input(s): AP, TBIL, TP, ALB, GLOB, GGT, AML, LPSE in the last 72 hours.     No lab exists for component: SGOT, GPT, AMYP, HLPSE  No results for input(s): INR, PTP, APTT, INREXT in the last 72 hours. No results for input(s): FE, TIBC, PSAT, FERR in the last 72 hours. No results for input(s): PH, PCO2, PO2 in the last 72 hours. No results for input(s): CPK, CKMB in the last 72 hours. No lab exists for component: TROPONINI  No components found for: [place]    Chronic Diagnoses:    Problem List as of 4/17/2022 Date Reviewed: 4/11/2022          Codes Class Noted - Resolved    * (Principal) Sepsis ([facility] ICD-10-CM: A41.9  ICD-9-CM: 038.9, 995.91  4/11/2022 - Present        Atrial flutter ([facility] ICD-10-CM: I48.92  ICD-9-CM: 427.32  12/28/2010 - Present        Hypertension ICD-10-CM: I10  ICD-9-CM: 401.9  12/28/2010 - Present        DM (diabetes mellitus) ([facility] ICD-10-CM: E11.9  ICD-9-CM: 250.00  12/28/2010 - Present              Time spent on discharge related activities today greater than 30 minutes.       Signed:  Isaac Vazquez MD                 Hospitalist, Internal Medicine      Cc: Ja Sen MD

## 2022-04-18 LAB
BACTERIA SPEC CULT: ABNORMAL
SERVICE CMNT-IMP: ABNORMAL

## 2022-04-21 LAB
BACTERIA ISLT: ABNORMAL
OTHER ANTIBIOTIC SUSC ISLT: ABNORMAL
OTHER ANTIBIOTIC SUSC ISLT: ABNORMAL
SOURCE, RSRC70: ABNORMAL
SPECIMEN SOURCE: ABNORMAL

## 2022-05-27 ENCOUNTER — HOSPITAL ENCOUNTER (INPATIENT)
Age: 67
LOS: 2 days | Discharge: HOME | DRG: 178 | End: 2022-05-29
Attending: EMERGENCY MEDICINE | Admitting: INTERNAL MEDICINE
Payer: MEDICARE

## 2022-05-27 ENCOUNTER — APPOINTMENT (OUTPATIENT)
Dept: GENERAL RADIOLOGY | Age: 67
DRG: 178 | End: 2022-05-27
Attending: EMERGENCY MEDICINE
Payer: MEDICARE

## 2022-05-27 DIAGNOSIS — R50.9 FEVER, UNSPECIFIED FEVER CAUSE: ICD-10-CM

## 2022-05-27 DIAGNOSIS — R05.9 COUGH: ICD-10-CM

## 2022-05-27 DIAGNOSIS — J69.0 ASPIRATION PNEUMONIA, UNSPECIFIED ASPIRATION PNEUMONIA TYPE, UNSPECIFIED LATERALITY, UNSPECIFIED PART OF LUNG (HCC): Primary | ICD-10-CM

## 2022-05-27 PROBLEM — R79.89 ELEVATED BRAIN NATRIURETIC PEPTIDE (BNP) LEVEL: Status: ACTIVE | Noted: 2022-05-27

## 2022-05-27 PROBLEM — N17.9 ARF (ACUTE RENAL FAILURE) (HCC): Status: ACTIVE | Noted: 2022-05-27

## 2022-05-27 PROBLEM — R11.2 NAUSEA & VOMITING: Status: ACTIVE | Noted: 2022-05-27

## 2022-05-27 PROBLEM — E86.0 DEHYDRATION: Status: ACTIVE | Noted: 2022-05-27

## 2022-05-27 PROBLEM — A41.9 SEPSIS (HCC): Status: RESOLVED | Noted: 2022-04-11 | Resolved: 2022-05-27

## 2022-05-27 PROBLEM — D72.829 LEUKOCYTOSIS: Status: ACTIVE | Noted: 2022-05-27

## 2022-05-27 LAB
ALBUMIN SERPL-MCNC: 3.1 G/DL (ref 3.5–5)
ALBUMIN/GLOB SERPL: 0.7 {RATIO} (ref 1.1–2.2)
ALP SERPL-CCNC: 150 U/L (ref 45–117)
ALT SERPL-CCNC: 43 U/L (ref 12–78)
ANION GAP SERPL CALC-SCNC: 8 MMOL/L (ref 5–15)
APPEARANCE UR: CLEAR
AST SERPL-CCNC: 23 U/L (ref 15–37)
BACTERIA URNS QL MICRO: NEGATIVE /HPF
BASOPHILS # BLD: 0.1 K/UL (ref 0–0.1)
BASOPHILS NFR BLD: 0 % (ref 0–1)
BILIRUB SERPL-MCNC: 0.5 MG/DL (ref 0.2–1)
BILIRUB UR QL: NEGATIVE
BNP SERPL-MCNC: ABNORMAL PG/ML (ref 0–125)
BUN SERPL-MCNC: 59 MG/DL (ref 6–20)
BUN/CREAT SERPL: 38 (ref 12–20)
CALCIUM SERPL-MCNC: 9.4 MG/DL (ref 8.5–10.1)
CHLORIDE SERPL-SCNC: 99 MMOL/L (ref 97–108)
CHOLEST SERPL-MCNC: 96 MG/DL
CO2 SERPL-SCNC: 26 MMOL/L (ref 21–32)
COLOR UR: ABNORMAL
COMMENT, HOLDF: NORMAL
COVID-19 RAPID TEST, COVR: NOT DETECTED
CREAT SERPL-MCNC: 1.54 MG/DL (ref 0.7–1.3)
DIFFERENTIAL METHOD BLD: ABNORMAL
EOSINOPHIL # BLD: 0 K/UL (ref 0–0.4)
EOSINOPHIL NFR BLD: 0 % (ref 0–7)
EPITH CASTS URNS QL MICRO: ABNORMAL /LPF
ERYTHROCYTE [DISTWIDTH] IN BLOOD BY AUTOMATED COUNT: 15.4 % (ref 11.5–14.5)
EST. AVERAGE GLUCOSE BLD GHB EST-MCNC: 183 MG/DL
FERRITIN SERPL-MCNC: 242 NG/ML (ref 26–388)
FOLATE SERPL-MCNC: 83.5 NG/ML (ref 5–21)
GLOBULIN SER CALC-MCNC: 4.7 G/DL (ref 2–4)
GLUCOSE BLD STRIP.AUTO-MCNC: 144 MG/DL (ref 65–117)
GLUCOSE SERPL-MCNC: 201 MG/DL (ref 65–100)
GLUCOSE UR STRIP.AUTO-MCNC: NEGATIVE MG/DL
HBA1C MFR BLD: 8 % (ref 4–5.6)
HCT VFR BLD AUTO: 31.9 % (ref 36.6–50.3)
HDLC SERPL-MCNC: 51 MG/DL
HDLC SERPL: 1.9 {RATIO} (ref 0–5)
HGB BLD-MCNC: 10.4 G/DL (ref 12.1–17)
HGB UR QL STRIP: ABNORMAL
IMM GRANULOCYTES # BLD AUTO: 0.2 K/UL (ref 0–0.04)
IMM GRANULOCYTES NFR BLD AUTO: 1 % (ref 0–0.5)
KETONES UR QL STRIP.AUTO: NEGATIVE MG/DL
LACTATE BLD-SCNC: 1.14 MMOL/L (ref 0.4–2)
LDH SERPL L TO P-CCNC: 221 U/L (ref 85–241)
LDLC SERPL CALC-MCNC: 31.2 MG/DL (ref 0–100)
LEUKOCYTE ESTERASE UR QL STRIP.AUTO: ABNORMAL
LYMPHOCYTES # BLD: 1.7 K/UL (ref 0.8–3.5)
LYMPHOCYTES NFR BLD: 9 % (ref 12–49)
MAGNESIUM SERPL-MCNC: 2.3 MG/DL (ref 1.6–2.4)
MCH RBC QN AUTO: 28.3 PG (ref 26–34)
MCHC RBC AUTO-ENTMCNC: 32.6 G/DL (ref 30–36.5)
MCV RBC AUTO: 86.9 FL (ref 80–99)
MONOCYTES # BLD: 1 K/UL (ref 0–1)
MONOCYTES NFR BLD: 5 % (ref 5–13)
NEUTS SEG # BLD: 16.7 K/UL (ref 1.8–8)
NEUTS SEG NFR BLD: 85 % (ref 32–75)
NITRITE UR QL STRIP.AUTO: NEGATIVE
NRBC # BLD: 0 K/UL (ref 0–0.01)
NRBC BLD-RTO: 0 PER 100 WBC
PH UR STRIP: 5.5 [PH] (ref 5–8)
PLATELET # BLD AUTO: 175 K/UL (ref 150–400)
PMV BLD AUTO: 12.7 FL (ref 8.9–12.9)
POTASSIUM SERPL-SCNC: 4.3 MMOL/L (ref 3.5–5.1)
PROCALCITONIN SERPL-MCNC: 0.43 NG/ML
PROT SERPL-MCNC: 7.8 G/DL (ref 6.4–8.2)
PROT UR STRIP-MCNC: 100 MG/DL
RBC # BLD AUTO: 3.67 M/UL (ref 4.1–5.7)
RBC #/AREA URNS HPF: ABNORMAL /HPF (ref 0–5)
RETICS # AUTO: 0.06 M/UL (ref 0.03–0.1)
RETICS/RBC NFR AUTO: 1.6 % (ref 0.7–2.1)
SAMPLES BEING HELD,HOLD: NORMAL
SERVICE CMNT-IMP: ABNORMAL
SODIUM SERPL-SCNC: 133 MMOL/L (ref 136–145)
SOURCE, COVRS: NORMAL
SP GR UR REFRACTOMETRY: 1.02 (ref 1–1.03)
TRIGL SERPL-MCNC: 69 MG/DL (ref ?–150)
TSH SERPL DL<=0.05 MIU/L-ACNC: 2.36 UIU/ML (ref 0.36–3.74)
UROBILINOGEN UR QL STRIP.AUTO: 0.2 EU/DL (ref 0.2–1)
VIT B12 SERPL-MCNC: 1478 PG/ML (ref 193–986)
VLDLC SERPL CALC-MCNC: 13.8 MG/DL
WBC # BLD AUTO: 19.6 K/UL (ref 4.1–11.1)
WBC URNS QL MICRO: ABNORMAL /HPF (ref 0–4)

## 2022-05-27 PROCEDURE — 83735 ASSAY OF MAGNESIUM: CPT

## 2022-05-27 PROCEDURE — 65270000029 HC RM PRIVATE

## 2022-05-27 PROCEDURE — 74011636637 HC RX REV CODE- 636/637: Performed by: INTERNAL MEDICINE

## 2022-05-27 PROCEDURE — 83036 HEMOGLOBIN GLYCOSYLATED A1C: CPT

## 2022-05-27 PROCEDURE — 82607 VITAMIN B-12: CPT

## 2022-05-27 PROCEDURE — 84145 PROCALCITONIN (PCT): CPT

## 2022-05-27 PROCEDURE — 82728 ASSAY OF FERRITIN: CPT

## 2022-05-27 PROCEDURE — 77010033678 HC OXYGEN DAILY

## 2022-05-27 PROCEDURE — 74011250636 HC RX REV CODE- 250/636: Performed by: INTERNAL MEDICINE

## 2022-05-27 PROCEDURE — 94761 N-INVAS EAR/PLS OXIMETRY MLT: CPT

## 2022-05-27 PROCEDURE — 99285 EMERGENCY DEPT VISIT HI MDM: CPT

## 2022-05-27 PROCEDURE — 80053 COMPREHEN METABOLIC PANEL: CPT

## 2022-05-27 PROCEDURE — 71045 X-RAY EXAM CHEST 1 VIEW: CPT

## 2022-05-27 PROCEDURE — 87635 SARS-COV-2 COVID-19 AMP PRB: CPT

## 2022-05-27 PROCEDURE — 83615 LACTATE (LD) (LDH) ENZYME: CPT

## 2022-05-27 PROCEDURE — 83880 ASSAY OF NATRIURETIC PEPTIDE: CPT

## 2022-05-27 PROCEDURE — 2709999900 HC NON-CHARGEABLE SUPPLY

## 2022-05-27 PROCEDURE — 82962 GLUCOSE BLOOD TEST: CPT

## 2022-05-27 PROCEDURE — 74011250637 HC RX REV CODE- 250/637: Performed by: INTERNAL MEDICINE

## 2022-05-27 PROCEDURE — 94640 AIRWAY INHALATION TREATMENT: CPT

## 2022-05-27 PROCEDURE — 85025 COMPLETE CBC W/AUTO DIFF WBC: CPT

## 2022-05-27 PROCEDURE — 81001 URINALYSIS AUTO W/SCOPE: CPT

## 2022-05-27 PROCEDURE — 36415 COLL VENOUS BLD VENIPUNCTURE: CPT

## 2022-05-27 PROCEDURE — 83540 ASSAY OF IRON: CPT

## 2022-05-27 PROCEDURE — 84443 ASSAY THYROID STIM HORMONE: CPT

## 2022-05-27 PROCEDURE — 82746 ASSAY OF FOLIC ACID SERUM: CPT

## 2022-05-27 PROCEDURE — 74011250636 HC RX REV CODE- 250/636: Performed by: EMERGENCY MEDICINE

## 2022-05-27 PROCEDURE — 74011000250 HC RX REV CODE- 250: Performed by: INTERNAL MEDICINE

## 2022-05-27 PROCEDURE — 74011000258 HC RX REV CODE- 258: Performed by: EMERGENCY MEDICINE

## 2022-05-27 PROCEDURE — 80307 DRUG TEST PRSMV CHEM ANLYZR: CPT

## 2022-05-27 PROCEDURE — 80061 LIPID PANEL: CPT

## 2022-05-27 PROCEDURE — 87040 BLOOD CULTURE FOR BACTERIA: CPT

## 2022-05-27 PROCEDURE — 85045 AUTOMATED RETICULOCYTE COUNT: CPT

## 2022-05-27 PROCEDURE — 83605 ASSAY OF LACTIC ACID: CPT

## 2022-05-27 PROCEDURE — 83010 ASSAY OF HAPTOGLOBIN QUANT: CPT

## 2022-05-27 RX ORDER — ACETAMINOPHEN 325 MG/1
650 TABLET ORAL
Status: DISCONTINUED | OUTPATIENT
Start: 2022-05-27 | End: 2022-05-29 | Stop reason: HOSPADM

## 2022-05-27 RX ORDER — FOLIC ACID 1 MG/1
1 TABLET ORAL DAILY
Status: DISCONTINUED | OUTPATIENT
Start: 2022-05-28 | End: 2022-05-29 | Stop reason: HOSPADM

## 2022-05-27 RX ORDER — INSULIN GLARGINE 100 [IU]/ML
12 INJECTION, SOLUTION SUBCUTANEOUS DAILY
Status: DISCONTINUED | OUTPATIENT
Start: 2022-05-27 | End: 2022-05-29 | Stop reason: HOSPADM

## 2022-05-27 RX ORDER — ONDANSETRON 4 MG/1
4 TABLET, FILM COATED ORAL AS NEEDED
COMMUNITY

## 2022-05-27 RX ORDER — DEXTROSE MONOHYDRATE 100 MG/ML
0-250 INJECTION, SOLUTION INTRAVENOUS AS NEEDED
Status: DISCONTINUED | OUTPATIENT
Start: 2022-05-27 | End: 2022-05-29 | Stop reason: HOSPADM

## 2022-05-27 RX ORDER — ALBUTEROL SULFATE 2.5 MG/.5ML
2.5 SOLUTION RESPIRATORY (INHALATION) 2 TIMES DAILY
COMMUNITY

## 2022-05-27 RX ORDER — SERTRALINE HYDROCHLORIDE 50 MG/1
200 TABLET, FILM COATED ORAL
Status: DISCONTINUED | OUTPATIENT
Start: 2022-05-27 | End: 2022-05-29 | Stop reason: HOSPADM

## 2022-05-27 RX ORDER — SODIUM CHLORIDE 9 MG/ML
75 INJECTION, SOLUTION INTRAVENOUS CONTINUOUS
Status: DISCONTINUED | OUTPATIENT
Start: 2022-05-27 | End: 2022-05-29 | Stop reason: HOSPADM

## 2022-05-27 RX ORDER — ATORVASTATIN CALCIUM 20 MG/1
40 TABLET, FILM COATED ORAL
Status: DISCONTINUED | OUTPATIENT
Start: 2022-05-27 | End: 2022-05-29 | Stop reason: HOSPADM

## 2022-05-27 RX ORDER — POLYETHYLENE GLYCOL 3350 17 G/17G
17 POWDER, FOR SOLUTION ORAL DAILY PRN
Status: DISCONTINUED | OUTPATIENT
Start: 2022-05-27 | End: 2022-05-28

## 2022-05-27 RX ORDER — MIDODRINE HYDROCHLORIDE 5 MG/1
2.5 TABLET ORAL 2 TIMES DAILY
Status: DISCONTINUED | OUTPATIENT
Start: 2022-05-27 | End: 2022-05-29 | Stop reason: HOSPADM

## 2022-05-27 RX ORDER — ONDANSETRON 2 MG/ML
4 INJECTION INTRAMUSCULAR; INTRAVENOUS
Status: DISCONTINUED | OUTPATIENT
Start: 2022-05-27 | End: 2022-05-29 | Stop reason: HOSPADM

## 2022-05-27 RX ORDER — SERTRALINE HYDROCHLORIDE 50 MG/1
100 TABLET, FILM COATED ORAL
Status: DISCONTINUED | OUTPATIENT
Start: 2022-05-27 | End: 2022-05-27

## 2022-05-27 RX ORDER — HYDROCORTISONE 10 MG/1
5 TABLET ORAL DAILY
Status: DISCONTINUED | OUTPATIENT
Start: 2022-05-28 | End: 2022-05-29 | Stop reason: HOSPADM

## 2022-05-27 RX ORDER — LEVOTHYROXINE SODIUM 50 UG/1
50 TABLET ORAL
Status: DISCONTINUED | OUTPATIENT
Start: 2022-05-28 | End: 2022-05-29 | Stop reason: HOSPADM

## 2022-05-27 RX ORDER — VANCOMYCIN 1.75 GRAM/500 ML IN 0.9 % SODIUM CHLORIDE INTRAVENOUS
1750 ONCE
Status: DISCONTINUED | OUTPATIENT
Start: 2022-05-27 | End: 2022-05-27 | Stop reason: CLARIF

## 2022-05-27 RX ORDER — ONDANSETRON 4 MG/1
4 TABLET, ORALLY DISINTEGRATING ORAL
Status: DISCONTINUED | OUTPATIENT
Start: 2022-05-27 | End: 2022-05-29 | Stop reason: HOSPADM

## 2022-05-27 RX ORDER — ACETAMINOPHEN 650 MG/1
650 SUPPOSITORY RECTAL
Status: DISCONTINUED | OUTPATIENT
Start: 2022-05-27 | End: 2022-05-29 | Stop reason: HOSPADM

## 2022-05-27 RX ORDER — BUDESONIDE 0.5 MG/2ML
500 INHALANT ORAL
Status: DISCONTINUED | OUTPATIENT
Start: 2022-05-27 | End: 2022-05-29 | Stop reason: HOSPADM

## 2022-05-27 RX ORDER — CARVEDILOL 12.5 MG/1
25 TABLET ORAL 2 TIMES DAILY
Status: DISCONTINUED | OUTPATIENT
Start: 2022-05-27 | End: 2022-05-29 | Stop reason: HOSPADM

## 2022-05-27 RX ORDER — HYDROCODONE BITARTRATE AND ACETAMINOPHEN 5; 325 MG/1; MG/1
1 TABLET ORAL
COMMUNITY

## 2022-05-27 RX ORDER — MAGNESIUM SULFATE 100 %
4 CRYSTALS MISCELLANEOUS AS NEEDED
Status: DISCONTINUED | OUTPATIENT
Start: 2022-05-27 | End: 2022-05-29 | Stop reason: HOSPADM

## 2022-05-27 RX ORDER — ARFORMOTEROL TARTRATE 15 UG/2ML
15 SOLUTION RESPIRATORY (INHALATION) DAILY
Status: DISCONTINUED | OUTPATIENT
Start: 2022-05-28 | End: 2022-05-29 | Stop reason: HOSPADM

## 2022-05-27 RX ORDER — DANTROLENE SODIUM 25 MG/1
25 CAPSULE ORAL 3 TIMES DAILY
Status: DISCONTINUED | OUTPATIENT
Start: 2022-05-27 | End: 2022-05-29 | Stop reason: HOSPADM

## 2022-05-27 RX ORDER — INSULIN LISPRO 100 [IU]/ML
INJECTION, SOLUTION INTRAVENOUS; SUBCUTANEOUS
Status: DISCONTINUED | OUTPATIENT
Start: 2022-05-27 | End: 2022-05-29 | Stop reason: HOSPADM

## 2022-05-27 RX ORDER — DILTIAZEM HYDROCHLORIDE 30 MG/1
60 TABLET, FILM COATED ORAL 2 TIMES DAILY WITH MEALS
Status: DISCONTINUED | OUTPATIENT
Start: 2022-05-27 | End: 2022-05-29 | Stop reason: HOSPADM

## 2022-05-27 RX ORDER — CARVEDILOL 6.25 MG/1
25 TABLET ORAL 2 TIMES DAILY WITH MEALS
Status: DISCONTINUED | OUTPATIENT
Start: 2022-05-27 | End: 2022-05-27

## 2022-05-27 RX ORDER — TRAZODONE HYDROCHLORIDE 100 MG/1
100 TABLET ORAL
Status: DISCONTINUED | OUTPATIENT
Start: 2022-05-27 | End: 2022-05-29 | Stop reason: HOSPADM

## 2022-05-27 RX ADMIN — ATORVASTATIN CALCIUM 40 MG: 20 TABLET, FILM COATED ORAL at 18:56

## 2022-05-27 RX ADMIN — SODIUM CHLORIDE 75 ML/HR: 9 INJECTION, SOLUTION INTRAVENOUS at 22:02

## 2022-05-27 RX ADMIN — SERTRALINE 200 MG: 50 TABLET, FILM COATED ORAL at 18:56

## 2022-05-27 RX ADMIN — TRAZODONE HYDROCHLORIDE 100 MG: 100 TABLET ORAL at 21:57

## 2022-05-27 RX ADMIN — ONDANSETRON 4 MG: 2 INJECTION INTRAMUSCULAR; INTRAVENOUS at 15:13

## 2022-05-27 RX ADMIN — PIPERACILLIN AND TAZOBACTAM 4.5 G: 4; .5 INJECTION, POWDER, LYOPHILIZED, FOR SOLUTION INTRAVENOUS at 13:10

## 2022-05-27 RX ADMIN — DILTIAZEM HYDROCHLORIDE 60 MG: 30 TABLET, FILM COATED ORAL at 18:56

## 2022-05-27 RX ADMIN — APIXABAN 5 MG: 5 TABLET, FILM COATED ORAL at 21:56

## 2022-05-27 RX ADMIN — SODIUM CHLORIDE 75 ML/HR: 9 INJECTION, SOLUTION INTRAVENOUS at 13:57

## 2022-05-27 RX ADMIN — DANTROLENE SODIUM 25 MG: 25 CAPSULE ORAL at 22:48

## 2022-05-27 RX ADMIN — BUDESONIDE 500 MCG: 0.5 SUSPENSION RESPIRATORY (INHALATION) at 20:54

## 2022-05-27 RX ADMIN — CARVEDILOL 25 MG: 12.5 TABLET, FILM COATED ORAL at 21:56

## 2022-05-27 RX ADMIN — Medication 3 UNITS: at 16:30

## 2022-05-27 NOTE — Clinical Note
Status[de-identified] INPATIENT [101]   Type of Bed: Medical [8]   Cardiac Monitoring Required?: No   Inpatient Hospitalization Certified Necessary for the Following Reasons: 3.  Patient receiving treatment that can only be provided in an inpatient setting (further clarification in H&P documentation)   Admitting Diagnosis: Aspiration pneumonia [facility]) [029026]   Admitting Physician: Brianna Springer [[id number]]   Attending Physician: Brianna Springer [[id number]]   Estimated Length of Stay: 2 Midnights   Discharge Plan[de-identified] Home with Office Follow-up

## 2022-05-27 NOTE — ED PROVIDER NOTES
HPI   89-BKNH-DCE man with a complicated past medical history including atrial fibrillation, diabetes, hypertension, ischemic and hemorrhagic strokes with bilateral extremity weakness, aphasia, and PEG tube dependence who presents to the emergency department due to cough and fever. Patient developed a cough yesterday. His wife states he has been coughing up whitish sputum which is not uncommon for him. His cough is been more frequent than a chronic cough he has. He also had 3 episodes of vomiting yesterday that was nonbloody and nonbilious. Patient denies any abdominal pain and says he is not currently nauseous. When his cough started yesterday his wife noted him to have a fever. [facility] and patient was reported to have clear lungs. He was given a prescription for cefdinir which is worked for him in the past.  However he does continue to have a low-grade fever. His wife states he has not been his normal self and has been fatigued, but has also had a difficult sleeping. Patient's wife says his symptoms are consistent with prior episodes of aspiration pneumonia. Past Medical History:   Diagnosis Date    Atrial fibrillation ([facility]     Diabetes ([facility]     Hypertension     Rheumatoid arthritis ([facility]     [facility]     hemorrhagic        Past Surgical History:   Procedure Laterality Date    HX AMPUTATION Left     left 5 toes    HX HERNIA REPAIR           No family history on file.     Social History     Socioeconomic History    Marital status:      Spouse name: Not on file    Number of children: Not on file    Years of education: Not on file    Highest education level: Not on file   Occupational History    Not on file   Tobacco Use    Smoking status: Not on file    Smokeless tobacco: Not on file   Substance and Sexual Activity    Alcohol use: Not on file    Drug use: Not on file    Sexual activity: Not on file   Other Topics Concern    Not on file   Social History Narrative    Not on file     Social Determinants of Health     Financial Resource Strain:     Difficulty of Paying Living Expenses: Not on file   Food Insecurity:     Worried About Running Out of Food in the Last Year: Not on file    Nickolas of Food in the Last Year: Not on file   Transportation Needs:     Lack of Transportation (Medical): Not on file    Lack of Transportation (Non-Medical): Not on file   Physical Activity:     Days of Exercise per Week: Not on file    Minutes of Exercise per Session: Not on file   Stress:     Feeling of Stress : Not on file   Social Connections:     Frequency of Communication with Friends and Family: Not on file    Frequency of Social Gatherings with Friends and Family: Not on file    Attends [employer] Services: Not on file    Active Member of [address] [place] [address] [employer] or Organizations: Not on file    Attends Club or Organization Meetings: Not on file    Marital Status: Not on file   Intimate Partner Violence:     Fear of Current or Ex-Partner: Not on file    Emotionally Abused: Not on file    Physically Abused: Not on file    Sexually Abused: Not on file   Housing Stability:     Unable to Pay for Housing in the Last Year: Not on file    Number of [place] in the Last Year: Not on file    Unstable Housing in the Last Year: Not on file         ALLERGIES: Patient has no known allergies. Review of Systems   Unable to perform a complete review of systems secondary to the patient's baseline aphasia neurologic deficits. All systems reviewed are otherwise negative unless documented in the HPI. There were no vitals filed for this visit. Physical Exam  Constitutional:       Comments: Chronically ill-appearing but not acutely distressed   HENT:      Mouth/Throat:      Comments: Moist mucous membranes. Patient has thrush on the tongue  Eyes:      General: No scleral icterus. Extraocular Movements: Extraocular movements intact. Neck:      Comments: Trachea midline. No JVD  Cardiovascular:      Comments: Regular rate and rhythm. No murmurs. 2+ radial pulses bilaterally. Pulmonary:      Comments: No respiratory distress noted. Rales at the bilateral bases. No wheezing  Abdominal:      Comments: Soft, nontender, nondistended. PEG tube in place that appears clean and dry   Musculoskeletal:         General: Normal range of motion. Right lower leg: No edema. Left lower leg: No edema. Skin:     General: Skin is warm and dry. Neurological:      Comments: Slight dysarthria appreciated. Weakness in the bilateral upper and lower extremities. GCS 15. MDM   70-year-old man presents with the above chief complaint. Vital signs stable. He is chronically ill-appearing but does not appear acutely distressed. He has rales at the bilateral bases. Does not appear volume overloaded. Abdomen soft and nontender. Work-up will include chest x-ray and urinalysis as well as laboratory studies. He is not meeting any sepsis criteria at this time but I am going to recommend admission to the patient's wife given the patient's overall lack of reserve prior history of sepsis with associated aspiration pneumonia. He has findings consistent with this on his exam.    Procedures    Perfect Serve Consult for Admission  12:30 PM    ED Room Number: WT05/TR05  Patient Name and age:  Tere Diop 77 y.o.  male  Working Diagnosis:   1. Aspiration pneumonia, unspecified aspiration pneumonia type, unspecified laterality, unspecified part of lung ([address]    2. Fever, unspecified fever cause    3. Cough        COVID-19 Suspicion:  no  Sepsis present:  no  Reassessment needed: yes  Code Status:  Full Code  Readmission: no  Isolation Requirements:  no  Recommended Level of Care:  med/surg  Department: [facility] ED - [phone]      Other: 70-year-old man who presents with fever and cough at home. Has a history of aspiration pneumonia.   Has been coughing at home and has had a fever.  Has aphasia and dysphagia secondary to prior strokes. White count is 20. Nothing obvious on chest x-ray but has rales at the bilateral bases. Normally does not have a white count. Admitted in April for similar symptoms and will treat for hospital-acquired pneumonia. COVID-negative. Also has a BNP of 12,000 but does not look volume overloaded.

## 2022-05-27 NOTE — ACP (ADVANCE CARE PLANNING)
Primary Decision MakerMaile Butte - [phone]  Advance Care Planning 5/27/2022   Patient's Healthcare Decision Maker is: Legal Next of Kin     Pt does not have AMD on file. In absence of assigned Medical POA, wife Levy Morfin is legal NOK and surrogate decision maker in the event that pt is unable to speak for himself.

## 2022-05-27 NOTE — H&P
[facility]    Hospitalist Admission Note      NAME:  Bonnie Loya   :      MRN:  [id number]     PCP:  Emma Finney MD     Date/Time of service:  2022 1:35 PM          Subjective:     CHIEF COMPLAINT: vomiting and cough     HISTORY OF PRESENT ILLNESS:     Mr. Mya Prescott is a 77 y.o.  male who presented to the Emergency Department complaining of vomiting and cough. He provides no hx. He has a hx of CVA, dysphagia requiring PEG feedings. Recent admit to [facility] for presumed aspiration PNA. Today in our ER xray is bland, he is not hypoxic, and aside from an elevated white count, he is not septic. He is dehydrated. We will admit him for management. Past Medical History:   Diagnosis Date    AF (paroxysmal atrial fibrillation) (HCC)     Anxiety and depression     DM type 2 causing vascular disease ([facility] [address]     Dysphagia as late effect of stroke     Hx of completed stroke     Hyperlipidemia     Hypertension     Hypothyroid     PEG (percutaneous endoscopic gastrostomy) status ([facility]     Rheumatoid arthritis ([facility] [address]         Past Surgical History:   Procedure Laterality Date    HX AMPUTATION Left     left 5 toes    HX HERNIA REPAIR         Social History     Tobacco Use    Smoking status: Former Smoker    Smokeless tobacco: Never Used   Substance Use Topics    Alcohol use: Not on file        History reviewed. No pertinent family history. Family hx cannot be fully assessed, since the patient cannot provide information    No Known Allergies     Prior to Admission medications    Medication Sig Start Date End Date Taking? Authorizing Provider   albuterol sulfate (PROVENTIL;VENTOLIN) 2.5 mg/0.5 mL nebu nebulizer solution 2.5 mg by Nebulization route two (2) times a day. Yes Frankie, MD Casimiro   ondansetron hcl (ZOFRAN) 4 mg tablet Take 4 mg by mouth as needed for Nausea or Vomiting.    Yes Frankie, MD Casimiro   atorvastatin (LIPITOR) 40 mg tablet Take 40 mg by mouth daily (after dinner). Yes Provider, Historical   furosemide (Lasix) 40 mg tablet Take 40 mg by mouth daily (with breakfast). Yes Provider, Historical   hydrocortisone (CORTEF) 5 mg tablet Take 5 mg by mouth daily. Yes Provider, Historical   potassium chloride (KLOR-CON) 20 mEq pack Take 20 mEq by mouth daily (after lunch). Yes Provider, Historical   dantrolene (DANTRIUM) 25 mg capsule Take 25 mg by mouth three (3) times daily. Yes Provider, Historical   dilTIAZem IR (CARDIZEM) 60 mg tablet Take 60 mg by mouth two (2) times a day. Yes Provider, Historical   midodrine (PROAMATINE) 2.5 mg tablet Take 2.5 mg by mouth two (2) times a day. Yes Provider, Historical   carvediloL (COREG) 25 mg tablet Take 25 mg by mouth two (2) times a day. Yes Provider, Historical   traZODone (DESYREL) 50 mg tablet Take 100 mg by mouth nightly. Yes Provider, Historical   apixaban (Eliquis) 5 mg tablet Take 5 mg by mouth two (2) times a day. Yes Provider, Historical   arformoteroL (BROVANA) 15 mcg/2 mL nebu neb solution 15 mcg by Nebulization route daily. Yes Provider, Historical   levothyroxine (SYNTHROID) 75 mcg tablet Take 50 mcg by mouth Daily (before breakfast). Yes Provider, Historical   folic acid (FOLVITE) 1 mg tablet Take 1 mg by mouth daily. Yes Provider, Historical   HYDROcodone-acetaminophen (NORCO) 5-325 mg per tablet Take  by mouth DIALYSIS PRN for Pain. Other, MD Casimiro   insulin glargine (Lantus Solostar U-100 Insulin) 100 unit/mL (3 mL) inpn 12 Units by SubCUTAneous route nightly. Provider, Historical   sertraline (ZOLOFT) 100 mg tablet Take 100 mg by mouth daily (after lunch). Provider, Historical   budesonide (PULMICORT) 0.5 mg/2 mL nbsp 500 mcg by Nebulization route daily.     Provider, Historical       Review of Systems:  (bold if positive, if negative)    Gen:  fatigueEyes:  ENT:  CVS:  Pulm:  Cough, dyspnea,GI:  :  MS:  Skin:  Psych:  Endo:  Hem:  Renal:  Neuro:  weakness      Objective: VITALS:    Vital signs reviewed; most recent are:    Visit Vitals  /74   Pulse 84   Temp 98 °F (36.7 °C)   Resp 18   Wt 72.6 kg (160 lb 0.9 oz)   SpO2 94%   BMI 22.32 kg/m²     SpO2 Readings from Last 6 Encounters:   05/27/22 94%   04/17/22 96%   12/28/10 98%        No intake or output data in the 24 hours ending 05/27/22 1335     Exam:     Physical Exam:    Gen:  Frail, in no acute distress  HEENT:  Pink conjunctivae, PERRL, hearing intact to voice, moist mucous membranes  Neck:  Supple, without masses, thyroid non-tender  Resp:  No accessory muscle use, coarse bilateral breath sounds   Card:  No murmurs, normal S1, S2 without thrills, bruits or peripheral edema  Abd:  Soft, non-tender, non-distended, normoactive bowel sounds are present, no mass  Lymph:  No cervical or inguinal adenopathy  Musc:  No cyanosis or clubbing  Skin:  No rashes or ulcers, skin turgor is good  Neuro:  Cranial nerves are grossly intact, general motor weakness, follows commands   Psych:  Good insight, oriented to person, place and time, alert     Labs:    Recent Labs     05/27/22  1048   WBC 19.6*   HGB 10.4*   HCT 31.9*        Recent Labs     05/27/22  1048   *   K 4.3   CL 99   CO2 26   *   BUN 59*   CREA 1.54*   CA 9.4   MG 2.3   ALB 3.1*   TBILI 0.5   ALT 43     Lab Results   Component Value Date/Time    Glucose (POC) 205 (H) 04/17/2022 12:13 PM    Glucose (POC) 273 (H) 04/17/2022 06:31 AM     No results for input(s): PH, PCO2, PO2, HCO3, FIO2 in the last 72 hours. No results for input(s): INR, INREXT in the last 72 hours.   All Micro Results     Procedure Component Value Units Date/Time    CULTURE, MRSA [[id number]]     Order Status: Sent Specimen: Nasal from Nares     RESPIRATORY VIRUS PANEL W/COVID-19, PCR [[id number]]     Order Status: Sent Specimen: NASOPHARYNGEAL SWAB     CULTURE, BLOOD, PERIPHERAL [[id number]] Collected: 05/27/22 1236    Order Status: Completed Specimen: Blood Updated: 05/27/22 9920 CULTURE, BLOOD, PERIPHERAL [[id number]] Collected: 05/27/22 1236    Order Status: Completed Specimen: Blood Updated: 05/27/22 1257    COVID-19 RAPID TEST [[id number]] Collected: 05/27/22 1133    Order Status: Completed Specimen: Nasopharyngeal Updated: 05/27/22 1200     Specimen source Nasopharyngeal        COVID-19 rapid test Not detected        Comment: Rapid Abbott ID Now       Rapid NAAT:  The specimen is NEGATIVE for SARS-CoV-2, the novel coronavirus associated with COVID-19. Negative results should be treated as presumptive and, if inconsistent with clinical signs and symptoms or necessary for patient management, should be tested with an alternative molecular assay. Negative results do not preclude SARS-CoV-2 infection and should not be used as the sole basis for patient management decisions. This test has been authorized by the FDA under an Emergency Use Authorization (EUA) for use by authorized laboratories. Fact sheet for Healthcare Providers: ConventionUpdate.co.nz  Fact sheet for Patients: ConventionUpdate.co.nz       Methodology: Isothermal Nucleic Acid Amplification               I have reviewed previous records       Assessment and Plan:      Aspiration pneumonia / Leukocytosis - POA, presumed, vs aspiration pneumonitis. In our ER today, xray is bland, he is not hypoxic, and aside from an elevated white count (elevated perhaps due to cortef and vomiting), he is not septic. Check procalcitonin, MRSA screen and blood cx. ER gave Unasyn and Vanco.  Further antibiotics or not, depending on inflammatory tests. ARF (acute renal failure) / Dehydration - POA due to poor PO intake and GI loss. Hydrate and follow. Hold lasix. Nausea & vomiting - Diet as tolerated.  Prn Zofran    Hx of completed stroke / anxiety and depression / Dysphagia as late effect of stroke / PEG (percutaneous endoscopic gastrostomy) status - POA, this plan appears to be going poorly. Continue Dantrolene, trazodone, sertraline    DM type 2 causing vascular disease - Diabetic diet and counseling. SSI per protocol. Continue home Lantus. Check A1c. Anemia - Check serologies    Asthma - He is on inhalers, perhaps he has chronic aspiration. Continue Brovana, pulmicort and prn duonebs    AF (paroxysmal atrial fibrillation - Rate control with diltiazem and coreg, Anticoagulation with Eliquis. Monitor tele    Hypertension - Seems also to have orthostatic hypotension based on his meds. For now continue cortef, midodrine, diltiazem, coreg    Elevated brain natriuretic peptide (BNP) level - No hx of CHF, no edema on xray, no hypoxia. For now hydrate. Rheumatoid arthritis - Not on meds    Hypothyroid - Check TSH. May not need the homeopathic synthroid dose. Hyperlipidemia - Check panel and continue atrovastatin. 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 Statin may not be providing any benefit. Telemetry reviewed:   normal sinus rhythm    Risk of deterioration: high      Total time spent with patient: 48 Minutes I personally reviewed chart, notes, data and current medications in the medical record. I have personally examined and treated the patient at bedside during this period.                  Care Plan discussed with: Patient, Nursing Staff and >50% of time spent in counseling and coordination of care    Discussed:  Care Plan       ___________________________________________________    Attending Physician: Golden Castaneda MD

## 2022-05-27 NOTE — ED NOTES
Pt having increased nausea and retching. Pt medicated with Zofran 4mg IV. Wife states \"im concerned his gastroparesis is getting worse. \"

## 2022-05-27 NOTE — ED NOTES
Pt's blood sugar per spouse is 189 she is now requesting to give him a tube feeding of Osmolyte per Dr Fouzia Ferrell that is fine

## 2022-05-27 NOTE — PROGRESS NOTES
5/27/22  4:31 PM    Care Management Initial Evaluation:    CM reviewed EMR and met with patient and patients spouse in the room to complete the initial evaluation. Confirmed charted demographics. Reason for Admission:   Vomiting and cough                    RUR Score:     16%  Level: Moderate             PCP: First and Last name:   Sabrina Vazquez MD     Name of Practice:    Are you a current patient: Yes/No:  Yes   Approximate date of last visit: April 2022   Can you participate in a virtual visit if needed:     Do you (patient/family) have any concerns for transition/discharge? None                Plan for utilizing home health:   No current home health history ([facility] in the past)- patient goes to Outpatient PT/OT/SPT at [address]:  Full Code      Healthcare Decision Maker:   Click here to complete [address] including selection of the Healthcare Decision Maker Relationship (ie \"Primary\")            Primary Decision Maker: Yoanna Cortes - Spouse - [phone]    Transition of Care Plan:        Patient resides with his spouse a ramp to enter. Patient is wheelchair bound and receives tube feeds. He has all of the DME needed at home including a BSC. His spouse is his main caregiver and his cousin assists on a regular basis. His spouse has a wheelchair van to transport him when needed. 1). Patient admitted for emergent care  2). PT/OT consulted- patient goes to outpatient rehab  3). Family will transport at dc  4). CM following for dc needs    [facility] Management Interventions  PCP Verified by CM:  Yes  Mode of Transport at Discharge: [address] (Family will transport in their wheelchair Gus Cameron)  Transition of [address] (CM Consult): Discharge Planning  Discharge Durable Medical Equipment: No  Physical Therapy Consult: Yes  Occupational Therapy Consult: Yes  Speech Therapy Consult: No  Support Systems: Spouse/Significant Other,Other (Comment) ([place] who assists)  Confirm Follow Up Transport: Family  Discharge Location  Patient Expects to be Discharged to[de-identified] Home with family assistance

## 2022-05-27 NOTE — ED NOTES
Pt's spouse called out and asked if she could him some insulin his sugar check on their system was 273, informed Dr Stacy Moore, he agreed to allow her to give him insulin.

## 2022-05-27 NOTE — ED TRIAGE NOTES
C/o low grade fever with vomiting and lethargy.  Hx of CVA confined to wheel chair with feeding tube

## 2022-05-28 LAB
ALBUMIN SERPL-MCNC: 2.4 G/DL (ref 3.5–5)
ALBUMIN/GLOB SERPL: 0.6 {RATIO} (ref 1.1–2.2)
ALP SERPL-CCNC: 111 U/L (ref 45–117)
ALT SERPL-CCNC: 31 U/L (ref 12–78)
AMPHET UR QL SCN: NEGATIVE
ANION GAP SERPL CALC-SCNC: 9 MMOL/L (ref 5–15)
AST SERPL-CCNC: 20 U/L (ref 15–37)
B PERT DNA SPEC QL NAA+PROBE: NOT DETECTED
BARBITURATES UR QL SCN: NEGATIVE
BENZODIAZ UR QL: NEGATIVE
BILIRUB SERPL-MCNC: 0.5 MG/DL (ref 0.2–1)
BORDETELLA PARAPERTUSSIS PCR, BORPAR: NOT DETECTED
BUN SERPL-MCNC: 55 MG/DL (ref 6–20)
BUN/CREAT SERPL: 42 (ref 12–20)
C PNEUM DNA SPEC QL NAA+PROBE: NOT DETECTED
CALCIUM SERPL-MCNC: 8.9 MG/DL (ref 8.5–10.1)
CANNABINOIDS UR QL SCN: NEGATIVE
CHLORIDE SERPL-SCNC: 106 MMOL/L (ref 97–108)
CO2 SERPL-SCNC: 24 MMOL/L (ref 21–32)
COCAINE UR QL SCN: NEGATIVE
COMMENT, HOLDF: NORMAL
CREAT SERPL-MCNC: 1.3 MG/DL (ref 0.7–1.3)
DRUG SCRN COMMENT,DRGCM: ABNORMAL
ERYTHROCYTE [DISTWIDTH] IN BLOOD BY AUTOMATED COUNT: 15.3 % (ref 11.5–14.5)
FLUAV H1 2009 PAND RNA SPEC QL NAA+PROBE: NOT DETECTED
FLUAV H1 RNA SPEC QL NAA+PROBE: NOT DETECTED
FLUAV H3 RNA SPEC QL NAA+PROBE: NOT DETECTED
FLUAV SUBTYP SPEC NAA+PROBE: NOT DETECTED
FLUBV RNA SPEC QL NAA+PROBE: NOT DETECTED
GLOBULIN SER CALC-MCNC: 4.2 G/DL (ref 2–4)
GLUCOSE BLD STRIP.AUTO-MCNC: 178 MG/DL (ref 65–117)
GLUCOSE BLD STRIP.AUTO-MCNC: 184 MG/DL (ref 65–117)
GLUCOSE BLD STRIP.AUTO-MCNC: 193 MG/DL (ref 65–117)
GLUCOSE BLD STRIP.AUTO-MCNC: 295 MG/DL (ref 65–117)
GLUCOSE SERPL-MCNC: 166 MG/DL (ref 65–100)
HADV DNA SPEC QL NAA+PROBE: NOT DETECTED
HAPTOGLOB SERPL-MCNC: 296 MG/DL (ref 30–200)
HCOV 229E RNA SPEC QL NAA+PROBE: NOT DETECTED
HCOV HKU1 RNA SPEC QL NAA+PROBE: NOT DETECTED
HCOV NL63 RNA SPEC QL NAA+PROBE: NOT DETECTED
HCOV OC43 RNA SPEC QL NAA+PROBE: NOT DETECTED
HCT VFR BLD AUTO: 28.1 % (ref 36.6–50.3)
HGB BLD-MCNC: 9.2 G/DL (ref 12.1–17)
HMPV RNA SPEC QL NAA+PROBE: NOT DETECTED
HPIV1 RNA SPEC QL NAA+PROBE: NOT DETECTED
HPIV2 RNA SPEC QL NAA+PROBE: NOT DETECTED
HPIV3 RNA SPEC QL NAA+PROBE: NOT DETECTED
HPIV4 RNA SPEC QL NAA+PROBE: DETECTED
IRON SATN MFR SERPL: 4 % (ref 20–50)
IRON SERPL-MCNC: 10 UG/DL (ref 35–150)
M PNEUMO DNA SPEC QL NAA+PROBE: NOT DETECTED
MAGNESIUM SERPL-MCNC: 2.4 MG/DL (ref 1.6–2.4)
MCH RBC QN AUTO: 28.3 PG (ref 26–34)
MCHC RBC AUTO-ENTMCNC: 32.7 G/DL (ref 30–36.5)
MCV RBC AUTO: 86.5 FL (ref 80–99)
METHADONE UR QL: NEGATIVE
NRBC # BLD: 0 K/UL (ref 0–0.01)
NRBC BLD-RTO: 0 PER 100 WBC
OPIATES UR QL: POSITIVE
PCP UR QL: NEGATIVE
PLATELET # BLD AUTO: 157 K/UL (ref 150–400)
POTASSIUM SERPL-SCNC: 3.9 MMOL/L (ref 3.5–5.1)
PROT SERPL-MCNC: 6.6 G/DL (ref 6.4–8.2)
RBC # BLD AUTO: 3.25 M/UL (ref 4.1–5.7)
RSV RNA SPEC QL NAA+PROBE: NOT DETECTED
RV+EV RNA SPEC QL NAA+PROBE: NOT DETECTED
SAMPLES BEING HELD,HOLD: NORMAL
SARS-COV-2 PCR, COVPCR: NOT DETECTED
SERVICE CMNT-IMP: ABNORMAL
SODIUM SERPL-SCNC: 139 MMOL/L (ref 136–145)
TIBC SERPL-MCNC: 251 UG/DL (ref 250–450)
WBC # BLD AUTO: 15.8 K/UL (ref 4.1–11.1)

## 2022-05-28 PROCEDURE — 94664 DEMO&/EVAL PT USE INHALER: CPT

## 2022-05-28 PROCEDURE — 94640 AIRWAY INHALATION TREATMENT: CPT

## 2022-05-28 PROCEDURE — 83735 ASSAY OF MAGNESIUM: CPT

## 2022-05-28 PROCEDURE — 74011000250 HC RX REV CODE- 250: Performed by: INTERNAL MEDICINE

## 2022-05-28 PROCEDURE — 85027 COMPLETE CBC AUTOMATED: CPT

## 2022-05-28 PROCEDURE — 82962 GLUCOSE BLOOD TEST: CPT

## 2022-05-28 PROCEDURE — 0202U NFCT DS 22 TRGT SARS-COV-2: CPT

## 2022-05-28 PROCEDURE — 36415 COLL VENOUS BLD VENIPUNCTURE: CPT

## 2022-05-28 PROCEDURE — 65270000029 HC RM PRIVATE

## 2022-05-28 PROCEDURE — 74011250636 HC RX REV CODE- 250/636: Performed by: INTERNAL MEDICINE

## 2022-05-28 PROCEDURE — 80053 COMPREHEN METABOLIC PANEL: CPT

## 2022-05-28 PROCEDURE — 94761 N-INVAS EAR/PLS OXIMETRY MLT: CPT

## 2022-05-28 PROCEDURE — 74011250637 HC RX REV CODE- 250/637: Performed by: INTERNAL MEDICINE

## 2022-05-28 PROCEDURE — 74011636637 HC RX REV CODE- 636/637: Performed by: INTERNAL MEDICINE

## 2022-05-28 RX ORDER — AMOXICILLIN AND CLAVULANATE POTASSIUM 400; 57 MG/5ML; MG/5ML
400 POWDER, FOR SUSPENSION ORAL EVERY 12 HOURS
Status: DISCONTINUED | OUTPATIENT
Start: 2022-05-28 | End: 2022-05-28

## 2022-05-28 RX ORDER — AMOXICILLIN AND CLAVULANATE POTASSIUM 400; 57 MG/5ML; MG/5ML
400 POWDER, FOR SUSPENSION ORAL EVERY 12 HOURS
Status: DISCONTINUED | OUTPATIENT
Start: 2022-05-28 | End: 2022-05-29 | Stop reason: HOSPADM

## 2022-05-28 RX ORDER — POLYETHYLENE GLYCOL 3350 17 G/17G
17 POWDER, FOR SOLUTION ORAL 2 TIMES DAILY
Status: DISCONTINUED | OUTPATIENT
Start: 2022-05-28 | End: 2022-05-29 | Stop reason: HOSPADM

## 2022-05-28 RX ADMIN — CARVEDILOL 25 MG: 12.5 TABLET, FILM COATED ORAL at 21:52

## 2022-05-28 RX ADMIN — APIXABAN 5 MG: 5 TABLET, FILM COATED ORAL at 21:52

## 2022-05-28 RX ADMIN — LEVOTHYROXINE SODIUM 50 MCG: 0.05 TABLET ORAL at 06:00

## 2022-05-28 RX ADMIN — HYDROCORTISONE 5 MG: 10 TABLET ORAL at 09:38

## 2022-05-28 RX ADMIN — FOLIC ACID 1 MG: 1 TABLET ORAL at 09:39

## 2022-05-28 RX ADMIN — DANTROLENE SODIUM 25 MG: 25 CAPSULE ORAL at 10:02

## 2022-05-28 RX ADMIN — ACETAMINOPHEN 650 MG: 325 TABLET ORAL at 12:54

## 2022-05-28 RX ADMIN — CARVEDILOL 25 MG: 12.5 TABLET, FILM COATED ORAL at 09:39

## 2022-05-28 RX ADMIN — TRAZODONE HYDROCHLORIDE 100 MG: 100 TABLET ORAL at 21:53

## 2022-05-28 RX ADMIN — AMOXICILLIN AND CLAVULANATE POTASSIUM 400 MG: 400; 57 POWDER, FOR SUSPENSION ORAL at 21:52

## 2022-05-28 RX ADMIN — ATORVASTATIN CALCIUM 40 MG: 20 TABLET, FILM COATED ORAL at 17:26

## 2022-05-28 RX ADMIN — DILTIAZEM HYDROCHLORIDE 60 MG: 30 TABLET, FILM COATED ORAL at 09:39

## 2022-05-28 RX ADMIN — ARFORMOTEROL TARTRATE 15 MCG: 15 SOLUTION RESPIRATORY (INHALATION) at 08:04

## 2022-05-28 RX ADMIN — DANTROLENE SODIUM 25 MG: 25 CAPSULE ORAL at 17:26

## 2022-05-28 RX ADMIN — DANTROLENE SODIUM 25 MG: 25 CAPSULE ORAL at 23:00

## 2022-05-28 RX ADMIN — SERTRALINE 200 MG: 50 TABLET, FILM COATED ORAL at 12:54

## 2022-05-28 RX ADMIN — APIXABAN 5 MG: 5 TABLET, FILM COATED ORAL at 09:39

## 2022-05-28 RX ADMIN — DILTIAZEM HYDROCHLORIDE 60 MG: 30 TABLET, FILM COATED ORAL at 17:26

## 2022-05-28 RX ADMIN — POLYETHYLENE GLYCOL 3350 17 G: 17 POWDER, FOR SOLUTION ORAL at 12:54

## 2022-05-28 RX ADMIN — SODIUM CHLORIDE 75 ML/HR: 9 INJECTION, SOLUTION INTRAVENOUS at 16:06

## 2022-05-28 RX ADMIN — AMOXICILLIN AND CLAVULANATE POTASSIUM 400 MG: 400; 57 POWDER, FOR SUSPENSION ORAL at 10:03

## 2022-05-28 RX ADMIN — Medication 5 UNITS: at 17:27

## 2022-05-28 NOTE — PROGRESS NOTES
Physical Therapy:     Orders received and chart reviewed in preparation for PT evaluation. Spoke with patient's wife who was present in room. Patient's wife is patient's primary caregiver and she states patient is wheelchair bound at baseline, and that she and other family use a radha lift at home for mobilizing patient. Patient's wife states she has all DME and transfer equipment needed and that patient is at functional PLOF. Patient's wife stated she has had home health PT for patient before, but that she and her family prefer to take patient to outpatient PT for better consistency of care. As patient is at reported PLOF and patient's family has plan in place for patient care as well as all DME and equipment needed, will sign off for PT. Recommend use of mobility team to transfer patient with radha lift per PLOF while admitted to hospital as needed.      Thank you,    Janelle Baird, PT, DPT

## 2022-05-28 NOTE — PROGRESS NOTES
Bedside and Verbal shift change report given to Reji Lundy RN (oncoming nurse) by Daren Longoria RN (offgoing nurse). Report included the following information SBAR, Intake/Output, MAR, Recent Results, Med Rec Status and Cardiac Rhythm .

## 2022-05-28 NOTE — PROGRESS NOTES
Spiritual Care Assessment/Progress Note  [address]      NAME: Min Olivas      MRN: [id number]  AGE: 77 y.o. SEX: male  Adventism Affiliation: Muslim   Language: English     5/28/2022     Total Time (in minutes): 15     Spiritual Assessment begun in [facility] 5M1 MED SURG 1 through conversation with:         []Patient        [] Family    [] Friend(s)        Reason for Consult: Palliative Care, Initial/Spiritual Assessment     Spiritual beliefs: (Please include comment if needed)     [] Identifies with a lucrecia tradition:         [] Supported by a lucrecia community:            [] Claims no spiritual orientation:           [] Seeking spiritual identity:                [] Adheres to an individual form of spirituality:           [x] Not able to assess:                           Identified resources for coping:      [] Prayer                               [] Music                  [] Guided Imagery     [] Family/friends                 [] Pet visits     [] Devotional reading                         [x] Unknown     [] Other:                                              Interventions offered during this visit: (See comments for more details)    Patient Interventions: Initial visit           Plan of Care:     [] Support spiritual and/or cultural needs    [] Support AMD and/or advance care planning process      [] Support grieving process   [] Coordinate Rites and/or Rituals    [] Coordination with community clergy   [] No spiritual needs identified at this time   [] Detailed Plan of Care below (See Comments)  [] Make referral to Music Therapy  [] Make referral to Pet Therapy     [] Make referral to Addiction services  [] Make referral to [facility]  [] Make referral to Spiritual Care Partner  [] No future visits requested        [x] Contact Spiritual Care for further referrals     Attempted visit for palliative initial spiritual assessment.  Unable to visit patient at this time as he was sleeping and did not awake. No family present. Pt's chart was consulted.   Chaplain Benitez MDiv, MS, [facility]

## 2022-05-28 NOTE — PROGRESS NOTES
[facility]    Medical Progress Note      NAME: Tere Diop   :  1955  MRM:  [id number]    Date/Time of service 2022  9:50 AM          Assessment and Plan:     Aspiration pneumonia / Parainfluenza virus infection / Leukocytosis - POA, presumed, vs aspiration pneumonitis. Parainfluenza 4 noted on viral panel. Xray is bland, he is not hypoxic, and aside from an elevated white count (elevated perhaps due to cortef and vomiting), he is not septic. Minimally elevated procalcitonin. Nevertheless, he is exceptionally high risk of aspiration, so complete a course of PEG augmentin. Checking MRSA screen and blood cx.     ARF (acute renal failure) / Dehydration - POA due to poor PO intake and GI loss. Hydrated and improved. Hold lasix.     Nausea & vomiting - Resolved. PEG feeding as tolerated. Prn Zofran     Hx of completed stroke / anxiety and depression / Dysphagia as late effect of stroke / PEG (percutaneous endoscopic gastrostomy) status - POA, this plan appears to be going poorly. Continue Dantrolene, trazodone, sertraline     DM type 2 causing vascular disease - Diabetic diet and counseling. SSI per protocol. Continue home Lantus. A1c 8.0.     Anemia - Unremarkable serologies     Asthma - He is on inhalers, perhaps he has chronic aspiration. Continue Brovana, pulmicort and prn duonebs     AF (paroxysmal atrial fibrillation - Rate control with diltiazem and coreg, Anticoagulation with Eliquis. Monitor tele     Hypertension - Seems also to have orthostatic hypotension based on his meds. For now continue cortef, midodrine, diltiazem, coreg     Elevated brain natriuretic peptide (BNP) level - No hx of CHF, no edema on xray, no hypoxia. For now hydrate.     Rheumatoid arthritis - Not on meds     Hypothyroid - Noraml TSH. May not need the homeopathic synthroid dose.     Hyperlipidemia - LDL 31 on panel. Stop atrovastatin. 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   Statin may not be providing any benefit. Subjective:     Chief Complaint:  Whole body ache    ROS:  (bold if positive, if negative)    Tolerating minimal PT Tolerating PEG Diet        Objective:     Last 24hrs VS reviewed since prior progress note.  Most recent are:    Visit Vitals  BP (!) 155/74 (BP 1 Location: Left upper arm, BP Patient Position: At rest)   Pulse 70   Temp 97.9 °F (36.6 °C)   Resp 18   Wt 74.4 kg (164 lb 0.4 oz)   SpO2 94%   BMI 22.88 kg/m²     SpO2 Readings from Last 6 Encounters:   05/28/22 94%   04/17/22 96%   12/28/10 98%            Intake/Output Summary (Last 24 hours) at 5/28/2022 0950  Last data filed at 5/28/2022 0518  Gross per 24 hour   Intake 100 ml   Output 400 ml   Net -300 ml        Physical Exam:    Gen:  Frail, lll appearing, in no acute distress  HEENT:  Pink conjunctivae, PERRL, hearing intact to voice, moist mucous membranes  Neck:  Supple, without masses, thyroid non-tender  Resp:  No accessory muscle use, coarse breath sounds without wheezes rales or rhonchi  Card:  No murmurs, normal S1, S2 without thrills, bruits or peripheral edema  Abd:  Soft, non-tender, non-distended, normoactive bowel sounds are present, no mass  Lymph:  No cervical or inguinal adenopathy  Musc:  No cyanosis or clubbing  Skin:  No rashes or ulcers, skin turgor is good  Neuro:  Cranial nerves are grossly intact, general motor weakness, follows commands   Psych:  Poor insight, oriented to person, place and time, alert    Telemetry reviewed:   normal sinus rhythm  __________________________________________________________________  Medications Reviewed: (see below)  Medications:     Current Facility-Administered Medications   Medication Dose Route Frequency    amoxicillin-clavulanate (AUGMENTIN) 400-57 mg/5 mL oral suspension 400 mg  400 mg Per G Tube Q12H    apixaban (ELIQUIS) tablet 5 mg  5 mg Oral BID    arformoteroL (BROVANA) neb solution 15 mcg  15 mcg Nebulization DAILY    atorvastatin (LIPITOR) tablet 40 mg  40 mg Oral PCD    budesonide (PULMICORT) 500 mcg/2 ml nebulizer suspension  500 mcg Nebulization QHS    dantrolene (DANTRIUM) capsule 25 mg  25 mg Oral TID    dilTIAZem IR (CARDIZEM) tablet 60 mg  60 mg Oral BID WITH MEALS    folic acid (FOLVITE) tablet 1 mg  1 mg Oral DAILY    hydrocortisone (CORTEF) tablet 5 mg  5 mg Oral DAILY    insulin glargine (LANTUS) injection 12 Units  12 Units SubCUTAneous DAILY    levothyroxine (SYNTHROID) tablet 50 mcg  50 mcg Oral ACB    [Held by provider] midodrine (PROAMATINE) tablet 2.5 mg  2.5 mg Oral BID    traZODone (DESYREL) tablet 100 mg  100 mg Oral QHS    glucose chewable tablet 16 g  4 Tablet Oral PRN    dextrose 10% infusion 0-250 mL  0-250 mL IntraVENous PRN    glucagon (GLUCAGEN) injection 1 mg  1 mg IntraMUSCular PRN    acetaminophen (TYLENOL) tablet 650 mg  650 mg Oral Q6H PRN    Or    acetaminophen (TYLENOL) suppository 650 mg  650 mg Rectal Q6H PRN    polyethylene glycol (MIRALAX) packet 17 g  17 g Oral DAILY PRN    ondansetron (ZOFRAN ODT) tablet 4 mg  4 mg Oral Q8H PRN    Or    ondansetron (ZOFRAN) injection 4 mg  4 mg IntraVENous Q6H PRN    insulin lispro (HUMALOG) injection   SubCUTAneous AC&HS    0.9% sodium chloride infusion  75 mL/hr IntraVENous CONTINUOUS    sertraline (ZOLOFT) tablet 200 mg  200 mg Oral PCL    carvediloL (COREG) tablet 25 mg  25 mg Oral BID        Lab Data Reviewed: (see below)  Lab Review:     Recent Labs     05/28/22  0529 05/27/22  1048   WBC 15.8* 19.6*   HGB 9.2* 10.4*   HCT 28.1* 31.9*    175     Recent Labs     05/28/22  0529 05/27/22  1048    133*   K 3.9 4.3    99   CO2 24 26   * 201*   BUN 55* 59*   CREA 1.30 1.54*   CA 8.9 9.4   MG 2.4 2.3   ALB 2.4* 3.1*   TBILI 0.5 0.5   ALT 31 43     Lab Results   Component Value Date/Time    Glucose (POC) 178 (H) 05/28/2022 06:27 AM    Glucose (POC) 144 (H) 05/27/2022 09:51 PM    Glucose (POC) 205 (H) 04/17/2022 12:13 PM    Glucose (POC) 273 (H) 04/17/2022 06:31 AM    Glucose (POC) 140 (H) 04/16/2022 11:17 PM     No results for input(s): PH, PCO2, PO2, HCO3, FIO2 in the last 72 hours. No results for input(s): INR, INREXT in the last 72 hours. All Micro Results     Procedure Component Value Units Date/Time    CULTURE, MRSA [[id number]] Collected: 05/28/22 0529    Order Status: Completed Specimen: Nasal from Nares Updated: 05/28/22 0615    RESPIRATORY VIRUS PANEL W/COVID-19, PCR [[id number]] Collected: 05/28/22 0529    Order Status: Completed Specimen: NASOPHARYNGEAL SWAB Updated: 05/28/22 0614    CULTURE, BLOOD, PERIPHERAL [[id number]] Collected: 05/27/22 1236    Order Status: Completed Specimen: Blood Updated: 05/28/22 0519     Special Requests: NO SPECIAL REQUESTS        Culture result: NO GROWTH AFTER 6 HOURS       CULTURE, BLOOD, PERIPHERAL [[id number]] Collected: 05/27/22 1236    Order Status: Completed Specimen: Blood Updated: 05/28/22 0519     Special Requests: NO SPECIAL REQUESTS        Culture result: NO GROWTH AFTER 6 HOURS       COVID-19 RAPID TEST [[id number]] Collected: 05/27/22 1133    Order Status: Completed Specimen: Nasopharyngeal Updated: 05/27/22 1200     Specimen source Nasopharyngeal        COVID-19 rapid test Not detected        Comment: Rapid Abbott ID Now       Rapid NAAT:  The specimen is NEGATIVE for SARS-CoV-2, the novel coronavirus associated with COVID-19. Negative results should be treated as presumptive and, if inconsistent with clinical signs and symptoms or necessary for patient management, should be tested with an alternative molecular assay. Negative results do not preclude SARS-CoV-2 infection and should not be used as the sole basis for patient management decisions. This test has been authorized by the FDA under an Emergency Use Authorization (EUA) for use by authorized laboratories.    Fact sheet for Healthcare Providers: Mayda.fi  Fact sheet for Patients: ConventionUpdate.co.nz       Methodology: Isothermal Nucleic Acid Amplification               Other pertinent lab: none    Total time spent with patient: 30 Minutes I personally reviewed chart, notes, data and current medications in the medical record. I have personally examined and treated the patient at bedside during this period.                  Care Plan discussed with: Patient, Family, Care Manager, Nursing Staff and >50% of time spent in counseling and coordination of care    Discussed:  Care Plan and D/C Planning    Prophylaxis:  H2B/PPI    Disposition:  Home w/Family           ___________________________________________________    Attending Physician: Frank Shahid MD

## 2022-05-29 VITALS
TEMPERATURE: 97.9 F | BODY MASS INDEX: 22.88 KG/M2 | SYSTOLIC BLOOD PRESSURE: 131 MMHG | OXYGEN SATURATION: 98 % | HEART RATE: 94 BPM | RESPIRATION RATE: 14 BRPM | WEIGHT: 164.02 LBS | DIASTOLIC BLOOD PRESSURE: 69 MMHG

## 2022-05-29 LAB
BACTERIA SPEC CULT: ABNORMAL
GLUCOSE BLD STRIP.AUTO-MCNC: 392 MG/DL (ref 65–117)
SERVICE CMNT-IMP: ABNORMAL
SERVICE CMNT-IMP: ABNORMAL

## 2022-05-29 PROCEDURE — 2709999900 HC NON-CHARGEABLE SUPPLY

## 2022-05-29 PROCEDURE — 74011636637 HC RX REV CODE- 636/637: Performed by: INTERNAL MEDICINE

## 2022-05-29 PROCEDURE — 74011250636 HC RX REV CODE- 250/636: Performed by: INTERNAL MEDICINE

## 2022-05-29 PROCEDURE — 74011250637 HC RX REV CODE- 250/637: Performed by: INTERNAL MEDICINE

## 2022-05-29 PROCEDURE — 82962 GLUCOSE BLOOD TEST: CPT

## 2022-05-29 RX ORDER — AMOXICILLIN AND CLAVULANATE POTASSIUM 400; 57 MG/5ML; MG/5ML
400 POWDER, FOR SUSPENSION ORAL EVERY 12 HOURS
Qty: 50 ML | Refills: 0 | Status: SHIPPED | OUTPATIENT
Start: 2022-05-29 | End: 2022-06-03

## 2022-05-29 RX ORDER — FUROSEMIDE 40 MG/1
40 TABLET ORAL
Qty: 30 TABLET | Refills: 0 | Status: SHIPPED
Start: 2022-05-29 | End: 2022-06-28

## 2022-05-29 RX ADMIN — Medication 12 UNITS: at 09:04

## 2022-05-29 RX ADMIN — SODIUM CHLORIDE 75 ML/HR: 9 INJECTION, SOLUTION INTRAVENOUS at 05:18

## 2022-05-29 RX ADMIN — LEVOTHYROXINE SODIUM 50 MCG: 0.05 TABLET ORAL at 06:00

## 2022-05-29 RX ADMIN — AMOXICILLIN AND CLAVULANATE POTASSIUM 400 MG: 400; 57 POWDER, FOR SUSPENSION ORAL at 09:08

## 2022-05-29 RX ADMIN — CARVEDILOL 25 MG: 12.5 TABLET, FILM COATED ORAL at 09:03

## 2022-05-29 RX ADMIN — APIXABAN 5 MG: 5 TABLET, FILM COATED ORAL at 09:04

## 2022-05-29 RX ADMIN — DANTROLENE SODIUM 25 MG: 25 CAPSULE ORAL at 09:07

## 2022-05-29 RX ADMIN — HYDROCORTISONE 5 MG: 10 TABLET ORAL at 09:03

## 2022-05-29 RX ADMIN — INSULIN GLARGINE 12 UNITS: 100 INJECTION, SOLUTION SUBCUTANEOUS at 09:04

## 2022-05-29 RX ADMIN — FOLIC ACID 1 MG: 1 TABLET ORAL at 09:04

## 2022-05-29 RX ADMIN — DILTIAZEM HYDROCHLORIDE 60 MG: 30 TABLET, FILM COATED ORAL at 09:03

## 2022-05-29 NOTE — PROGRESS NOTES
Bedside and Verbal shift change report given to Paula Carr, [facility] (oncoming nurse) by Mirna Muir RN (offgoing nurse). Report included the following information feeding schedule, SBAR, Intake/Output, MAR, Recent Results and Med Rec Status.

## 2022-05-29 NOTE — DISCHARGE SUMMARY
Physician Discharge Summary     Patient ID:  Rosemary Cohn  [id number]  77 y.o.  1955    Admit date: 5/27/2022    Discharge date of service and time: 5/29/2022    Admission Diagnoses: Aspiration pneumonia [facility]) [J69.0]    Discharge Diagnoses:    Principal Diagnosis     Aspiration pneumonia                                             Hospital Course and other diagnoses  Aspiration pneumonia / Parainfluenza virus infection / Leukocytosis - POA, presumed.  Parainfluenza 4 also noted on viral panel. Xray is bland, he is not hypoxic, and aside from an elevated white count (elevated perhaps due to cortef and vomiting), he is not septic.  Minimally elevated procalcitonin. Nevertheless, he is exceptionally high risk of aspiration, so I plan to complete a course of PEG augmentin. Negative MRSA screen and blood cx.     ARF (acute renal failure) / Dehydration - POA due to poor PO intake and GI loss.  Hydrated and improved.  Make lasix prn.     Nausea & vomiting - Resolved. PEG feeding is tolerated. Prn Zofran     Hx of completed stroke / anxiety and depression / Dysphagia as late effect of stroke / PEG (percutaneous endoscopic gastrostomy) status - POA, this plan appears to be going somewhat poorly.  Continue Dantrolene, trazodone, sertraline     DM type 2 causing vascular disease - Diabetic diet and counseling.  SSI per protocol.  Continue home Lantus. A1c 8.0.   BG high today because he refused Lantus lastnight     Anemia - Unremarkable serologies     Asthma - He is on inhalers, perhaps he has chronic aspiration.  Continue Brovana, pulmicort and prn duonebs     AF (paroxysmal atrial fibrillation - Rate control with diltiazem and coreg, Anticoagulation with Eliquis.  Monitor tele     Hypertension - Seems also to have orthostatic hypotension based on his meds.  For now continue cortef, midodrine, diltiazem, coreg     Elevated brain natriuretic peptide (BNP) level - No hx of CHF, no edema on xray, no hypoxia.  For now hydrate.     Rheumatoid arthritis - Not on meds     Hypothyroid - Noraml TSH. PCP may choose to stop the homeopathic synthroid dose.     Hyperlipidemia - LDL 31 on panel. I recommend PCP stop atrovastatin.  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  Statin may not be providing any benefit.     PCP: Nadiya Chan MD    Consults: None    Significant Diagnostic Studies: See Hospital Course    Discharged home in improved condition. Discharge Exam:  BP (!) 146/87 (BP 1 Location: Right upper arm, BP Patient Position: At rest)   Pulse 87   Temp 97.7 °F (36.5 °C)   Resp 14   Wt 74.4 kg (164 lb 0.4 oz)   SpO2 96%   BMI 22.88 kg/m²      Gen:  Frail, lll appearing, in no acute distress  HEENT:  Pink conjunctivae, PERRL, hearing intact to voice, moist mucous membranes  Neck:  Supple, without masses, thyroid non-tender  Resp:  No accessory muscle use, coarse breath sounds without wheezes rales or rhonchi  Card:  No murmurs, normal S1, S2 without thrills, bruits or peripheral edema  Abd:  Soft, non-tender, non-distended, normoactive bowel sounds are present, no mass  Lymph:  No cervical or inguinal adenopathy  Musc:  No cyanosis or clubbing  Skin:  No rashes or ulcers, skin turgor is good  Neuro:  Cranial nerves are grossly intact, general motor weakness, follows commands   Psych:  Poor insight, oriented to person, place and time, alert    Patient Instructions:   Current Discharge Medication List      START taking these medications    Details   amoxicillin-clavulanate (AUGMENTIN) 400-57 mg/5 mL suspension 5 mL by Per G Tube route every twelve (12) hours for 5 days. Qty: 50 mL, Refills: 0         CONTINUE these medications which have CHANGED    Details   furosemide (Lasix) 40 mg tablet Take 1 Tablet by mouth daily as needed for PRN Reason (Other) (fluid overload) for up to 30 days.   Qty: 30 Tablet, Refills: 0         CONTINUE these medications which have NOT CHANGED    Details   albuterol sulfate (PROVENTIL;VENTOLIN) 2.5 mg/0.5 mL nebu nebulizer solution 2.5 mg by Nebulization route two (2) times a day. ondansetron hcl (ZOFRAN) 4 mg tablet Take 4 mg by mouth as needed for Nausea or Vomiting. atorvastatin (LIPITOR) 40 mg tablet Take 40 mg by mouth daily (after dinner). hydrocortisone (CORTEF) 5 mg tablet Take 5 mg by mouth daily. potassium chloride (KLOR-CON) 20 mEq pack Take 20 mEq by mouth daily (after lunch). dantrolene (DANTRIUM) 25 mg capsule Take 25 mg by mouth three (3) times daily. dilTIAZem IR (CARDIZEM) 60 mg tablet Take 60 mg by mouth two (2) times a day. midodrine (PROAMATINE) 2.5 mg tablet Take 2.5 mg by mouth two (2) times a day. carvediloL (COREG) 25 mg tablet Take 25 mg by mouth two (2) times a day. traZODone (DESYREL) 50 mg tablet Take 100 mg by mouth nightly. apixaban (Eliquis) 5 mg tablet Take 5 mg by mouth two (2) times a day. arformoteroL (BROVANA) 15 mcg/2 mL nebu neb solution 15 mcg by Nebulization route daily. levothyroxine (SYNTHROID) 75 mcg tablet Take 50 mcg by mouth Daily (before breakfast). folic acid (FOLVITE) 1 mg tablet Take 1 mg by mouth daily. HYDROcodone-acetaminophen (NORCO) 5-325 mg per tablet Take  by mouth DIALYSIS PRN for Pain. insulin glargine (Lantus Solostar U-100 Insulin) 100 unit/mL (3 mL) inpn 12 Units by SubCUTAneous route nightly. sertraline (ZOLOFT) 100 mg tablet Take 200 mg by mouth daily (after lunch). budesonide (PULMICORT) 0.5 mg/2 mL nbsp 500 mcg by Nebulization route daily. Activity: Activity as tolerated  Diet: PEG feeding as usual  Wound Care: Reinforce dressing PRN and As directed    Follow-up with your PCP in a few weeks.   Follow-up tests/labs - none    Signed:  Rufina Banda MD  5/29/2022  8:59 AM

## 2022-05-29 NOTE — PROGRESS NOTES
[facility]    Medical Progress Note      NAME: Dada Horvath   :  1955  MRM:  [id number]    Date/Time of service 2022  7:30 AM          Assessment and Plan:     Aspiration pneumonia / Parainfluenza virus infection / Leukocytosis - POA, presumed. Parainfluenza 4 also noted on viral panel. Xray is bland, he is not hypoxic, and aside from an elevated white count (elevated perhaps due to cortef and vomiting), he is not septic. Minimally elevated procalcitonin. Nevertheless, he is exceptionally high risk of aspiration, so I plan to complete a course of PEG augmentin. Negative MRSA screen and blood cx.     ARF (acute renal failure) / Dehydration - POA due to poor PO intake and GI loss. Hydrated and improved. Make lasix prn.     Nausea & vomiting - Resolved. PEG feeding is tolerated. Prn Zofran     Hx of completed stroke / anxiety and depression / Dysphagia as late effect of stroke / PEG (percutaneous endoscopic gastrostomy) status - POA, this plan appears to be going somewhat poorly. Continue Dantrolene, trazodone, sertraline     DM type 2 causing vascular disease - Diabetic diet and counseling. SSI per protocol. Continue home Lantus. A1c 8.0. BG high today because he refused Lantus lastnight     Anemia - Unremarkable serologies     Asthma - He is on inhalers, perhaps he has chronic aspiration. Continue Brovana, pulmicort and prn duonebs     AF (paroxysmal atrial fibrillation - Rate control with diltiazem and coreg, Anticoagulation with Eliquis. Monitor tele     Hypertension - Seems also to have orthostatic hypotension based on his meds. For now continue cortef, midodrine, diltiazem, coreg     Elevated brain natriuretic peptide (BNP) level - No hx of CHF, no edema on xray, no hypoxia. For now hydrate.     Rheumatoid arthritis - Not on meds     Hypothyroid - Noraml TSH. May not need the homeopathic synthroid dose.     Hyperlipidemia - LDL 31 on panel. Stop atrovastatin.   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 Statin may not be providing any benefit. Subjective:     Chief Complaint:  Whole body ache    ROS:  (bold if positive, if negative)    Tolerating minimal PT Tolerating PEG Diet        Objective:     Last 24hrs VS reviewed since prior progress note.  Most recent are:    Visit Vitals  BP (!) 146/87 (BP 1 Location: Right upper arm, BP Patient Position: At rest)   Pulse 87   Temp 97.7 °F (36.5 °C)   Resp 14   Wt 74.4 kg (164 lb 0.4 oz)   SpO2 96%   BMI 22.88 kg/m²     SpO2 Readings from Last 6 Encounters:   05/29/22 96%   04/17/22 96%   12/28/10 98%            Intake/Output Summary (Last 24 hours) at 5/29/2022 0730  Last data filed at 5/29/2022 8492  Gross per 24 hour   Intake 470 ml   Output 1000 ml   Net -530 ml        Physical Exam:    Gen:  Frail, lll appearing, in no acute distress  HEENT:  Pink conjunctivae, PERRL, hearing intact to voice, moist mucous membranes  Neck:  Supple, without masses, thyroid non-tender  Resp:  No accessory muscle use, coarse breath sounds without wheezes rales or rhonchi  Card:  No murmurs, normal S1, S2 without thrills, bruits or peripheral edema  Abd:  Soft, non-tender, non-distended, normoactive bowel sounds are present, no mass  Lymph:  No cervical or inguinal adenopathy  Musc:  No cyanosis or clubbing  Skin:  No rashes or ulcers, skin turgor is good  Neuro:  Cranial nerves are grossly intact, general motor weakness, follows commands   Psych:  Poor insight, oriented to person, place and time, alert    Telemetry reviewed:   normal sinus rhythm  __________________________________________________________________  Medications Reviewed: (see below)  Medications:     Current Facility-Administered Medications   Medication Dose Route Frequency    amoxicillin-clavulanate (AUGMENTIN) 400-57 mg/5 mL oral suspension 400 mg  400 mg Per G Tube Q12H    polyethylene glycol (MIRALAX) packet 17 g  17 g Oral BID    apixaban (ELIQUIS) tablet 5 mg  5 mg Oral BID    arformoteroL (BROVANA) neb solution 15 mcg  15 mcg Nebulization DAILY    atorvastatin (LIPITOR) tablet 40 mg  40 mg Oral PCD    budesonide (PULMICORT) 500 mcg/2 ml nebulizer suspension  500 mcg Nebulization QHS    dantrolene (DANTRIUM) capsule 25 mg  25 mg Oral TID    dilTIAZem IR (CARDIZEM) tablet 60 mg  60 mg Oral BID WITH MEALS    folic acid (FOLVITE) tablet 1 mg  1 mg Oral DAILY    hydrocortisone (CORTEF) tablet 5 mg  5 mg Oral DAILY    insulin glargine (LANTUS) injection 12 Units  12 Units SubCUTAneous DAILY    levothyroxine (SYNTHROID) tablet 50 mcg  50 mcg Oral ACB    [Held by provider] midodrine (PROAMATINE) tablet 2.5 mg  2.5 mg Oral BID    traZODone (DESYREL) tablet 100 mg  100 mg Oral QHS    glucose chewable tablet 16 g  4 Tablet Oral PRN    dextrose 10% infusion 0-250 mL  0-250 mL IntraVENous PRN    glucagon (GLUCAGEN) injection 1 mg  1 mg IntraMUSCular PRN    acetaminophen (TYLENOL) tablet 650 mg  650 mg Oral Q6H PRN    Or    acetaminophen (TYLENOL) suppository 650 mg  650 mg Rectal Q6H PRN    ondansetron (ZOFRAN ODT) tablet 4 mg  4 mg Oral Q8H PRN    Or    ondansetron (ZOFRAN) injection 4 mg  4 mg IntraVENous Q6H PRN    insulin lispro (HUMALOG) injection   SubCUTAneous AC&HS    0.9% sodium chloride infusion  75 mL/hr IntraVENous CONTINUOUS    sertraline (ZOLOFT) tablet 200 mg  200 mg Oral PCL    carvediloL (COREG) tablet 25 mg  25 mg Oral BID        Lab Data Reviewed: (see below)  Lab Review:     Recent Labs     05/28/22  0529 05/27/22  1048   WBC 15.8* 19.6*   HGB 9.2* 10.4*   HCT 28.1* 31.9*    175     Recent Labs     05/28/22  0529 05/27/22  1048    133*   K 3.9 4.3    99   CO2 24 26   * 201*   BUN 55* 59*   CREA 1.30 1.54*   CA 8.9 9.4   MG 2.4 2.3   ALB 2.4* 3.1*   TBILI 0.5 0.5   ALT 31 43     Lab Results   Component Value Date/Time    Glucose (POC) 392 (H) 05/29/2022 06:55 AM    Glucose (POC) 193 (H) 05/28/2022 09:10 PM    Glucose (POC) 295 (H) 05/28/2022 04:59 PM    Glucose (POC) 184 (H) 05/28/2022 12:09 PM    Glucose (POC) 178 (H) 05/28/2022 06:27 AM     No results for input(s): PH, PCO2, PO2, HCO3, FIO2 in the last 72 hours. No results for input(s): INR, INREXT, INREXT in the last 72 hours.   All Micro Results     Procedure Component Value Units Date/Time    CULTURE, BLOOD, PERIPHERAL [[id number]] Collected: 05/27/22 1236    Order Status: Completed Specimen: Blood Updated: 05/29/22 0555     Special Requests: NO SPECIAL REQUESTS        Culture result: NO GROWTH 2 DAYS       CULTURE, BLOOD, PERIPHERAL [[id number]] Collected: 05/27/22 1236    Order Status: Completed Specimen: Blood Updated: 05/29/22 0555     Special Requests: NO SPECIAL REQUESTS        Culture result: NO GROWTH 2 DAYS       CULTURE, MRSA [[id number]] Collected: 05/28/22 0529    Order Status: Completed Specimen: Nasal from Nares Updated: 05/28/22 1101    RESPIRATORY VIRUS PANEL W/COVID-19, PCR [[id number]]  (Abnormal) Collected: 05/28/22 0529    Order Status: Completed Specimen: Nasopharyngeal Updated: 05/28/22 1037     Adenovirus Not detected        Coronavirus 229E Not detected        Coronavirus HKU1 Not detected        Coronavirus CVNL63 Not detected        Coronavirus OC43 Not detected        SARS-CoV-2, PCR Not detected        Metapneumovirus Not detected        Rhinovirus and Enterovirus Not detected        Influenza A Not detected        Influenza A, subtype H1 Not detected        Influenza A, subtype H3 Not detected        INFLUENZA A H1N1 PCR Not detected        Influenza B Not detected        Parainfluenza 1 Not detected        Parainfluenza 2 Not detected        Parainfluenza 3 Not detected        Parainfluenza virus 4 Detected        RSV by PCR Not detected        B. parapertussis, PCR Not detected        Bordetella pertussis - PCR Not detected        Chlamydophila pneumoniae DNA, QL, PCR Not detected        Mycoplasma pneumoniae DNA, QL, PCR Not detected       COVID-19 RAPID TEST [[id number]] Collected: 05/27/22 1133    Order Status: Completed Specimen: Nasopharyngeal Updated: 05/27/22 1200     Specimen source Nasopharyngeal        COVID-19 rapid test Not detected        Comment: Rapid Abbott ID Now       Rapid NAAT:  The specimen is NEGATIVE for SARS-CoV-2, the novel coronavirus associated with COVID-19. Negative results should be treated as presumptive and, if inconsistent with clinical signs and symptoms or necessary for patient management, should be tested with an alternative molecular assay. Negative results do not preclude SARS-CoV-2 infection and should not be used as the sole basis for patient management decisions. This test has been authorized by the FDA under an Emergency Use Authorization (EUA) for use by authorized laboratories. Fact sheet for Healthcare Providers: ConventionUpdate.co.nz  Fact sheet for Patients: ConventionUpdate.co.nz       Methodology: Isothermal Nucleic Acid Amplification               Other pertinent lab: none    Total time spent with patient: 30 Minutes I personally reviewed chart, notes, data and current medications in the medical record. I have personally examined and treated the patient at bedside during this period.                  Care Plan discussed with: Patient, Family, Care Manager, Nursing Staff and >50% of time spent in counseling and coordination of care    Discussed:  Care Plan and D/C Planning    Prophylaxis:  H2B/PPI    Disposition:  Home w/Family           ___________________________________________________    Attending Physician: Josette Hanson MD

## 2022-05-29 NOTE — PROGRESS NOTES
5/29/2022  10:23 AM    Care Management Progress Note      ICD-10-CM ICD-9-CM    1. Aspiration pneumonia, unspecified aspiration pneumonia type, unspecified laterality, unspecified part of lung ([email or web address]  J69.0 507.0    2. Fever, unspecified fever cause  R50.9 780.60    3. Cough  R05.9 786.2        RUR:  20%  Risk Level: []Low []Moderate [x]High  Value-based purchasing: [] Yes [x] No  Bundle patient: [] Yes [x] No   Specify:     Transition of care plan:  1. Medically stable with discharge order  2. Home with family assistance - CM met with pt and family to ensure they feel patient has everything needed for discharge  3. Outpatient follow-up. 4. Pt's family to transport  5. No further CM needs identified    Medicare pt has received, reviewed, and signed 1st IM letter informing them of their right to appeal the discharge. Signed copied has been placed on pt bedside chart. Care Management Interventions  PCP Verified by CM:  Yes  Mode of Transport at Discharge: [address] (Family will transport in their wheelchair [address])  Transition of [address] (CM Consult): Discharge Planning  Discharge Durable Medical Equipment: No  Physical Therapy Consult: Yes  Occupational Therapy Consult: Yes  Speech Therapy Consult: No  Support Systems: Spouse/Significant Other,Other (Comment) ([place] who assists)  Confirm Follow Up Transport: Family  Discharge Location  Patient Expects to be Discharged to[de-identified] Home with family assistance    Heidi Collier RN

## 2022-05-29 NOTE — DISCHARGE INSTRUCTIONS
Patient Discharge Instructions    Claudia Jackson / [id number] : 1955    Admitted 2022 Discharged: 2022     Primary Diagnoses  Problem List as of 2022 Date Reviewed: 2022           Aspiration pneumonia ([facility]   Leukocytosis   ARF (acute renal failure) (HCC)   Dehydration   Nausea & vomiting   Elevated brain natriuretic peptide (BNP) level   AF (paroxysmal atrial fibrillation) ([facility]   DM type 2 causing vascular disease ([facility]   Dysphagia as late effect of stroke   Hx of completed stroke   Hypertension   PEG (percutaneous endoscopic gastrostomy) status ([facility]   Rheumatoid arthritis ([facility]          Take Home Medications     · It is important that you take the medication exactly as they are prescribed. · Keep your medication in the bottles provided by the pharmacist and keep a list of the medication names, dosages, and times to be taken in your wallet. · Do not take other medications without consulting your doctor. What to do at Home    Recommended diet: Cardiac Diet and PEG feeding as usual    Recommended activity: Activity as tolerated    If you experience worse symptoms, please follow up with your PCP. Follow-up with your PCP in a few weeks    Follow-up Information     Follow up With Specialties Details Why Contact Info    Jenny Mayo MD Family Medicine Schedule an appointment as soon as possible for a visit in 1 week  [place] [phone]             Information obtained by :  I understand that if any problems occur once I am at home I am to contact my physician. I understand and acknowledge receipt of the instructions indicated above.                                                                                                                                            Physician's or R.N.'s Signature                                                                  Date/Time Patient or Representative Signature                                                          Date/Time

## 2022-05-29 NOTE — PROGRESS NOTES
11: 00AM  I have reviewed discharge instructions with the patient and caregiver. The patient and caregiver verbalized understanding. PIV x 2 removed. Prescriptions given to pt. Pt safely transferred to his home wheelchair for discharge.

## 2022-05-30 LAB
GLUCOSE BLD STRIP.AUTO-MCNC: 204 MG/DL (ref 65–117)
SERVICE CMNT-IMP: ABNORMAL

## 2022-05-31 ENCOUNTER — PATIENT OUTREACH (OUTPATIENT)
Dept: CASE MANAGEMENT | Age: 67
End: 2022-05-31

## 2022-05-31 RX ORDER — CEFDINIR 125 MG/5ML
POWDER, FOR SUSPENSION ORAL 2 TIMES DAILY
COMMUNITY

## 2022-05-31 NOTE — PROGRESS NOTES
Care Transitions Initial Call    Call within 2 business days of discharge: Yes     Patient: Ishaan Castillo Patient : 1955 MRN: [id number]    Last Discharge [address]       Complaint Diagnosis Description Type Department Provider    22 Fever; Vomiting Aspiration pneumonia, unspecified aspiration pneumonia type, unspecified laterality, unspecified part of lung ([facility] [address] . .. ED to Hosp-Admission (Discharged) (ADMIT) [id number] Nuno Springer MD; Boo Mosqueda... Was this an external facility discharge? No   Challenges to be reviewed by the provider       CONTINUE these medications which have CHANGED     Details   furosemide (Lasix) 40 mg tablet Take 1 Tablet by mouth daily as needed for PRN Reason (Other) (fluid overload) for up to 30 days. Qty: 30 Tablet, Refills: 0                  Method of communication with provider : chart routing    Discussed COVID-19 related testing which was available at this time. Test results were negative. Patient informed of results, if available? yes     Advance Care Planning:   Does patient have an Advance Directive: patients   Inpatient Readmission Risk score: Unplanned Readmit Risk Score: 19.7 ( )    Was this a readmission? no       Patients top risk factors for readmission: functional physical ability, lack of knowledge about disease and multiple health system providers   Interventions to address risk factors: Obtained and reviewed discharge summary and/or continuity of care documents    Care Transition Nurse (CTN) contacted the family by telephone to perform post hospital discharge assessment. Verified name and  with family as identifiers. Provided introduction to self, and explanation of the CTN role. CTN reviewed discharge instructions, medical action plan and red flags with family who verbalized understanding. Were discharge instructions available to patient? yes.  Reviewed appropriate site of care based on symptoms and resources available to patient including: PCP, Specialist, Rusty Walls and When to call 911. Family given an opportunity to ask questions and does not have any further questions or concerns at this time. The family agrees to contact the PCP office for questions related to their healthcare. Medication reconciliation was performed with family, who verbalizes understanding of administration of home medications. Advised obtaining a 90-day supply of all daily and as-needed medications. Referral to Pharm D needed: no     Home Health/Outpatient orders at discharge: outpatient therapy at [address] [place] [address] ordered at discharge: n/a    Was patient discharged with a pulse oximeter? n/a    Discussed follow-up appointments. If no appointment was previously scheduled, appointment scheduling offered: yes. Is follow up appointment scheduled within 7 days of discharge? Wife will call Dr. Aditi Nova office and contact CTN with road blocks   [facility] Emmanuel Hernández follow up appointment(s): No future appointments. Non-[facility] follow up appointment(s): Wife will call Dr. Aditi Nova office and contact CTN with road blocks     Plan for follow-up call in 7-10 days based on severity of symptoms and risk factors. Plan for next call: symptom management-assess s/s  CTN provided contact information for future needs. Goals Addressed                 This Visit's Progress     Prevent complications post hospitalization. 05/31/22     - Spoke to patients wife with verbal permission from patient. - she reports patient get diarrhea from augmentin so she contacted Dr. Aditi Nova and he was switched to Cefdinir. Discussed monitoring stool for diarrhea and wife states she will do and contact MD if it occurs, also discussed using OTC probiotic and she will contact pharmacy and contact CTN with road blocks   - patient has had prior CVA.   Reviewed FAST with patient and signs of CVA, such as sudden numbness, tingling, loss of movement in your face, arm, leg, especially on one side of the body, vision changes, trouble speaking, confusion, walking or balance problems and sudden headache. - patient will take meds as prescribed  - wife was able to verbalize and understand aspiration precautions. She will monitor for cough after eating, and make sure patient sits up for 30 minutes after each Peg tube feeding. He is on osmolite 5x a day, bolus. - Reviewed red flag s/s with patient, nausea, vomiting, inability to pass urine/stool, SOB, mental status change, chest pain, fever.    - patient will continue OP therapy at [facility]  - wife will call PCP for appt and contact CTN with road blocks   Patient will contact Md/CTN if red flag s/s arise. CTN to f/u ~ 7-10 days. AR                   START taking these medications     Details   amoxicillin-clavulanate (AUGMENTIN) 400-57 mg/5 mL suspension 5 mL by Per G Tube route every twelve (12) hours for 5 days. Qty: 50 mL, Refills: 0               CONTINUE these medications which have CHANGED     Details   furosemide (Lasix) 40 mg tablet Take 1 Tablet by mouth daily as needed for PRN Reason (Other) (fluid overload) for up to 30 days.   Qty: 30 Tablet, Refills: 0

## 2022-06-02 LAB
BACTERIA SPEC CULT: NORMAL
BACTERIA SPEC CULT: NORMAL
SERVICE CMNT-IMP: NORMAL
SERVICE CMNT-IMP: NORMAL

## 2022-06-10 ENCOUNTER — PATIENT OUTREACH (OUTPATIENT)
Dept: CASE MANAGEMENT | Age: 67
End: 2022-06-10

## 2022-06-10 NOTE — PROGRESS NOTES
Care Transitions Follow Up Call    Challenges to be reviewed by the provider   n/a             Care Transition Nurse (CTN) contacted the family by telephone to follow up after admission on 2022 Verified name and  with family as identifiers. Follow up appointment completed? Wife stated she spoke to PCP via phone and they did not want to see him in person        CTN reviewed discharge instructions, medical action plan and red flags with family and discussed any barriers to care and/or understanding of plan of care after discharge. Discussed appropriate site of care based on symptoms and resources available to patient including: PCP, Specialist and When to call 911. The family agrees to contact the PCP office for questions related to their healthcare. [facility] follow up appointment(s): No future appointments. Non-[facility] follow up appointment(s): n/a    CTN provided contact information for future needs. Plan for follow-up call in 7-10 days based on severity of symptoms and risk factors. Goals Addressed                 This Visit's Progress     Prevent complications post hospitalization. 22     - Spoke to patients wife with verbal permission from patient. - she reports patient get diarrhea from augmentin so she contacted Dr. Aditi Nova and he was switched to Cefdinir. Discussed monitoring stool for diarrhea and wife states she will do and contact MD if it occurs, also discussed using OTC probiotic and she will contact pharmacy and contact CTN with road blocks   - patient has had prior CVA. Reviewed FAST with patient and signs of CVA, such as sudden numbness, tingling, loss of movement in your face, arm, leg, especially on one side of the body, vision changes, trouble speaking, confusion, walking or balance problems and sudden headache. - patient will take meds as prescribed  - wife was able to verbalize and understand aspiration precautions.  She will monitor for cough after eating, and make sure patient sits up for 30 minutes after each Peg tube feeding. He is on osmolite 5x a day, bolus. - Reviewed red flag s/s with patient, nausea, vomiting, inability to pass urine/stool, SOB, mental status change, chest pain, fever.    - patient will continue OP therapy at [facility]  - wife will call PCP for appt and contact CTN with road blocks   Patient will contact Md/CTN if red flag s/s arise. CTN to f/u ~ 7-10 days. AR       06/10/22  - patient has 1 more day of antibiotics. Wife reports no diarrhea and BM's are WNL  - patient to continue participating in OP PT at [facility]  - Peg tube feedings are going WNL, no coughing, fever, signs of PNA   - no adverse red flag s/s   - patient will continue to take meds as prescribed   - wife will continue to monitor for aspiration PNA       Patient will contact Md/CTN if red flag s/s arise. CTN to f/u ~ 7-10 days.  AR

## 2022-06-24 ENCOUNTER — PATIENT OUTREACH (OUTPATIENT)
Dept: CASE MANAGEMENT | Age: 67
End: 2022-06-24

## 2022-06-24 NOTE — PROGRESS NOTES
Care Transitions Follow Up Call    Challenges to be reviewed by the provider   n/a             Care Transition Nurse (CTN) contacted the family by telephone to follow up after admission on 2022. Verified name and  with family as identifiers. Follow up appointment completed? yes. Was follow up appointment scheduled within 7 days of discharge? yes. CTN reviewed discharge instructions, medical action plan and red flags with family and discussed any barriers to care and/or understanding of plan of care after discharge. Discussed appropriate site of care based on symptoms and resources available to patient including: PCP and When to call 911. The family agrees to contact the PCP office for questions related to their healthcare. [facility] [place] [place](s): No future appointments. Non-[facility] follow up appointment(s): cardiology TBD    CTN provided contact information for future needs. Plan for follow-up call in 7-10 days based on severity of symptoms and risk factors. Plan for next call: symptom management-assess s/s      Goals Addressed                 This Visit's Progress     Prevent complications post hospitalization. 22     - Spoke to patients wife with verbal permission from patient. - she reports patient get diarrhea from augmentin so she contacted Dr. Jonh Ness and he was switched to Cefdinir. Discussed monitoring stool for diarrhea and wife states she will do and contact MD if it occurs, also discussed using OTC probiotic and she will contact pharmacy and contact CTN with road blocks   - patient has had prior CVA. Reviewed FAST with patient and signs of CVA, such as sudden numbness, tingling, loss of movement in your face, arm, leg, especially on one side of the body, vision changes, trouble speaking, confusion, walking or balance problems and sudden headache. - patient will take meds as prescribed  - wife was able to verbalize and understand aspiration precautions.  She will monitor for cough after eating, and make sure patient sits up for 30 minutes after each Peg tube feeding. He is on osmolite 5x a day, bolus. - Reviewed red flag s/s with patient, nausea, vomiting, inability to pass urine/stool, SOB, mental status change, chest pain, fever.    - patient will continue OP therapy at [facility]  - wife will call PCP for appt and contact CTN with road blocks   Patient will contact Md/CTN if red flag s/s arise. CTN to f/u ~ 7-10 days. AR       06/10/22  - patient has 1 more day of antibiotics. Wife reports no diarrhea and BM's are WNL  - patient to continue participating in OP PT at [facility]  - Peg tube feedings are going WNL, no coughing, fever, signs of PNA   - no adverse red flag s/s   - patient will continue to take meds as prescribed   - wife will continue to monitor for aspiration PNA       Patient will contact Md/CTN if red flag s/s arise. CTN to f/u ~ 7-10 days. AR       06/24/22  - [facility], wife reports that the procedure went very well. And she will monitor for redness, warmth, swelling or drainage  - patients wife will continue to report any signs of aspiration PNA to MD or CTN and she verbalizes she is able to recognize the signs. - she reports BM's are normal     Patient will contact Md/CTN if red flag s/s arise. CTN to f/u ~ 7-10 days.  AR

## 2022-06-26 PROBLEM — E86.0 DEHYDRATION: Status: RESOLVED | Noted: 2022-05-27 | Resolved: 2022-06-26

## 2022-07-07 ENCOUNTER — PATIENT OUTREACH (OUTPATIENT)
Dept: CASE MANAGEMENT | Age: 67
End: 2022-07-07

## 2022-07-07 NOTE — PROGRESS NOTES
Patient has graduated from the Transitions of Care Coordination  program on 07/07/22  . Patient/family has the ability to self-manage at this time Care management goals have been completed. Patient was not referred to the [facility] team for further management. Goals Addressed                 This Visit's Progress     COMPLETED: Prevent complications post hospitalization. 05/31/22     - Spoke to patients wife with verbal permission from patient. - she reports patient get diarrhea from augmentin so she contacted Dr. Ben Henry and he was switched to Cefdinir. Discussed monitoring stool for diarrhea and wife states she will do and contact MD if it occurs, also discussed using OTC probiotic and she will contact pharmacy and contact CTN with road blocks   - patient has had prior CVA. Reviewed FAST with patient and signs of CVA, such as sudden numbness, tingling, loss of movement in your face, arm, leg, especially on one side of the body, vision changes, trouble speaking, confusion, walking or balance problems and sudden headache. - patient will take meds as prescribed  - wife was able to verbalize and understand aspiration precautions. She will monitor for cough after eating, and make sure patient sits up for 30 minutes after each Peg tube feeding. He is on osmolite 5x a day, bolus. - Reviewed red flag s/s with patient, nausea, vomiting, inability to pass urine/stool, SOB, mental status change, chest pain, fever.    - patient will continue OP therapy at [facility]  - wife will call PCP for appt and contact CTN with road blocks   Patient will contact Md/CTN if red flag s/s arise. CTN to f/u ~ 7-10 days. AR       06/10/22  - patient has 1 more day of antibiotics.  Wife reports no diarrhea and BM's are WNL  - patient to continue participating in OP PT at [facility]  - Peg tube feedings are going WNL, no coughing, fever, signs of PNA   - no adverse red flag s/s   - patient will continue to take meds as prescribed   - wife will continue to monitor for aspiration PNA       Patient will contact Md/CTN if red flag s/s arise. CTN to f/u ~ 7-10 days. AR       06/24/22  - patient had watchman device placed [facility] today, wife reports that the procedure went very well. And she will monitor for redness, warmth, swelling or drainage  - patients wife will continue to report any signs of aspiration PNA to MD or CTN and she verbalizes she is able to recognize the signs. - she reports BM's are normal     Patient will contact Md/CTN if red flag s/s arise. CTN to f/u ~ 7-10 days. AR       07/07/22  Patients wife reports that patient is good. He will have cardiology follow up next week. No s/s of PNA. Antibiotics have been finished. She reported on questions or concerns. AR            Patient has Care Transition Nurse's contact information for any further questions, concerns, or needs. Patients upcoming visits:  No future appointments.

## 2022-07-25 ENCOUNTER — HOSPITAL ENCOUNTER (EMERGENCY)
Age: 67
Discharge: HOME | End: 2022-07-25
Attending: EMERGENCY MEDICINE
Payer: MEDICARE

## 2022-07-25 ENCOUNTER — APPOINTMENT (OUTPATIENT)
Dept: GENERAL RADIOLOGY | Age: 67
End: 2022-07-25
Attending: EMERGENCY MEDICINE
Payer: MEDICARE

## 2022-07-25 VITALS
HEIGHT: 71 IN | HEART RATE: 84 BPM | RESPIRATION RATE: 16 BRPM | WEIGHT: 150 LBS | TEMPERATURE: 98.9 F | SYSTOLIC BLOOD PRESSURE: 112 MMHG | BODY MASS INDEX: 21 KG/M2 | DIASTOLIC BLOOD PRESSURE: 67 MMHG | OXYGEN SATURATION: 97 %

## 2022-07-25 DIAGNOSIS — N30.00 ACUTE CYSTITIS WITHOUT HEMATURIA: Primary | ICD-10-CM

## 2022-07-25 LAB
ALBUMIN SERPL-MCNC: 3 G/DL (ref 3.5–5)
ALBUMIN/GLOB SERPL: 0.7 {RATIO} (ref 1.1–2.2)
ALP SERPL-CCNC: 165 U/L (ref 45–117)
ALT SERPL-CCNC: 36 U/L (ref 12–78)
ANION GAP SERPL CALC-SCNC: 7 MMOL/L (ref 5–15)
APPEARANCE UR: ABNORMAL
AST SERPL-CCNC: 20 U/L (ref 15–37)
BACTERIA URNS QL MICRO: ABNORMAL /HPF
BASOPHILS # BLD: 0 K/UL (ref 0–0.1)
BASOPHILS NFR BLD: 0 % (ref 0–1)
BILIRUB SERPL-MCNC: 0.3 MG/DL (ref 0.2–1)
BILIRUB UR QL: NEGATIVE
BUN SERPL-MCNC: 57 MG/DL (ref 6–20)
BUN/CREAT SERPL: 37 (ref 12–20)
CALCIUM SERPL-MCNC: 9.2 MG/DL (ref 8.5–10.1)
CHLORIDE SERPL-SCNC: 97 MMOL/L (ref 97–108)
CO2 SERPL-SCNC: 28 MMOL/L (ref 21–32)
COLOR UR: ABNORMAL
CREAT SERPL-MCNC: 1.56 MG/DL (ref 0.7–1.3)
DIFFERENTIAL METHOD BLD: ABNORMAL
EOSINOPHIL # BLD: 0.1 K/UL (ref 0–0.4)
EOSINOPHIL NFR BLD: 1 % (ref 0–7)
EPITH CASTS URNS QL MICRO: ABNORMAL /LPF
ERYTHROCYTE [DISTWIDTH] IN BLOOD BY AUTOMATED COUNT: 15.6 % (ref 11.5–14.5)
GLOBULIN SER CALC-MCNC: 4.6 G/DL (ref 2–4)
GLUCOSE SERPL-MCNC: 149 MG/DL (ref 65–100)
GLUCOSE UR STRIP.AUTO-MCNC: NEGATIVE MG/DL
HCT VFR BLD AUTO: 30.5 % (ref 36.6–50.3)
HGB BLD-MCNC: 9.6 G/DL (ref 12.1–17)
HGB UR QL STRIP: ABNORMAL
IMM GRANULOCYTES # BLD AUTO: 0.1 K/UL (ref 0–0.04)
IMM GRANULOCYTES NFR BLD AUTO: 0 % (ref 0–0.5)
KETONES UR QL STRIP.AUTO: NEGATIVE MG/DL
LACTATE BLD-SCNC: 1.02 MMOL/L (ref 0.4–2)
LEUKOCYTE ESTERASE UR QL STRIP.AUTO: ABNORMAL
LYMPHOCYTES # BLD: 2 K/UL (ref 0.8–3.5)
LYMPHOCYTES NFR BLD: 15 % (ref 12–49)
MCH RBC QN AUTO: 27.7 PG (ref 26–34)
MCHC RBC AUTO-ENTMCNC: 31.5 G/DL (ref 30–36.5)
MCV RBC AUTO: 88.2 FL (ref 80–99)
MONOCYTES # BLD: 0.9 K/UL (ref 0–1)
MONOCYTES NFR BLD: 6 % (ref 5–13)
NEUTS SEG # BLD: 10.6 K/UL (ref 1.8–8)
NEUTS SEG NFR BLD: 77 % (ref 32–75)
NITRITE UR QL STRIP.AUTO: POSITIVE
NRBC # BLD: 0 K/UL (ref 0–0.01)
NRBC BLD-RTO: 0 PER 100 WBC
PH UR STRIP: 6 [PH] (ref 5–8)
PLATELET # BLD AUTO: 167 K/UL (ref 150–400)
PMV BLD AUTO: 13 FL (ref 8.9–12.9)
POTASSIUM SERPL-SCNC: 4.6 MMOL/L (ref 3.5–5.1)
PROT SERPL-MCNC: 7.6 G/DL (ref 6.4–8.2)
PROT UR STRIP-MCNC: NEGATIVE MG/DL
RBC # BLD AUTO: 3.46 M/UL (ref 4.1–5.7)
RBC #/AREA URNS HPF: ABNORMAL /HPF (ref 0–5)
SODIUM SERPL-SCNC: 132 MMOL/L (ref 136–145)
SP GR UR REFRACTOMETRY: 1.01 (ref 1–1.03)
UA: UC IF INDICATED,UAUC: ABNORMAL
UROBILINOGEN UR QL STRIP.AUTO: 0.2 EU/DL (ref 0.2–1)
WBC # BLD AUTO: 13.7 K/UL (ref 4.1–11.1)
WBC URNS QL MICRO: >100 /HPF (ref 0–4)

## 2022-07-25 PROCEDURE — 99284 EMERGENCY DEPT VISIT MOD MDM: CPT

## 2022-07-25 PROCEDURE — 87086 URINE CULTURE/COLONY COUNT: CPT

## 2022-07-25 PROCEDURE — 87040 BLOOD CULTURE FOR BACTERIA: CPT

## 2022-07-25 PROCEDURE — 85025 COMPLETE CBC W/AUTO DIFF WBC: CPT

## 2022-07-25 PROCEDURE — 80053 COMPREHEN METABOLIC PANEL: CPT

## 2022-07-25 PROCEDURE — 36415 COLL VENOUS BLD VENIPUNCTURE: CPT

## 2022-07-25 PROCEDURE — 87186 SC STD MICRODIL/AGAR DIL: CPT

## 2022-07-25 PROCEDURE — 74011250637 HC RX REV CODE- 250/637: Performed by: EMERGENCY MEDICINE

## 2022-07-25 PROCEDURE — 87077 CULTURE AEROBIC IDENTIFY: CPT

## 2022-07-25 PROCEDURE — 71045 X-RAY EXAM CHEST 1 VIEW: CPT

## 2022-07-25 PROCEDURE — 83605 ASSAY OF LACTIC ACID: CPT

## 2022-07-25 PROCEDURE — 81001 URINALYSIS AUTO W/SCOPE: CPT

## 2022-07-25 RX ORDER — CEPHALEXIN 250 MG/5ML
10 POWDER, FOR SUSPENSION ORAL EVERY 12 HOURS
Qty: 100 ML | Refills: 0 | Status: SHIPPED | OUTPATIENT
Start: 2022-07-25 | End: 2022-07-30

## 2022-07-25 RX ORDER — SODIUM CHLORIDE 0.9 % (FLUSH) 0.9 %
5-10 SYRINGE (ML) INJECTION AS NEEDED
Status: DISCONTINUED | OUTPATIENT
Start: 2022-07-25 | End: 2022-07-25 | Stop reason: HOSPADM

## 2022-07-25 RX ORDER — CEPHALEXIN 125 MG/5ML
500 POWDER, FOR SUSPENSION ORAL
Status: COMPLETED | OUTPATIENT
Start: 2022-07-25 | End: 2022-07-25

## 2022-07-25 RX ADMIN — CEPHALEXIN 500 MG: 125 FOR SUSPENSION ORAL at 18:14

## 2022-07-25 NOTE — ED NOTES
The patient was discharged home by Dr Andrade Kearns in stable condition. The patient is alert and oriented, in no respiratory distress. The patient's diagnosis, condition and treatment were explained. The patient expressed understanding. A discharge plan has been developed. A  was not involved in the process. Aftercare instructions were given. Pt discharged from the ED via w/c by his wife.

## 2022-07-25 NOTE — ED PROVIDER NOTES
The history is provided by the spouse. No  was used. Vomiting   This is a new problem. The current episode started yesterday. The problem occurs 2 to 4 times per day. The problem has not changed since onset. The emesis has an appearance of stomach contents. There has been no fever. Pertinent negatives include no sweats and no URI. Past Medical History:   Diagnosis Date    AF (paroxysmal atrial fibrillation) (HCC)     Anxiety and depression     Diabetes ([facility] [address]     Dysphagia as late effect of stroke     Hx of completed stroke     Hyperlipidemia     Hypertension     Hypothyroid     PEG (percutaneous endoscopic gastrostomy) status ([facility] [address]     Rheumatoid arthritis ([facility]        Past Surgical History:   Procedure Laterality Date    HX AMPUTATION Left     left 5 toes    HX HERNIA REPAIR           History reviewed. No pertinent family history. Social History     Socioeconomic History    Marital status:      Spouse name: Not on file    Number of children: Not on file    Years of education: Not on file    Highest education level: Not on file   Occupational History    Not on file   Tobacco Use    Smoking status: Former    Smokeless tobacco: Never   Vaping Use    Vaping Use: Never used   Substance and Sexual Activity    Alcohol use: Never    Drug use: Yes     Types: Marijuana    Sexual activity: Not on file   Other Topics Concern    Not on file   Social History Narrative    Not on file     Social Determinants of Health     Financial Resource Strain: Not on file   Food Insecurity: Not on file   Transportation Needs: Not on file   Physical Activity: Not on file   Stress: Not on file   Social Connections: Not on file   Intimate Partner Violence: Not on file   Housing Stability: Not on file         ALLERGIES: Patient has no known allergies. Review of Systems   Unable to perform ROS: Patient nonverbal   Gastrointestinal:  Positive for vomiting.      Vitals:    07/25/22 1554   BP: 112/67   Pulse: 84 Resp: 16   Temp: 98.9 °F (37.2 °C)   SpO2: 97%   Weight: 68 kg (150 lb)   Height: 5' 11\" (1.803 m)            Physical Exam  Vitals and nursing note reviewed. Constitutional:       General: He is not in acute distress. Appearance: He is well-developed. He is not diaphoretic. HENT:      Head: Atraumatic. Neck:      Trachea: No tracheal deviation. Cardiovascular:      Comments: Warm and well perfused  Pulmonary:      Effort: Pulmonary effort is normal. No respiratory distress. Abdominal:      Tenderness: There is no abdominal tenderness. Musculoskeletal:         General: Normal range of motion. Skin:     General: Skin is warm and dry. Neurological:      Mental Status: He is alert. Mental status is at baseline. Coordination: Coordination normal.        MDM    This is a 49-year-old male with past medical history, review of systems, physical exam as above, presenting with complaints of emesis, cough. Wife at bedside states patient with a history of CVA, G-tube dependent, with a history of aspiration had several episodes of emesis yesterday. She states it may be an exacerbation of his gastroparesis. Wife states his bowel movements have been normal over the weekend, small deviations in his temperature and oxygen saturations, with mild cough, without signs of respiratory distress. She states his last episode of emesis was earlier today. Physical exam is remarkable for chronically ill-appearing elderly male, in no acute distress noted to be normotensive, afebrile without tachycardia, satting well on room air. He has clear breath sounds to auscultation, regular rate and rhythm without murmurs gallops or rubs, G-tube present, soft nontender abdomen. Given concern for aspiration, plan to obtain CMP, CBC, chest x-ray, lactic acid, blood cultures. Patient denies nausea currently, thus will withhold antiemetics, reassess, and make a disposition.     Procedures

## 2022-07-25 NOTE — ED TRIAGE NOTES
Per wife pt started having vomiting x 2 yesterday and 3 x today, increased fatigue, +cough (productive off white per wife) wife also states pts normal temp 94-95 and today temp 97-98.c/o aches and given tylenol this am and pt vomited. Pt is NPO fed via Peg; wife states everytime she used PEG today pt would vomit the contents of what was placed in the peg. Pt denied pain at this time, Condom cath in place.

## 2022-07-28 LAB
BACTERIA SPEC CULT: ABNORMAL
CC UR VC: ABNORMAL
SERVICE CMNT-IMP: ABNORMAL

## 2022-07-28 RX ORDER — CIPROFLOXACIN 500 MG/1
500 TABLET ORAL 2 TIMES DAILY
Qty: 14 TABLET | Refills: 0 | Status: SHIPPED | OUTPATIENT
Start: 2022-07-28 | End: 2022-08-04

## 2022-07-28 NOTE — PROGRESS NOTES
Pt wife returned call. We discussed urine culture results. Informed to stop keflex. Treatment options discussed. They are aware of tendon injury with cipro.   [address] for cipro 500 mg bid x 7 d to [employer]

## 2022-08-11 ENCOUNTER — HOSPITAL ENCOUNTER (OUTPATIENT)
Dept: NON INVASIVE DIAGNOSTICS | Age: 67
Discharge: HOME | End: 2022-08-11
Attending: STUDENT IN AN ORGANIZED HEALTH CARE EDUCATION/TRAINING PROGRAM

## 2022-08-11 ENCOUNTER — APPOINTMENT (OUTPATIENT)
Dept: GENERAL RADIOLOGY | Age: 67
End: 2022-08-11
Attending: EMERGENCY MEDICINE
Payer: MEDICARE

## 2022-08-11 ENCOUNTER — HOSPITAL ENCOUNTER (EMERGENCY)
Age: 67
Discharge: HOME | End: 2022-08-11
Attending: EMERGENCY MEDICINE
Payer: MEDICARE

## 2022-08-11 VITALS
HEART RATE: 80 BPM | BODY MASS INDEX: 21 KG/M2 | HEIGHT: 71 IN | WEIGHT: 150 LBS | TEMPERATURE: 98 F | RESPIRATION RATE: 14 BRPM | DIASTOLIC BLOOD PRESSURE: 75 MMHG | OXYGEN SATURATION: 97 % | SYSTOLIC BLOOD PRESSURE: 145 MMHG

## 2022-08-11 DIAGNOSIS — R50.9 FEVER, UNSPECIFIED FEVER CAUSE: ICD-10-CM

## 2022-08-11 DIAGNOSIS — J18.9 PNEUMONIA DUE TO INFECTIOUS ORGANISM, UNSPECIFIED LATERALITY, UNSPECIFIED PART OF LUNG: Primary | ICD-10-CM

## 2022-08-11 LAB
ALBUMIN SERPL-MCNC: 3.2 G/DL (ref 3.5–5)
ALBUMIN/GLOB SERPL: 0.7 {RATIO} (ref 1.1–2.2)
ALP SERPL-CCNC: 225 U/L (ref 45–117)
ALT SERPL-CCNC: 35 U/L (ref 12–78)
ANION GAP SERPL CALC-SCNC: 8 MMOL/L (ref 5–15)
APPEARANCE UR: ABNORMAL
AST SERPL-CCNC: 26 U/L (ref 15–37)
BACTERIA URNS QL MICRO: NEGATIVE /HPF
BASOPHILS # BLD: 0.1 K/UL (ref 0–0.1)
BASOPHILS NFR BLD: 1 % (ref 0–1)
BILIRUB SERPL-MCNC: 0.3 MG/DL (ref 0.2–1)
BILIRUB UR QL: NEGATIVE
BUN SERPL-MCNC: 50 MG/DL (ref 6–20)
BUN/CREAT SERPL: 36 (ref 12–20)
CALCIUM SERPL-MCNC: 9.3 MG/DL (ref 8.5–10.1)
CHLORIDE SERPL-SCNC: 100 MMOL/L (ref 97–108)
CO2 SERPL-SCNC: 28 MMOL/L (ref 21–32)
COLOR UR: ABNORMAL
CREAT SERPL-MCNC: 1.37 MG/DL (ref 0.7–1.3)
DIFFERENTIAL METHOD BLD: ABNORMAL
EOSINOPHIL # BLD: 0.2 K/UL (ref 0–0.4)
EOSINOPHIL NFR BLD: 4 % (ref 0–7)
EPITH CASTS URNS QL MICRO: ABNORMAL /LPF
ERYTHROCYTE [DISTWIDTH] IN BLOOD BY AUTOMATED COUNT: 15.8 % (ref 11.5–14.5)
GLOBULIN SER CALC-MCNC: 4.5 G/DL (ref 2–4)
GLUCOSE SERPL-MCNC: 176 MG/DL (ref 65–100)
GLUCOSE UR STRIP.AUTO-MCNC: NEGATIVE MG/DL
HCT VFR BLD AUTO: 31.4 % (ref 36.6–50.3)
HGB BLD-MCNC: 10.1 G/DL (ref 12.1–17)
HGB UR QL STRIP: ABNORMAL
IMM GRANULOCYTES # BLD AUTO: 0.1 K/UL (ref 0–0.04)
IMM GRANULOCYTES NFR BLD AUTO: 1 % (ref 0–0.5)
KETONES UR QL STRIP.AUTO: NEGATIVE MG/DL
LACTATE BLD-SCNC: 0.99 MMOL/L (ref 0.4–2)
LEUKOCYTE ESTERASE UR QL STRIP.AUTO: NEGATIVE
LYMPHOCYTES # BLD: 1.5 K/UL (ref 0.8–3.5)
LYMPHOCYTES NFR BLD: 25 % (ref 12–49)
MCH RBC QN AUTO: 28 PG (ref 26–34)
MCHC RBC AUTO-ENTMCNC: 32.2 G/DL (ref 30–36.5)
MCV RBC AUTO: 87 FL (ref 80–99)
MONOCYTES # BLD: 0.4 K/UL (ref 0–1)
MONOCYTES NFR BLD: 7 % (ref 5–13)
NEUTS SEG # BLD: 3.6 K/UL (ref 1.8–8)
NEUTS SEG NFR BLD: 62 % (ref 32–75)
NITRITE UR QL STRIP.AUTO: NEGATIVE
NRBC # BLD: 0 K/UL (ref 0–0.01)
NRBC BLD-RTO: 0 PER 100 WBC
PH UR STRIP: 8 [PH] (ref 5–8)
PLATELET # BLD AUTO: 259 K/UL (ref 150–400)
PMV BLD AUTO: 12.3 FL (ref 8.9–12.9)
POTASSIUM SERPL-SCNC: 4.2 MMOL/L (ref 3.5–5.1)
PROT SERPL-MCNC: 7.7 G/DL (ref 6.4–8.2)
PROT UR STRIP-MCNC: ABNORMAL MG/DL
RBC # BLD AUTO: 3.61 M/UL (ref 4.1–5.7)
RBC #/AREA URNS HPF: ABNORMAL /HPF (ref 0–5)
RBC MORPH BLD: ABNORMAL
RBC MORPH BLD: ABNORMAL
SODIUM SERPL-SCNC: 136 MMOL/L (ref 136–145)
SP GR UR REFRACTOMETRY: 1.01 (ref 1–1.03)
UROBILINOGEN UR QL STRIP.AUTO: 0.2 EU/DL (ref 0.2–1)
WBC # BLD AUTO: 5.9 K/UL (ref 4.1–11.1)
WBC URNS QL MICRO: ABNORMAL /HPF (ref 0–4)

## 2022-08-11 PROCEDURE — 36415 COLL VENOUS BLD VENIPUNCTURE: CPT

## 2022-08-11 PROCEDURE — 71045 X-RAY EXAM CHEST 1 VIEW: CPT

## 2022-08-11 PROCEDURE — 99284 EMERGENCY DEPT VISIT MOD MDM: CPT

## 2022-08-11 PROCEDURE — 83605 ASSAY OF LACTIC ACID: CPT

## 2022-08-11 PROCEDURE — 74011000250 HC RX REV CODE- 250: Performed by: EMERGENCY MEDICINE

## 2022-08-11 PROCEDURE — 96374 THER/PROPH/DIAG INJ IV PUSH: CPT

## 2022-08-11 PROCEDURE — 87086 URINE CULTURE/COLONY COUNT: CPT

## 2022-08-11 PROCEDURE — 81001 URINALYSIS AUTO W/SCOPE: CPT

## 2022-08-11 PROCEDURE — 85025 COMPLETE CBC W/AUTO DIFF WBC: CPT

## 2022-08-11 PROCEDURE — 80053 COMPREHEN METABOLIC PANEL: CPT

## 2022-08-11 PROCEDURE — 74011250636 HC RX REV CODE- 250/636: Performed by: EMERGENCY MEDICINE

## 2022-08-11 PROCEDURE — 87040 BLOOD CULTURE FOR BACTERIA: CPT

## 2022-08-11 RX ORDER — CEFDINIR 250 MG/5ML
300 POWDER, FOR SUSPENSION ORAL 2 TIMES DAILY
Qty: 120 ML | Refills: 0 | Status: SHIPPED | OUTPATIENT
Start: 2022-08-11 | End: 2022-08-21

## 2022-08-11 RX ORDER — CEFDINIR 250 MG/5ML
300 POWDER, FOR SUSPENSION ORAL 2 TIMES DAILY
Qty: 120 ML | Refills: 0 | Status: SHIPPED | OUTPATIENT
Start: 2022-08-11 | End: 2022-08-11

## 2022-08-11 RX ADMIN — SODIUM CHLORIDE 1 G: 9 INJECTION INTRAMUSCULAR; INTRAVENOUS; SUBCUTANEOUS at 13:16

## 2022-08-11 NOTE — ED PROVIDER NOTES
HPI       73y M with hx of CVA, g-tube, recurrent aspiration here with fever of 100 at home and coughing up tan/green sputum. Has had white sputum in the past few days. Sats at home 94% on RA. No vomiting. No complaints of pain. Temp is taken every day by wife and usually around 97-98 degrees. Pt is not able to provide history due to some aphasia from CVA. Past Medical History:   Diagnosis Date    AF (paroxysmal atrial fibrillation) (HCC)     Anxiety and depression     Diabetes ([facility] [address]     Dysphagia as late effect of stroke     Hx of completed stroke     Hyperlipidemia     Hypertension     Hypothyroid     PEG (percutaneous endoscopic gastrostomy) status ([facility] [address]     Rheumatoid arthritis ([facility] [address]        Past Surgical History:   Procedure Laterality Date    HX AMPUTATION Left     left 5 toes    HX HERNIA REPAIR           No family history on file. Social History     Socioeconomic History    Marital status:      Spouse name: Not on file    Number of children: Not on file    Years of education: Not on file    Highest education level: Not on file   Occupational History    Not on file   Tobacco Use    Smoking status: Former    Smokeless tobacco: Never   Vaping Use    Vaping Use: Never used   Substance and Sexual Activity    Alcohol use: Never    Drug use: Yes     Types: Marijuana    Sexual activity: Not on file   Other Topics Concern    Not on file   Social History Narrative    Not on file     Social Determinants of Health     Financial Resource Strain: Not on file   Food Insecurity: Not on file   Transportation Needs: Not on file   Physical Activity: Not on file   Stress: Not on file   Social Connections: Not on file   Intimate Partner Violence: Not on file   Housing Stability: Not on file         ALLERGIES: Patient has no known allergies. Review of Systems  See hpi, otherwise not able to obtain due to pt's non-verbal status    There were no vitals filed for this visit.          Physical Exam Nursing note and vitals reviewed. Constitutional: awake and alert. Appears chronically ill. No distress. Head: Normocephalic and atraumatic. Sclera anicteric  Nose: No rhinorrhea  Mouth/Throat: Oropharynx is clear and moist. Pharynx normal  Eyes: Conjunctivae are normal. Pupils are equal, round, and reactive to light. Right eye exhibits no discharge. Left eye exhibits no discharge. Neck: Painless normal range of motion. Neck supple. No LAD. Cardiovascular: Normal rate, regular rhythm, normal heart sounds and intact distal pulses. Exam reveals no gallop and no friction rub. No murmur heard. Pulmonary/Chest:  No respiratory distress. No wheezes. No rales. No rhonchi. No increased work of breathing. No accessory muscle use. Good air exchange throughout. Abdominal: soft, non-tender, no rebound or guarding. No hepatosplenomegaly. Normal bowel sounds throughout. Back: no tenderness to palpation, no deformities, no CVA tenderness  Extremities/Musculoskeletal: Normal range of motion. no tenderness. No edema. Distal extremities are neurovasc intact. Lymphadenopathy:   No adenopathy. Neurological:  awake and alert. Can nod \"yes\" and \"no\" appropriately to questions. Arms and legs contracted. Skin: Skin is warm and dry. No rash noted. No pallor. MDM  73y M here with low grade temp and change in color of sputum. Will need labs (inc cultures and lactate), CXR. Appears chronically ill but not in any distress. Procedures    12:43 PM  Possible pneumonia on CXR. WBC and lactate ok. He appears well. Discussed with wife. Was going to put on augmentin but he has lots of diarrhea with this. Has done well in the past with omnicef when he's gotten pneumonia so will cover with this. Waiting on UA but hopefully can let him go home once this is back.

## 2022-08-11 NOTE — ED TRIAGE NOTES
Patient's wife brought the patient in for a \"gunky cough\" since last night. \"Usually the sputum is white, and now it is green. His temperature was 100.3 on his forehead this morning. \" Patient is wheelchair bound, non-ambulatory, has a feeding tube, and a condom catheter. He can say some words. It took 4 staff members and the wife to lift him from the wheelchair to the stretcher.  Wife requests a radha lift, but we do not have one at this facility

## 2022-08-12 LAB
BACTERIA SPEC CULT: NORMAL
CC UR VC: NORMAL
SERVICE CMNT-IMP: NORMAL

## 2022-08-17 LAB
BACTERIA SPEC CULT: NORMAL
SERVICE CMNT-IMP: NORMAL

## 2022-09-22 ENCOUNTER — ANESTHESIA (OUTPATIENT)
Dept: NON INVASIVE DIAGNOSTICS | Age: 67
End: 2022-09-22
Payer: MEDICARE

## 2022-09-22 ENCOUNTER — HOSPITAL ENCOUNTER (OUTPATIENT)
Dept: NON INVASIVE DIAGNOSTICS | Age: 67
Discharge: HOME | End: 2022-09-22
Attending: INTERNAL MEDICINE
Payer: MEDICARE

## 2022-09-22 ENCOUNTER — ANESTHESIA EVENT (OUTPATIENT)
Dept: NON INVASIVE DIAGNOSTICS | Age: 67
End: 2022-09-22
Payer: MEDICARE

## 2022-09-22 VITALS
DIASTOLIC BLOOD PRESSURE: 90 MMHG | OXYGEN SATURATION: 95 % | TEMPERATURE: 98.2 F | WEIGHT: 139 LBS | HEART RATE: 92 BPM | RESPIRATION RATE: 22 BRPM | SYSTOLIC BLOOD PRESSURE: 146 MMHG | BODY MASS INDEX: 19.46 KG/M2 | HEIGHT: 71 IN

## 2022-09-22 DIAGNOSIS — I48.19 ATRIAL FIBRILLATION, PERSISTENT (HCC): ICD-10-CM

## 2022-09-22 PROCEDURE — 74011250636 HC RX REV CODE- 250/636: Performed by: NURSE ANESTHETIST, CERTIFIED REGISTERED

## 2022-09-22 PROCEDURE — 74011000250 HC RX REV CODE- 250: Performed by: NURSE ANESTHETIST, CERTIFIED REGISTERED

## 2022-09-22 PROCEDURE — 93325 DOPPLER ECHO COLOR FLOW MAPG: CPT

## 2022-09-22 PROCEDURE — 76060000031 HC ANESTHESIA FIRST 0.5 HR

## 2022-09-22 RX ORDER — LIDOCAINE HYDROCHLORIDE 20 MG/ML
INJECTION, SOLUTION EPIDURAL; INFILTRATION; INTRACAUDAL; PERINEURAL AS NEEDED
Status: DISCONTINUED | OUTPATIENT
Start: 2022-09-22 | End: 2022-09-22 | Stop reason: HOSPADM

## 2022-09-22 RX ORDER — FUROSEMIDE 40 MG/1
40 TABLET ORAL DAILY
COMMUNITY

## 2022-09-22 RX ORDER — INSULIN LISPRO 100 [IU]/ML
4 INJECTION, SOLUTION INTRAVENOUS; SUBCUTANEOUS
COMMUNITY

## 2022-09-22 RX ORDER — GUAIFENESIN 100 MG/5ML
81 LIQUID (ML) ORAL DAILY
Qty: 30 TABLET | Refills: 5 | Status: SHIPPED | OUTPATIENT
Start: 2022-09-22

## 2022-09-22 RX ORDER — CLOPIDOGREL BISULFATE 75 MG/1
75 TABLET ORAL DAILY
Qty: 30 TABLET | Refills: 3 | Status: SHIPPED | OUTPATIENT
Start: 2022-09-22

## 2022-09-22 RX ORDER — SODIUM CHLORIDE 9 MG/ML
INJECTION, SOLUTION INTRAVENOUS
Status: DISCONTINUED | OUTPATIENT
Start: 2022-09-22 | End: 2022-09-22 | Stop reason: HOSPADM

## 2022-09-22 RX ORDER — PROPOFOL 10 MG/ML
INJECTION, EMULSION INTRAVENOUS AS NEEDED
Status: DISCONTINUED | OUTPATIENT
Start: 2022-09-22 | End: 2022-09-22 | Stop reason: HOSPADM

## 2022-09-22 RX ADMIN — SODIUM CHLORIDE: 900 INJECTION, SOLUTION INTRAVENOUS at 10:15

## 2022-09-22 RX ADMIN — LIDOCAINE HYDROCHLORIDE 40 MG: 20 INJECTION, SOLUTION EPIDURAL; INFILTRATION; INTRACAUDAL; PERINEURAL at 10:30

## 2022-09-22 RX ADMIN — PROPOFOL 50 MG: 10 INJECTION, EMULSION INTRAVENOUS at 10:34

## 2022-09-22 RX ADMIN — PROPOFOL 50 MG: 10 INJECTION, EMULSION INTRAVENOUS at 10:30

## 2022-09-22 NOTE — CONSULTS
[email or web address]      Date of  Admission: 9/22/2022  8:27 AM     CC: BAL  HPI:  79 yom with history of afib here for bal. He has history of afib with prior CVA and hemorrhage. Underwent watchman implant in June. Denies any changes in health since last office visit. Patient Active Problem List    Diagnosis Date Noted    Aspiration pneumonia ([address] 05/27/2022    Leukocytosis 05/27/2022    ARF (acute renal failure) ([address] 05/27/2022    Dehydration 05/27/2022    Nausea & vomiting 05/27/2022    Elevated brain natriuretic peptide (BNP) level 05/27/2022    AF (paroxysmal atrial fibrillation) (HCC)     DM type 2 causing vascular disease ([address]     Dysphagia as late effect of stroke     Hx of completed stroke     Hypertension     PEG (percutaneous endoscopic gastrostomy) status (HCC)     Rheumatoid arthritis ([address]       Leif Andersen MD  Past Medical History:   Diagnosis Date    AF (paroxysmal atrial fibrillation) (HCC)     Anxiety and depression     Diabetes ([address]     Dysphagia as late effect of stroke     Hx of completed stroke     Hyperlipidemia     Hypertension     Hypothyroid     PEG (percutaneous endoscopic gastrostomy) status ([address]     Rheumatoid arthritis ([address]       Past Surgical History:   Procedure Laterality Date    HX AMPUTATION Left     left 5 toes    HX HERNIA REPAIR       No Known Allergies   History reviewed. No pertinent family history.    Social History     Socioeconomic History    Marital status:      Spouse name: Not on file    Number of children: Not on file    Years of education: Not on file    Highest education level: Not on file   Occupational History    Not on file   Tobacco Use    Smoking status: Former    Smokeless tobacco: Never   Vaping Use    Vaping Use: Never used   Substance and Sexual Activity    Alcohol use: Never    Drug use: Yes     Types: Marijuana    Sexual activity: Not on file   Other Topics Concern    Not on file   Social History Narrative    Not on file     Social Determinants of Health     Financial Resource Strain: Not on file   Food Insecurity: Not on file   Transportation Needs: Not on file   Physical Activity: Not on file   Stress: Not on file   Social Connections: Not on file   Intimate Partner Violence: Not on file   Housing Stability: Not on file     Current Outpatient Medications   Medication Sig    furosemide (Lasix) 40 mg tablet Take 40 mg by mouth daily. insulin lispro (HumaLOG U-100 Insulin) 100 unit/mL cartridge 4 Units by SubCUTAneous route Before breakfast, lunch, dinner and at bedtime. With meals    albuterol sulfate (PROVENTIL;VENTOLIN) 2.5 mg/0.5 mL nebu nebulizer solution 2.5 mg by Nebulization route two (2) times a day. HYDROcodone-acetaminophen (NORCO) 5-325 mg per tablet Take 1 Tablet by mouth every six (6) hours as needed for Pain. ondansetron hcl (ZOFRAN) 4 mg tablet Take 4 mg by mouth as needed for Nausea or Vomiting. insulin glargine (Lantus Solostar U-100 Insulin) 100 unit/mL (3 mL) inpn 13 Units by SubCUTAneous route daily. am    atorvastatin (LIPITOR) 40 mg tablet Take 40 mg by mouth daily (after dinner). sertraline (ZOLOFT) 100 mg tablet Take 200 mg by mouth daily (after lunch). potassium chloride (KLOR-CON) 20 mEq pack Take 20 mEq by mouth daily (after lunch). dantrolene (DANTRIUM) 25 mg capsule Take 25 mg by mouth three (3) times daily. dilTIAZem IR (CARDIZEM) 60 mg tablet Take 60 mg by mouth two (2) times a day. midodrine (PROAMATINE) 2.5 mg tablet Take 2.5 mg by mouth two (2) times a day. carvediloL (COREG) 25 mg tablet Take 25 mg by mouth two (2) times a day. traZODone (DESYREL) 50 mg tablet Take 150 mg by mouth nightly. apixaban (ELIQUIS) 5 mg tablet Take 5 mg by mouth two (2) times a day. budesonide (PULMICORT) 0.5 mg/2 mL nbsp 500 mcg by Nebulization route daily. arformoteroL (BROVANA) 15 mcg/2 mL nebu neb solution 15 mcg by Nebulization route daily.     levothyroxine (SYNTHROID) 75 mcg tablet Take 50 mcg by mouth Daily (before breakfast). folic acid (FOLVITE) 1 mg tablet Take 1 mg by mouth daily. cefdinir (OMNICEF) 125 mg/5 mL suspension Take  by mouth two (2) times a day. (Patient not taking: No sig reported)    hydrocortisone (CORTEF) 5 mg tablet Take 5 mg by mouth daily. No current facility-administered medications for this encounter. Review of Symptoms:  A comprehensive review of systems was negative in 11 points other than stated above in HPI. Physical Exam    Visit Vitals  /65   Pulse 85   Temp 98.2 °F (36.8 °C)   Resp 21   Ht 5' 11\" (1.803 m)   Wt 63 kg (139 lb)   SpO2 97%   BMI 19.39 kg/m²     Skin warm and dry  HEENT: WNL  Oropharynx without exudate. Neck supple  Lungs clear  Labs: No results found for this or any previous visit (from the past 24 hour(s)). Assessment and Plan:    Roosevelt Reyes is admitted for ALIYA. Spoke with risks/benefits reviewed.

## 2022-09-22 NOTE — ANESTHESIA POSTPROCEDURE EVALUATION
* No procedures listed *. MAC    Anesthesia Post Evaluation        Patient location during evaluation: PACU  Note status: Adequate. Level of consciousness: responsive to verbal stimuli and sleepy but conscious  Pain management: satisfactory to patient  Airway patency: patent  Anesthetic complications: no  Cardiovascular status: acceptable  Respiratory status: acceptable  Hydration status: acceptable  Comments: +Post-Anesthesia Evaluation and Assessment    Patient: Priya Moreno MRN: [id number]  SSN: [id number]   YOB: 1955  Age: 79 y.o. Sex: male          Cardiovascular Function/Vital Signs    BP (!) 146/90   Pulse 92   Temp 36.8 °C (98.2 °F)   Resp 22   Ht 5' 11\" (1.803 m)   Wt 63 kg (139 lb)   SpO2 95%   BMI 19.39 kg/m²     Patient is status post * No procedures listed *. Nausea/Vomiting: Controlled. Postoperative hydration reviewed and adequate. Pain:  Pain Scale 1: Numeric (0 - 10) (09/22/22 1145)  Pain Intensity 1: 0 (09/22/22 1145)   Managed. Neurological Status: At baseline. Mental Status and Level of Consciousness: Arousable. Pulmonary Status:   O2 Device: None (Room air) (09/22/22 1145)   Adequate oxygenation and airway patent. Complications related to anesthesia: None    Post-anesthesia assessment completed. No concerns. I have evaluated the patient and the patient is stable and ready to be discharged from PACU . Signed By: Tiffanie Nevarez MD    9/22/2022        INITIAL Post-op Vital signs:   Vitals Value Taken Time   /72 09/22/22 1343   Temp     Pulse 63 09/22/22 1347   Resp 14 09/22/22 1347   SpO2 100 % 09/22/22 1347   Vitals shown include unvalidated device data.

## 2022-09-22 NOTE — ANESTHESIA PREPROCEDURE EVALUATION
Relevant Problems   RESPIRATORY SYSTEM   (+) Aspiration pneumonia (HCC)      CARDIOVASCULAR   (+) AF (paroxysmal atrial fibrillation) (HCC)   (+) Hypertension      RENAL FAILURE   (+) ARF (acute renal failure) (HCC)      ENDOCRINE   (+) DM type 2 causing vascular disease (HCC)   (+) Rheumatoid arthritis (HCC)       Anesthetic History   No history of anesthetic complications  PONV          Review of Systems / Medical History  Patient summary reviewed, nursing notes reviewed and pertinent labs reviewed    Pulmonary  Within defined limits                 Neuro/Psych   Within defined limits    CVA       Cardiovascular  Within defined limits  Hypertension        Dysrhythmias       Exercise tolerance: >4 METS     GI/Hepatic/Renal  Within defined limits              Endo/Other  Within defined limits  Diabetes  Hypothyroidism  Arthritis     Other Findings              Physical Exam    Airway  Mallampati: II  TM Distance: > 6 cm  Neck ROM: normal range of motion   Mouth opening: Normal     Cardiovascular  Regular rate and rhythm,  S1 and S2 normal,  no murmur, click, rub, or gallop             Dental  No notable dental hx       Pulmonary  Breath sounds clear to auscultation               Abdominal  GI exam deferred       Other Findings            Anesthetic Plan    ASA: 3  Anesthesia type: MAC          Induction: Intravenous  Anesthetic plan and risks discussed with: Patient

## 2022-09-22 NOTE — Clinical Note
Physician has arrived. DR. Nelly Campbell , cardiologist, in to discuss ALIYA results with wife and patient

## 2022-09-22 NOTE — PROGRESS NOTES
Anesthesia name:  DR. Tory Garcia, anesthesiologist, Rosa Maria Mckeon, DARIA    Anesthesia is present for case. Refer to anesthesia log for vitals.

## 2022-09-22 NOTE — DISCHARGE INSTRUCTIONS
Transesophageal Echocardiogram: What to Expect at [address] [facility]  A transesophageal echocardiogram is a test to help your doctor look at the inside of your heart. A small device called a transducer directs sound waves toward your heart. The sound waves make a picture of the heart's valves and chambers. Before the test, your throat was sprayed with medicine to numb it. Your throat may be sore for a few days. You may have had a sedative to help you relax. You may be unsteady after having sedation. It can take a few hours for the medicine's effects to wear off. Common side effects include nausea, vomiting, and feeling sleepy or tired. This care sheet gives you a general idea about how long it will take for you to recover. But each person recovers at a different pace. Follow the steps below to feel better as quickly as possible. How can you care for yourself at home? Activity    If a sedative was used, your doctor will tell you when it is safe for you to do your normal activities. For your safety, do not drive or operate any machinery that could be dangerous. Wait until the medicine wears off and you can think clearly and react easily. Diet    Do not eat or drink until the numbness in your throat wears off. When the numbness is gone, you can eat your normal diet. Throat lozenges and warm saltwater gargles can help relieve throat soreness. Throat lozenges can be used by people age 3 or older. And most people can gargle at age 6 and older. Do not drink alcohol for 24 hours. Follow-up care is a key part of your treatment and safety. Be sure to make and go to all appointments, and call your doctor if you are having problems. It's also a good idea to know your test results and keep a list of the medicines you take. When should you call for help? Call 911 anytime you think you may need emergency care. For example, call if:    Your stools are maroon or very bloody.      You vomit blood or what looks like coffee grounds. Call your doctor now or seek immediate medical care if:    You have pain in your chest, belly, or back. You have new or worse trouble swallowing. You have trouble breathing. Watch closely for changes in your health, and be sure to contact your doctor if you have any problems. Current as of: January 10, 2022               Content Version: 13.2  © 2006-2022 [employer]. Care instructions adapted under license by [employer] (which disclaims liability or warranty for this information). If you have questions about a medical condition or this instruction, always ask your healthcare professional. [place] [address] any warranty or liability for your use of this information. Clopidogrel (By mouth)   Clopidogrel ([id number])  Helps prevent stroke, heart attack, and other heart problems. This medicine is a platelet inhibitor. Brand Name(s): Plavix   There may be other brand names for this medicine. When This Medicine Should Not Be Used: This medicine is not right for everyone. Do not use it if you had an allergic reaction to clopidogrel. How to Use This Medicine:   Tablet  Your doctor will tell you how much medicine to use. Do not use more than directed. This medicine should come with a Medication Guide. Ask your pharmacist for a copy if you do not have one. Missed dose: Take a dose as soon as you remember. If it is almost time for your next dose, wait until then and take a regular dose. Do not take extra medicine to make up for a missed dose. Store the medicine in a closed container at room temperature, away from heat, moisture, and direct light. Drugs and Foods to Avoid:   Ask your doctor or pharmacist before using any other medicine, including over-the-counter medicines, vitamins, and herbal products. Some medicines can affect how clopidogrel works.  Tell your doctor if you are using any of the following:   Esomeprazole, omeprazole, repaglinide, or warfarin  Medicine to treat depression  NSAID pain or arthritis medicine (including celecoxib, diclofenac, ibuprofen, or naproxen)  Warnings While Using This Medicine:   Tell your doctor if you are pregnant or breastfeeding, or if you have bleeding problems, stomach ulcer or bleeding, a recent stroke, or have a history of bleeding problems. This medicine can cause you to bleed and bruise more easily. Take precautions to avoid injury. Brush and floss your teeth gently, do not play rough sports, and be careful with sharp objects. Severe bleeding can be life-threatening. This medicine may cause a rare but serious blood clotting condition called thrombotic thrombocytopenic purpura. Make sure any doctor or dentist who treats you knows that you are using this medicine. Tell your doctor if you plan to have surgery or a dental procedure. Do not stop using this medicine suddenly. Your doctor will need to slowly decrease your dose before you stop it completely. Your doctor will check your progress and the effects of this medicine at regular visits. Keep all appointments. Keep all medicine out of the reach of children. Never share your medicine with anyone. Possible Side Effects While Using This Medicine:   Call your doctor right away if you notice any of these side effects: Allergic reaction: Itching or hives, swelling in your face or hands, swelling or tingling in your mouth or throat, chest tightness, trouble breathing  Bloody or black, tarry stools  Nosebleeds  Pinpoint red or purple spots on your skin or in your mouth  Problems with vision, speech, or walking  Red or dark brown urine  Seizures  Severe stomach pain  Trouble breathing, tiredness, fast heartbeat, yellow skin or eyes  Unusual bleeding, bruising, or weakness  Vomiting of blood or vomit that looks like coffee grounds  If you notice other side effects that you think are caused by this medicine, tell your doctor.    Call your doctor for medical advice about side effects. You may report side effects to FDA at 3-845-XOE-3178  © 2017 [facility] Information is for End User's use only and may not be sold, redistributed or otherwise used for commercial purposes. The above information is an  only. It is not intended as medical advice for individual conditions or treatments. Talk to your doctor, nurse or pharmacist before following any medical regimen to see if it is safe and effective for you.

## 2022-10-14 ENCOUNTER — TRANSCRIBE ORDER (OUTPATIENT)
Dept: SCHEDULING | Age: 67
End: 2022-10-14

## 2022-10-14 DIAGNOSIS — I63.9 CVA (CEREBRAL VASCULAR ACCIDENT) (HCC): ICD-10-CM

## 2022-10-14 DIAGNOSIS — R13.10 DYSPHAGIA: Primary | ICD-10-CM

## 2022-11-14 ENCOUNTER — HOSPITAL ENCOUNTER (OUTPATIENT)
Dept: WOUND CARE | Age: 67
Discharge: HOME | End: 2022-11-14
Payer: MEDICARE

## 2022-11-14 VITALS
DIASTOLIC BLOOD PRESSURE: 72 MMHG | RESPIRATION RATE: 24 BRPM | HEART RATE: 73 BPM | SYSTOLIC BLOOD PRESSURE: 146 MMHG | TEMPERATURE: 99 F

## 2022-11-14 DIAGNOSIS — L89.132 PRESSURE INJURY OF RIGHT LOWER BACK, STAGE 2 (HCC): Primary | ICD-10-CM

## 2022-11-14 DIAGNOSIS — L89.152 PRESSURE INJURY OF SACRAL REGION, STAGE 2 (HCC): ICD-10-CM

## 2022-11-14 PROBLEM — L89.102 PRESSURE INJURY OF LOWER BACK, STAGE 2 (HCC): Status: ACTIVE | Noted: 2022-11-14

## 2022-11-14 PROBLEM — G81.94 LEFT HEMIPARESIS (HCC): Status: ACTIVE | Noted: 2022-11-14

## 2022-11-14 PROCEDURE — 99203 OFFICE O/P NEW LOW 30 MIN: CPT | Performed by: SURGERY

## 2022-11-14 PROCEDURE — 99204 OFFICE O/P NEW MOD 45 MIN: CPT

## 2022-11-14 RX ORDER — BACITRACIN 500 [USP'U]/G
OINTMENT TOPICAL ONCE
OUTPATIENT
Start: 2022-11-14 | End: 2022-11-14

## 2022-11-14 RX ORDER — SILVER SULFADIAZINE 10 G/1000G
CREAM TOPICAL ONCE
OUTPATIENT
Start: 2022-11-14 | End: 2022-11-14

## 2022-11-14 RX ORDER — LIDOCAINE HYDROCHLORIDE 20 MG/ML
JELLY TOPICAL ONCE
OUTPATIENT
Start: 2022-11-14 | End: 2022-11-14

## 2022-11-14 RX ORDER — GENTAMICIN SULFATE 1 MG/G
OINTMENT TOPICAL ONCE
OUTPATIENT
Start: 2022-11-14 | End: 2022-11-14

## 2022-11-14 RX ORDER — LIDOCAINE 40 MG/G
CREAM TOPICAL ONCE
OUTPATIENT
Start: 2022-11-14 | End: 2022-11-14

## 2022-11-14 RX ORDER — LIDOCAINE HYDROCHLORIDE 40 MG/ML
SOLUTION TOPICAL ONCE
OUTPATIENT
Start: 2022-11-14 | End: 2022-11-14

## 2022-11-14 RX ORDER — MUPIROCIN 20 MG/G
OINTMENT TOPICAL ONCE
OUTPATIENT
Start: 2022-11-14 | End: 2022-11-14

## 2022-11-14 RX ORDER — CLOBETASOL PROPIONATE 0.5 MG/G
OINTMENT TOPICAL ONCE
OUTPATIENT
Start: 2022-11-14 | End: 2022-11-14

## 2022-11-14 RX ORDER — TRIAMCINOLONE ACETONIDE 1 MG/G
OINTMENT TOPICAL ONCE
OUTPATIENT
Start: 2022-11-14 | End: 2022-11-14

## 2022-11-14 RX ORDER — LIDOCAINE 50 MG/G
OINTMENT TOPICAL ONCE
OUTPATIENT
Start: 2022-11-14 | End: 2022-11-14

## 2022-11-14 NOTE — WOUND CARE
11/14/22 1515   Wound Sacrum #1 11/14/22   Date First Assessed/Time First Assessed: 11/14/22 1514   Present on Hospital Admission: Yes  Location: Sacrum  Wound Description: #1  Date of First Observation: 11/14/22   Wound Image    Dressing Status Old drainage noted   Cleansed Cleansed with saline   Wound Length (cm) 12 cm   Wound Width (cm) 16 cm   Wound Depth (cm) 0.1 cm   Wound Surface Area (cm^2) 192 cm^2   Wound Volume (cm^3) 19.2 cm^3   Wound Assessment [place]/red;Slough   Drainage Amount Large   Drainage Description Serous;Green   Wound Odor None   Zulema-Wound/Incision Assessment Excoriated;Fragile;Blanchable erythema   Edges Flat/open edges   Wound Back Lower #2 11/14/22   Date First Assessed/Time First Assessed: 11/14/22 1515   Present on Hospital Admission: Yes  Location: Back  Wound Location Orientation: Lower  Wound Description: #2  Date of First Observation: 11/14/22   Wound Image    Dressing/Treatment Open to air   Wound Length (cm) 5 cm   Wound Width (cm) 6 cm   Wound Depth (cm) 0.1 cm   Wound Surface Area (cm^2) 30 cm^2   Wound Volume (cm^3) 3 cm^3   Wound Assessment Pink/red   Drainage Amount Moderate   Drainage Description Serous   Wound Odor None   Zulema-Wound/Incision Assessment Blanchable erythema   Edges Flat/open edges   Visit Vitals  BP (!) 146/72 (BP 1 Location: Right upper arm, BP Patient Position: Sitting)   Pulse 73   Temp 99 °F (37.2 °C)   Resp 24

## 2022-11-14 NOTE — PROGRESS NOTES
11/14/22 1608   Wound Sacrum #1 11/14/22   Date First Assessed/Time First Assessed: 11/14/22 1514   Present on Hospital Admission: Yes  Location: Sacrum  Wound Description: #1  Date of First Observation: 11/14/22   Dressing/Treatment   (Calmoseptine ointment)   Wound Back Lower #2 11/14/22   Date First Assessed/Time First Assessed: 11/14/22 1515   Present on Hospital Admission: Yes  Location: Back  Wound Location Orientation: Lower  Wound Description: #2  Date of First Observation: 11/14/22   Dressing/Treatment   (Calmoseptine ointment)   Discharge Condition: Stable     Pain: 0    Ambulatory Status: Wheelchair     Discharge Destination: Home     Transportation: Car    Accompanied by: Self  and Family/Caregiver     Discharge instructions reviewed with Patient and Family/Caregiver  and copy or written instructions have been provided. All questions/concerns have been addressed at this time.

## 2022-11-14 NOTE — DISCHARGE INSTRUCTIONS
Discharge Instructions for  [facility]  [address] [place], [address] [facility]  Telephone: [phone]    NAME:  Xena Granados OF BIRTH:  1955  ACCOUNT NUMBER :  [de-identified]  DATE: 11/14/2022    Wound Cleansing:   Do not scrub or use excessive force. Cleanse wound prior to applying a clean dressing with:      [x] Cleanse wound with: Larry Allan baby shampoo lather leave 2-3 min then rinse with water      [] May Shower at Discharge     []  Shower on days of dressing change, remove dressing first    [] Keep Wound Dry in Shower (may purchase a cast cover if needed)     Topical Treatments:  Do not apply lotions, creams, or ointments to wound bed unless directed. [] Apply moisturizing lotion A&D ointment to skin surrounding the wound prior to dressing change.  [] Apply antifungal ointment to skin surrounding the wound prior to dressing change.  [] Apply thin film of Zinc barrier ointment to skin immediately around wound. [x] Other: Calmoseptine ointment     Dressings:           Wound Location Sacrum and Lower back     [x] Apply Primary Dressing:        [x] Other:  Calmoseptine ointment, ok to use zinc barrier ointment in office      [] Cover and Secure with:      [x] Other: Do not need to place sacral pads at this time, unless you notice more drainage     Avoid contact of tape with skin. [x] Change dressing: [x] Twice a day or as needed          Pressure Relief:  [] When sitting, shift position or do seat lifts every 15 minutes. [x] Wheelchair cushion [x] Specialty Bed/Mattress  [x] Turn every 2 hours when in bed. Avoid position directing pressure on wound site. Turned to the side as far over as you can go and he can tolerate.   Ok to sit at a 90 degree angle would be more beneficial. Not to be on the midline reclined position for more then 1 hr at a time and off for a 1/2 hr.       Off-Loading:   [x] Off-loading when [] walking  [x] in bed [x] sitting  [] Total non-weight bearing  [] Right Leg  [] Left Leg   [] Assistive Device [] Walker [] Cane  [] Wheelchair  [] Crutches   [] Surgical shoe    [] Podus Boot(s)   [] Foam Boot(s)  [] Roll About    [] Cast Boot [] CROW Boot  [] Other:       Activity:  [x] Activity as tolerated            Return Appointment:    [] Nurse visit scheduled in *** day(s) or *** week(s)   [x] Return Appointment: With Dr. Fabian Osler in  [facility])  [] Ordered tests: {taylor testing :33691}     Electronically signed on 11/14/2022 at 3:32 PM     S[address]: Should you experience any significant changes in your wound(s) or have questions about your wound care, please contact the [address] at [address] [place] [address] 8:00 am - 4:30. If you need help with your wound outside these hours and cannot wait until we are again available, contact your PCP or go to the hospital emergency room. PLEASE NOTE: IF YOU ARE UNABLE TO OBTAIN WOUND SUPPLIES, CONTINUE TO USE THE SUPPLIES YOU HAVE AVAILABLE UNTIL YOU ARE ABLE TO REACH US. IT IS MOST IMPORTANT TO KEEP THE WOUND COVERED AT ALL TIMES.      Physician Signature:_______________________ Dr. Fabian Osler

## 2022-11-15 NOTE — H&P
[facility]vel[facility] [address]  HISTORY AND PHYSICAL    Name:  Audra Hess  MR#:  [id number]  :  1955  ACCOUNT #:  [de-identified]  ADMIT DATE:  2022      CHIEF COMPLAINT:  The patient is a 26-year-old man who is referred to the wound care center regarding ulcerations on the lower back and sacral region. HISTORY OF PRESENT ILLNESS:  The patient has had two strokes, with the last being about a year ago. At the last stroke, he had significant left hemiparesis. He is not able to stand and has no significant function in his left upper extremity. The patient is cared for predominantly by his wife. Related to his stroke, the patient has what sounds like dysphagia and receives both nutrition and fluid by way of a PEG tube. He does not normally eat or swallow liquids. The patient is reported to have some difficulty with clearance of secretions unless he is elevated to about a 45-degree angle from the supine. The patient's wife reports he spends from 2 to 7 hours per day in a recliner. When he is in the recliner, he is reclined back about 45 degrees. The patient's wife has acquired for his recliner what, by description, sounds like an alternating pressure overlay. The patient sleeps on a standard Sleep Number mattress. Two weeks ago, the patient's wife added on his side what, by description, sounds like an alternating pressure overlay. At night, the patient also has the head of the bed elevated and a wedge pillow to create about 45 degrees of elevation. This is needed because of his difficulty with secretions. The patient was noted about a month ago to develop the ulcers on the lower back and sacral region. He has had a good deal of drainage. The drainage has seemed to decrease somewhat over the last 4 or 5 days. The patient is incontinent of stool, but with a bowel regimen has bowel movements about every 3 days. The patient is incontinent of urine.   The patient's wife reports that he has frequent changes, about four times per day, of absorbent pads which seem effective in controlling incontinent urine output. The patient has some discomfort when turned toward his left side. No discomfort is described when he is turned to the right side. The patient has a 40-year history of diabetes. Diabetes had been very well controlled, but recently he was reported to have a hemoglobin A1c of around 8.3. He has had some recent adjustments on his medications. He does see an endocrinologist.    The patient has a history of a Watchman procedure about a year ago. He does not presently take anticoagulants. The patient does not report shortness of breath, but his ability to exert is limited. PAST MEDICAL HISTORY:  Includes hypertension, rheumatoid arthritis, hyperlipidemia. SOCIAL HISTORY:  The patient is a former smoker. The patient is reported to use THC. The patient has bben using Calmoseptine on the wounds covered by bordered foam dressing. Reported weight 139 pounds, height 5 feet 11 inches. PHYSICAL EXAMINATION:  VITAL SIGNS:  Blood pressure 146/72, pulse 73, respirations 24, temperature 99. GENERAL APPEARANCE:  The patient is an alert man in no acute distress. HEAD AND NECK:  Head and neck examination showed no jaundice. LUNGS:  Lungs are clear bilaterally without rales, rhonchi, or wheezes. HEART:  Heart is irregular without murmur, gallop, or rub. NEUROLOGIC:  The patient is alert and oriented. He has no significant movement of the left arm or left leg. He appears to have normal movement of right upper and lower extremities. The patient is noted to have some dysphasia. SKIN:  Examination of the sacral region revealed a wide area of 12 x 16 x 0.1 cm dimension with blanching erythema. There was some wetness of the skin surface more centrally.   This all appeared to be partial thickness skin injury with no exposed subcutaneous tissue. Slightly more superiorly in the region of the midline, overlying the vertebral column, there was an area of skin injury 5 x 6 x 0.1 cm which also appeared to be partial thickness with blanchable erythema. There were no abnormalities of the skin overlying the right or left ischium or overlying the hips. I explained to the patient and his wife that these areas appear to be pressure injuries. Because the patient is kept at a 45-degree reclined angle on the midline, he is having prolonged pressure on the area of the wounds, both at night time and during the daytime. Ideally, the patient would never be on his midline, either lying supine or when sitting in a reclined position. Ideally, he would be either sitting directly upright at 90 degrees or would be lying down fully on the right or left sides. Because of his tendency for difficulty clearing secretions and his risk of aspiration on lying flat, it may be necessary to compromise some of the ideals of positioning. I have recommended the patient spend no more than one hour in a reclined 45-degree position on the midline while on his alternating pressure overlay surfaces. He should then be at least one hour off the midline, turned either full lateral to the right or full lateral to the left. Alternatively, he could be sitting directly upright at 90 degrees on a memory foam seating pad with sacral cutout. For now, I would continue use of the alternating pressure overlay on both his bed and his recliner. DRESSING ORDERED:  Apply Calmoseptine twice per day to the areas of skin redness. As long as drainage from the wounds is limited, no overlay dressing as needed. If the drainage is significant, then resume use of the overlay dressing. The patient is using absorbent pads for controlled urinary incontinence. He will continue to need to have frequent changes of the pads to avoid moisture being held against the skin.     The patient will continue with his tube feeding and hydration. The patient will follow up in the wound care center in 2 weeks. FINAL DIAGNOSES:  Pressure injury of the sacral region, stage II, L89.152; pressure injury of the lower back, stage II, L89.102.       Pam Dejesus MD      [id number]  D:  11/14/2022 16:31  T:  11/14/2022 21:39  JOB #:  [id number]

## 2022-11-28 ENCOUNTER — HOSPITAL ENCOUNTER (OUTPATIENT)
Dept: WOUND CARE | Age: 67
Discharge: HOME | End: 2022-11-28
Payer: MEDICARE

## 2022-11-28 VITALS
SYSTOLIC BLOOD PRESSURE: 143 MMHG | TEMPERATURE: 98.4 F | RESPIRATION RATE: 20 BRPM | DIASTOLIC BLOOD PRESSURE: 88 MMHG | HEART RATE: 77 BPM

## 2022-11-28 DIAGNOSIS — L89.152 PRESSURE INJURY OF SACRAL REGION, STAGE 2 (HCC): Primary | ICD-10-CM

## 2022-11-28 DIAGNOSIS — Z86.73 HX OF COMPLETED STROKE: ICD-10-CM

## 2022-11-28 DIAGNOSIS — L89.142 PRESSURE INJURY OF LEFT LOWER BACK, STAGE 2 (HCC): ICD-10-CM

## 2022-11-28 PROCEDURE — 99213 OFFICE O/P EST LOW 20 MIN: CPT

## 2022-11-28 PROCEDURE — 99213 OFFICE O/P EST LOW 20 MIN: CPT | Performed by: SURGERY

## 2022-11-28 NOTE — PROGRESS NOTES
CHIEF COMPLAINT:  The patient is a 59-year-old man who is referred to the wound care center regarding ulcerations on the lower back and sacral region. The patient was noted to develop these wounds around 10/15/2022. The patient was first seen at the [address] [place] [address] on 11/14/2022. HISTORY OF PRESENT ILLNESS:  The patient has had two strokes, with the last being about a year ago. At the last stroke, he had significant left hemiparesis. He is not able to stand and has no significant function in his left upper extremity. The patient is cared for predominantly by his wife. Related to his stroke, the patient has what sounds like dysphagia and receives both nutrition and fluid by way of a PEG tube. He does not normally eat or swallow liquids. The patient is reported to have some difficulty with clearance of secretions unless he is elevated to about a 45-degree angle from the supine. The patient's wife reports he had spent from 2 to 7 hours per day in a recliner. When he is in the recliner, he had been reclined back about 45 degrees. The patient's wife has acquired for his recliner what, by description, sounds like an alternating pressure overlay. The patient has some discomfort when turned toward his left side. No discomfort is described when he is turned to the right side. The patient sleeps on a standard Sleep Number mattress. Around 11/1/2022, the patient's wife added on his side what, by description, sounds like an alternating pressure overlay. At night, the patient also has the head of the bed elevated and a wedge pillow to create about 45 degrees of elevation. This is needed because of his difficulty with secretions. The patient now has memory foam seating pad on his recliner with sacral cutout. He spens time sitting straight up as well as reclined. He is spending periods of time in the recliner, interspersed with time in bed lying on side.      The patient is incontinent of stool, but with a bowel regimen has bowel movements about every 3 days. The patient is incontinent of urine. The patient's wife reports that he has frequent changes, about four times per day, of absorbent pads which seem effective in controlling incontinent urine output. The patient has a 40-year history of diabetes. Diabetes had been very well controlled, but recently he was reported to have a hemoglobin A1c of around 8.3. He has had some recent adjustments on his medications. He does see an endocrinologist.     The patient has a history of a Watchman procedure about a year ago. He does not presently take anticoagulants. The patient does not report shortness of breath, but his ability to exert is limited. PAST MEDICAL HISTORY:  Includes hypertension, rheumatoid arthritis, hyperlipidemia. SOCIAL HISTORY:  The patient is a former smoker. The patient is reported to use THC. The patient had been using Calmoseptine on the wounds covered by bordered foam dressing. Current dressing:  wash sacral and buttocks region with J & J baby shampoo, remove with water, dry skin. Apply Calmoseptine. Leave open to air during day. Apply Calmoseptine and cover with border foam dressing at night. Reported weight 139 pounds, height 5 feet 11 inches. PHYSICAL EXAMINATION:    GENERAL APPEARANCE:  The patient is an alert man in no acute distress. He has aphasia. SKIN:  Examination of the sacral region and adjacent buttocks revealed an area with no more partial thickness skin loss, but with blanching erythema of otherwise intact skin in  scattered areas about 10 x 6 cm. Slightly more superiorly in the region of the midline, overlying the vertebral column, the prior area of skin injury appears healed. Stage 2 pressure injury of sacral region and back, improved. I have explained to the patient and his wife that these areas appear to be pressure injuries.   Because the patient is kept at a 45-degree reclined angle on the midline, he is having prolonged pressure on the area of the wounds, both at night time and during the daytime. Ideally, the patient would never be on his midline, either lying supine or when sitting in a reclined position. Ideally, he would be either sitting directly upright at 90 degrees or would be lying down fully on the right or left sides. Because of his tendency for difficulty clearing secretions and his risk of aspiration on lying flat, it may be necessary to compromise some of the ideals of positioning. Use memory foam seating pad with sacral cutout on recliner or chair. Spend no more than one hour in a reclined 45-degree position on the midline while on his alternating pressure overlay surfaces. He should then be at least one hour off the midline, turned either full lateral to the right or full lateral to the left. Alternatively, he could be sitting directly upright at 90 degrees on a memory foam seating pad with sacral cutout. Continue use of the alternating pressure overlay on his bed     DRESSING ORDERED:  Current dressing:  wash sacral and buttocks region with J & J baby shampoo, remove with water, dry skin by blotting with towel, then use hair drier on cool setting to complete drying. Apply Calmoseptine. Leave open to air during day. Apply Calmoseptine and cover with border foam dressing at night. The patient is using absorbent pads for controlled urinary incontinence. He will continue to need to have frequent changes of the pads to avoid moisture being held against the skin. The patient will continue with his tube feeding and hydration. The patient will follow up in the wound care center in 6 weeks. May cancel appointment if completely healed before then. FINAL DIAGNOSES:  Pressure injury of the sacral region, stage II, L89.152; pressure injury of the lower back, stage II, L89.102.         Vane Vo Karla Aguilar MD

## 2022-11-28 NOTE — DISCHARGE INSTRUCTIONS
Discharge Instructions for  [facility]  [place] [address] [place], [address] [facility] [address]  Telephone: [phone]    NAME:  Mario Dodge OF BIRTH:  1955  ACCOUNT NUMBER :  [de-identified]  DATE: 11/28/2022    Wound Cleansing:   Do not scrub or use excessive force. Cleanse wound prior to applying a clean dressing with:      [x] Cleanse wound with: Christy Kimbrough and Spike baby shampoo lather leave 2-3 min then rinse with water      [] May Shower at Discharge     []  Shower on days of dressing change, remove dressing first    [] Keep Wound Dry in Shower (may purchase a cast cover if needed)     Topical Treatments:  Do not apply lotions, creams, or ointments to wound bed unless directed. [] Apply moisturizing lotion A&D ointment to skin surrounding the wound prior to dressing change.  [] Apply antifungal ointment to skin surrounding the wound prior to dressing change.  [] Apply thin film of Zinc barrier ointment to skin immediately around wound. [x] Other: Calmoseptine ointment     Dressings:           Wound Location Sacrum and Lower back     [x] Apply Primary Dressing:        [x] Other:  Calmoseptine ointment, ok to use zinc barrier ointment in office      [] Cover and Secure with:      [x] Other: Do not need to place sacral pads at this time, unless you notice more drainage     Avoid contact of tape with skin. [x] Change dressing: [x] Twice a day or as needed          Pressure Relief:  [] When sitting, shift position or do seat lifts every 15 minutes. [x] Wheelchair cushion [x] Specialty Bed/Mattress  [x] Turn every 2 hours when in bed. Avoid position directing pressure on wound site. Turned to the side as far over as you can go and he can tolerate.   Ok to sit at a 90 degree angle would be more beneficial. Not to be on the midline reclined position for more then 1 hr at a time and off for a 1/2 hr.       Off-Loading:   [x] Off-loading when [] walking  [x] in bed [x] sitting  [] Total non-weight bearing  [] Right Leg  [] Left Leg   [] Assistive Device [] Walker [] Cane  [] Wheelchair  [] Crutches   [] Surgical shoe    [] Podus Boot(s)   [] Foam Boot(s)  [] Roll About    [] Cast Boot [] CROW Boot  [] Other:       Activity:  [x] Activity as tolerated            Return Appointment:    [] Nurse visit scheduled in *** day(s) or *** week(s)   [x] Return Appointment: With Dr. London Bui in  [facility])  [] Ordered tests: {taylor testing :22792}     Electronically signed on 11/28/2022    [address] Information: Should you experience any significant changes in your wound(s) or have questions about your wound care, please contact the [facility] at [address] [place] [address] 8:00 am - 4:30. If you need help with your wound outside these hours and cannot wait until we are again available, contact your PCP or go to the hospital emergency room. PLEASE NOTE: IF YOU ARE UNABLE TO OBTAIN WOUND SUPPLIES, CONTINUE TO USE THE SUPPLIES YOU HAVE AVAILABLE UNTIL YOU ARE ABLE TO REACH US. IT IS MOST IMPORTANT TO KEEP THE WOUND COVERED AT ALL TIMES.      Physician Signature:_______________________ Dr. London Bui

## 2022-11-28 NOTE — WOUND CARE
11/28/22 1359   Wound Sacrum #1 11/14/22   Date First Assessed/Time First Assessed: 11/14/22 1514   Present on Hospital Admission: Yes  Location: Sacrum  Wound Description: #1  Date of First Observation: 11/14/22   Wound Image    Wound Length (cm) 0.1 cm   Wound Width (cm) 0.1 cm   Wound Depth (cm) 0.1 cm   Wound Surface Area (cm^2) 0.01 cm^2   Change in Wound Size % 99.99   Wound Volume (cm^3) 0.001 cm^3   Wound Healing % 100   Wound Assessment Epithelialization   Drainage Amount None   Wound Odor None   Zulema-Wound/Incision Assessment Fragile   Wound Back Lower #2 11/14/22   Date First Assessed/Time First Assessed: 11/14/22 1515   Present on Hospital Admission: Yes  Location: Back  Wound Location Orientation: Lower  Wound Description: #2  Date of First Observation: 11/14/22   Wound Image    Wound Length (cm) 0.1 cm   Wound Width (cm) 0.1 cm   Wound Depth (cm) 0.1 cm   Wound Surface Area (cm^2) 0.01 cm^2   Change in Wound Size % 99.97   Wound Volume (cm^3) 0.001 cm^3   Wound Healing % 100   Wound Assessment Epithelialization   Drainage Amount None   Wound Odor None   Zulema-Wound/Incision Assessment Fragile   Edges Flat/open edges   Visit Vitals  BP (!) 143/88 (BP 1 Location: Right upper arm, BP Patient Position: Sitting)   Pulse 77   Temp 98.4 °F (36.9 °C)   Resp 20

## 2022-11-28 NOTE — WOUND CARE
11/28/22 1425   Wound Sacrum #1 11/14/22   Date First Assessed/Time First Assessed: 11/14/22 1514   Present on Hospital Admission: Yes  Location: Sacrum  Wound Description: #1  Date of First Observation: 11/14/22   Dressing/Treatment Calmoseptine /Zinc Oxide with menthol   Wound Back Lower #2 11/14/22   Date First Assessed/Time First Assessed: 11/14/22 1515   Present on Hospital Admission: Yes  Location: Back  Wound Location Orientation: Lower  Wound Description: #2  Date of First Observation: 11/14/22   Dressing/Treatment Calmoseptine /Zinc Oxide with menthol   Discharge Condition: Stable     Pain: 0    Ambulatory Status: Wheelchair     Discharge Destination: Home     Transportation: Car    Accompanied by: Family/Caregiver     Discharge instructions reviewed with Patient and Family/Caregiver  and copy or written instructions have been provided. All questions/concerns have been addressed at this time.

## 2022-12-04 ENCOUNTER — APPOINTMENT (OUTPATIENT)
Dept: GENERAL RADIOLOGY | Age: 67
End: 2022-12-04
Attending: EMERGENCY MEDICINE
Payer: MEDICARE

## 2022-12-04 ENCOUNTER — HOSPITAL ENCOUNTER (EMERGENCY)
Age: 67
Discharge: HOME | End: 2022-12-04
Attending: EMERGENCY MEDICINE
Payer: MEDICARE

## 2022-12-04 VITALS
DIASTOLIC BLOOD PRESSURE: 72 MMHG | SYSTOLIC BLOOD PRESSURE: 143 MMHG | TEMPERATURE: 99.1 F | OXYGEN SATURATION: 94 % | HEART RATE: 85 BPM | RESPIRATION RATE: 20 BRPM

## 2022-12-04 DIAGNOSIS — R50.9 FEVER, UNSPECIFIED FEVER CAUSE: ICD-10-CM

## 2022-12-04 DIAGNOSIS — R53.1 WEAKNESS: ICD-10-CM

## 2022-12-04 DIAGNOSIS — R05.1 ACUTE COUGH: Primary | ICD-10-CM

## 2022-12-04 LAB
ALBUMIN SERPL-MCNC: 3.2 G/DL (ref 3.5–5)
ALBUMIN/GLOB SERPL: 0.7 {RATIO} (ref 1.1–2.2)
ALP SERPL-CCNC: 198 U/L (ref 45–117)
ALT SERPL-CCNC: 26 U/L (ref 12–78)
ANION GAP SERPL CALC-SCNC: 12 MMOL/L (ref 5–15)
AST SERPL-CCNC: 29 U/L (ref 15–37)
BASOPHILS # BLD: 0.1 K/UL (ref 0–0.1)
BASOPHILS NFR BLD: 1 % (ref 0–1)
BILIRUB SERPL-MCNC: 0.3 MG/DL (ref 0.2–1)
BUN SERPL-MCNC: 68 MG/DL (ref 6–20)
BUN/CREAT SERPL: 43 (ref 12–20)
CALCIUM SERPL-MCNC: 8.9 MG/DL (ref 8.5–10.1)
CHLORIDE SERPL-SCNC: 99 MMOL/L (ref 97–108)
CO2 SERPL-SCNC: 25 MMOL/L (ref 21–32)
COVID-19 RAPID TEST, COVR: NOT DETECTED
CREAT SERPL-MCNC: 1.6 MG/DL (ref 0.7–1.3)
DIFFERENTIAL METHOD BLD: ABNORMAL
EOSINOPHIL # BLD: 0.1 K/UL (ref 0–0.4)
EOSINOPHIL NFR BLD: 1 % (ref 0–7)
ERYTHROCYTE [DISTWIDTH] IN BLOOD BY AUTOMATED COUNT: 15.8 % (ref 11.5–14.5)
FLUAV AG NPH QL IA: NEGATIVE
FLUBV AG NOSE QL IA: NEGATIVE
GLOBULIN SER CALC-MCNC: 4.4 G/DL (ref 2–4)
GLUCOSE SERPL-MCNC: 260 MG/DL (ref 65–100)
HCT VFR BLD AUTO: 29.5 % (ref 36.6–50.3)
HGB BLD-MCNC: 9.6 G/DL (ref 12.1–17)
IMM GRANULOCYTES # BLD AUTO: 0 K/UL (ref 0–0.04)
IMM GRANULOCYTES NFR BLD AUTO: 0 % (ref 0–0.5)
LACTATE SERPL-SCNC: 1.6 MMOL/L (ref 0.4–2)
LYMPHOCYTES # BLD: 1.2 K/UL (ref 0.8–3.5)
LYMPHOCYTES NFR BLD: 10 % (ref 12–49)
MCH RBC QN AUTO: 28 PG (ref 26–34)
MCHC RBC AUTO-ENTMCNC: 32.5 G/DL (ref 30–36.5)
MCV RBC AUTO: 86 FL (ref 80–99)
MONOCYTES # BLD: 0.6 K/UL (ref 0–1)
MONOCYTES NFR BLD: 6 % (ref 5–13)
NEUTS SEG # BLD: 9.5 K/UL (ref 1.8–8)
NEUTS SEG NFR BLD: 83 % (ref 32–75)
NRBC # BLD: 0 K/UL (ref 0–0.01)
NRBC BLD-RTO: 0 PER 100 WBC
PLATELET # BLD AUTO: 167 K/UL (ref 150–400)
PMV BLD AUTO: 12.5 FL (ref 8.9–12.9)
POTASSIUM SERPL-SCNC: 4.5 MMOL/L (ref 3.5–5.1)
PROT SERPL-MCNC: 7.6 G/DL (ref 6.4–8.2)
RBC # BLD AUTO: 3.43 M/UL (ref 4.1–5.7)
SODIUM SERPL-SCNC: 136 MMOL/L (ref 136–145)
SOURCE, COVRS: NORMAL
WBC # BLD AUTO: 11.5 K/UL (ref 4.1–11.1)

## 2022-12-04 PROCEDURE — 99284 EMERGENCY DEPT VISIT MOD MDM: CPT

## 2022-12-04 PROCEDURE — 85025 COMPLETE CBC W/AUTO DIFF WBC: CPT

## 2022-12-04 PROCEDURE — 87804 INFLUENZA ASSAY W/OPTIC: CPT

## 2022-12-04 PROCEDURE — 87040 BLOOD CULTURE FOR BACTERIA: CPT

## 2022-12-04 PROCEDURE — 87635 SARS-COV-2 COVID-19 AMP PRB: CPT

## 2022-12-04 PROCEDURE — 80053 COMPREHEN METABOLIC PANEL: CPT

## 2022-12-04 PROCEDURE — 36415 COLL VENOUS BLD VENIPUNCTURE: CPT

## 2022-12-04 PROCEDURE — 71045 X-RAY EXAM CHEST 1 VIEW: CPT

## 2022-12-04 PROCEDURE — 83605 ASSAY OF LACTIC ACID: CPT

## 2022-12-04 RX ORDER — CEFDINIR 300 MG/1
300 CAPSULE ORAL 2 TIMES DAILY
Qty: 20 CAPSULE | Refills: 0 | Status: SHIPPED | OUTPATIENT
Start: 2022-12-04

## 2022-12-04 RX ORDER — ALBUTEROL SULFATE 90 UG/1
2 AEROSOL, METERED RESPIRATORY (INHALATION)
Qty: 1 EACH | Refills: 1 | Status: SHIPPED | OUTPATIENT
Start: 2022-12-04

## 2022-12-04 NOTE — ED NOTES
Purposeful rounding done. Pt sitting up on stretcher. Offered assist with any needs. Pt weaned off o2 as per Dr Sandip Card instruction. Continue to monitor pt respiratory statis and spo2 to see how he does off oxygen. Call bell in reach will continue to monitor.

## 2022-12-04 NOTE — ED TRIAGE NOTES
Pt was wheeled to the treatment area in his personal w/c accompanied by his wife. Pts wife states \"He has a hystory of stroke he has problems breathing any way he started a little cough yesterday and a fever 99. Today he is worse today he has a worse cough white mucus and his fever 101.2 this morning and his oxygen level was low. \" Pts spo2 87% RA placed 4L NC.

## 2022-12-04 NOTE — ED PROVIDER NOTES
71-year-old male with history of stroke that leaves him bedfast presents today with productive cough of clear sputum low-grade fever at his wife is concerned for the possibility of pneumonia. He has presented this way with pneumonia before. No nausea vomiting or diarrhea. The history is provided by the patient. Cough  This is a new problem. The current episode started more than 2 days ago. The problem occurs constantly. The problem has not changed since onset. The cough is Non-productive. There has been a fever of 100 - 100.9 F. The fever has been present for 1 - 2 days. Associated symptoms include chills, sweats and shortness of breath. Pertinent negatives include no chest pain, no weight loss, no eye redness, no ear congestion, no ear pain, no headaches, no rhinorrhea, no sore throat, no myalgias, no wheezing and no nausea. He has tried nothing for the symptoms. He is not a smoker. His past medical history is significant for bronchitis and pneumonia. His past medical history does not include bronchiectasis, COPD, asthma, cancer, heart failure or CHF. Past Medical History:   Diagnosis Date    AF (paroxysmal atrial fibrillation) (HCC)     Anxiety and depression     Diabetes ([address]     Dysphagia as late effect of stroke     Hx of completed stroke     Hyperlipidemia     Hypertension     Hypothyroid     [facility]     Rheumatoid arthritis ([facility]        Past Surgical History:   Procedure Laterality Date    HX AMPUTATION Left     left 5 toes    HX HERNIA REPAIR           History reviewed. No pertinent family history.     Social History     Socioeconomic History    Marital status:      Spouse name: Not on file    Number of children: Not on file    Years of education: Not on file    Highest education level: Not on file   Occupational History    Not on file   Tobacco Use    Smoking status: Former    Smokeless tobacco: Never   Vaping Use    Vaping Use: Every day    Substances: THC    Devices: Pre-filled or refillable cartridge   Substance and Sexual Activity    Alcohol use: Never    Drug use: Yes     Types: Marijuana     Comment: vape pen and gummies    Sexual activity: Not on file   Other Topics Concern    Not on file   Social History Narrative    Not on file     Social Determinants of Health     Financial Resource Strain: Not on file   Food Insecurity: Not on file   Transportation Needs: Not on file   Physical Activity: Not on file   Stress: Not on file   Social Connections: Not on file   Intimate Partner Violence: Not on file   Housing Stability: Not on file         ALLERGIES: Amoxicillin and Augmentin [amoxicillin-pot clavulanate]    Review of Systems   Constitutional:  Positive for chills. Negative for weight loss. HENT:  Negative for ear pain, rhinorrhea and sore throat. Eyes:  Negative for redness. Respiratory:  Positive for cough and shortness of breath. Negative for wheezing. Cardiovascular:  Negative for chest pain. Gastrointestinal:  Negative for nausea. Musculoskeletal:  Negative for myalgias. Neurological:  Negative for headaches. Vitals:    12/04/22 0946   BP: (!) 145/65   Pulse: 85   Resp: 20   Temp: 99.1 °F (37.3 °C)   SpO2: (!) 85%            Physical Exam  Vitals and nursing note reviewed. Constitutional:       Appearance: Normal appearance. He is well-developed. HENT:      Head: Normocephalic and atraumatic. Cardiovascular:      Rate and Rhythm: Normal rate and regular rhythm. Pulses: Normal pulses. Heart sounds: Normal heart sounds. Pulmonary:      Effort: Pulmonary effort is normal. No respiratory distress. Breath sounds: Normal breath sounds. Chest:      Chest wall: No tenderness. Abdominal:      General: Bowel sounds are normal.      Palpations: Abdomen is soft. Tenderness: There is no abdominal tenderness. There is no guarding or rebound.    Musculoskeletal:      Cervical back: Full passive range of motion without pain, normal range of motion and neck supple. Skin:     General: Skin is warm and dry. Findings: No erythema or rash. Neurological:      Mental Status: He is alert and oriented to person, place, and time. Comments: Flexion contractures of the legs are noted. Patient requiring 100% assistance to get from wheelchair to bed. Psychiatric:         Speech: Speech normal.         Behavior: Behavior normal.         Thought Content: Thought content normal.         Judgment: Judgment normal.        MDM  Number of Diagnoses or Management Options  Acute cough  Fever, unspecified fever cause  Weakness  Diagnosis management comments: Cough and possible pneumonia of the patient at very high risk for that. Recent Results (from the past 24 hour(s))   CULTURE, BLOOD, PAIRED    Collection Time: 12/04/22 10:11 AM    Specimen: Blood   Result Value Ref Range    Special Requests: NO SPECIAL REQUESTS      Culture result: NO GROWTH AFTER 18 HOURS     CBC WITH AUTOMATED DIFF    Collection Time: 12/04/22 10:12 AM   Result Value Ref Range    WBC 11.5 (H) 4.1 - 11.1 K/uL    RBC 3.43 (L) 4.10 - 5.70 M/uL    HGB 9.6 (L) 12.1 - 17.0 g/dL    HCT 29.5 (L) 36.6 - 50.3 %    MCV 86.0 80.0 - 99.0 FL    MCH 28.0 26.0 - 34.0 PG    MCHC 32.5 30.0 - 36.5 g/dL    RDW 15.8 (H) 11.5 - 14.5 %    PLATELET 625 329 - 879 K/uL    MPV 12.5 8.9 - 12.9 FL    NRBC 0.0 0.0  WBC    ABSOLUTE NRBC 0.00 0.00 - 0.01 K/uL    NEUTROPHILS 83 (H) 32 - 75 %    LYMPHOCYTES 10 (L) 12 - 49 %    MONOCYTES 6 5 - 13 %    EOSINOPHILS 1 0 - 7 %    BASOPHILS 1 0 - 1 %    IMMATURE GRANULOCYTES 0 0 - 0.5 %    ABS. NEUTROPHILS 9.5 (H) 1.8 - 8.0 K/UL    ABS. LYMPHOCYTES 1.2 0.8 - 3.5 K/UL    ABS. MONOCYTES 0.6 0.0 - 1.0 K/UL    ABS. EOSINOPHILS 0.1 0.0 - 0.4 K/UL    ABS. BASOPHILS 0.1 0.0 - 0.1 K/UL    ABS. IMM.  GRANS. 0.0 0.00 - 0.04 K/UL    DF AUTOMATED     METABOLIC PANEL, COMPREHENSIVE    Collection Time: 12/04/22 10:12 AM Result Value Ref Range    Sodium 136 136 - 145 mmol/L    Potassium 4.5 3.5 - 5.1 mmol/L    Chloride 99 97 - 108 mmol/L    CO2 25 21 - 32 mmol/L    Anion gap 12 5 - 15 mmol/L    Glucose 260 (H) 65 - 100 mg/dL    BUN 68 (H) 6 - 20 MG/DL    Creatinine 1.60 (H) 0.70 - 1.30 MG/DL    BUN/Creatinine ratio 43 (H) 12 - 20      eGFR 47 (L) >60 ml/min/1.73m2    Calcium 8.9 8.5 - 10.1 MG/DL    Bilirubin, total 0.3 0.2 - 1.0 MG/DL    ALT (SGPT) 26 12 - 78 U/L    AST (SGOT) 29 15 - 37 U/L    Alk. phosphatase 198 (H) 45 - 117 U/L    Protein, total 7.6 6.4 - 8.2 g/dL    Albumin 3.2 (L) 3.5 - 5.0 g/dL    Globulin 4.4 (H) 2.0 - 4.0 g/dL    A-G Ratio 0.7 (L) 1.1 - 2.2     INFLUENZA A+B VIRAL AGS    Collection Time: 12/04/22 10:12 AM   Result Value Ref Range    Influenza A Antigen Negative NEG      Influenza B Antigen Negative NEG     COVID-19 RAPID TEST    Collection Time: 12/04/22 10:12 AM   Result Value Ref Range    Specimen source Nasopharyngeal      COVID-19 rapid test Not detected NOTD     LACTIC ACID    Collection Time: 12/04/22 10:12 AM   Result Value Ref Range    Lactic acid 1.6 0.4 - 2.0 MMOL/L        XR CHEST PORT   Final Result   The chronic left pleural effusion has increased in size, and is now   moderate, with left lower lobe atelectasis. Patient and wife were informed of labs and chest x-ray result. We will elect to treat him for bronchitis with antibiotics and inhaler. Wife is in agreement and wishes to take him home and not have him admitted today.   Procedures

## 2022-12-04 NOTE — ED NOTES
Wife states Pt \"looks much better. \" Dr Gricelda Chu in room speaking with pt and wife. \" Pt appears in no distress respirations are even and non labored.

## 2022-12-04 NOTE — ED NOTES
Pt was discharged and given instructions by Dr Hoa Wilson . Pts wife verbalized good understanding of all discharge instructions,prescriptions and F/U care. All questions answered. Pt in stable condition on discharge. Pt wheeled out to car.

## 2022-12-10 LAB
BACTERIA SPEC CULT: NORMAL
SERVICE CMNT-IMP: NORMAL

## 2023-01-09 ENCOUNTER — HOSPITAL ENCOUNTER (OUTPATIENT)
Dept: WOUND CARE | Age: 68
Discharge: HOME | End: 2023-01-09

## 2023-01-09 NOTE — DISCHARGE INSTRUCTIONS
Discharge Instructions for  [facility]  [place] [address], [address] [facility] [address]  Telephone: [phone]    NAME:  Dmitriy Ya OF BIRTH:  1955  ACCOUNT NUMBER :  [de-identified]  DATE: 1/9/2023    Wound Cleansing:   Do not scrub or use excessive force. Cleanse wound prior to applying a clean dressing with:      [x] Cleanse wound with: [place] and Spike baby shampoo lather leave 2-3 min then rinse with water      [] May Shower at Discharge     []  Shower on days of dressing change, remove dressing first    [] Keep Wound Dry in Shower (may purchase a cast cover if needed)     Topical Treatments:  Do not apply lotions, creams, or ointments to wound bed unless directed. [] Apply moisturizing lotion A&D ointment to skin surrounding the wound prior to dressing change.  [] Apply antifungal ointment to skin surrounding the wound prior to dressing change.  [] Apply thin film of Zinc barrier ointment to skin immediately around wound. [x] Other: Calmoseptine ointment     Dressings:           Wound Location Sacrum and Lower back     [x] Apply Primary Dressing:        [x] Other:  Calmoseptine ointment      [] Cover and Secure with:      [x] Other: Do not need to place sacral pads at this time, unless you notice more drainage     Avoid contact of tape with skin. [x] Change dressing: [x] Twice a day or as needed          Pressure Relief:  [] When sitting, shift position or do seat lifts every 15 minutes. [x] Wheelchair cushion [x] Specialty Bed/Mattress  [x] Turn every 2 hours when in bed. Avoid position directing pressure on wound site. Turned to the side as far over as you can go and he can tolerate.   Nathan Harshil to sit at a 90 degree angle would be more beneficial. Not to be on the midline reclined position for more then 1 hr at a time and off for a 1/2 hr.       Off-Loading:   [x] Off-loading when [] walking  [x] in bed [x] sitting  [] Total non-weight bearing  [] Right Leg  [] Left Leg   [] Assistive Device [] Walker [] Cane  [] Wheelchair  [] Crutches   [] Surgical shoe    [] Podus Boot(s)   [] Foam Boot(s)  [] Roll About    [] Cast Boot [] CROW Boot  [] Other:       Activity:  [x] Activity as tolerated            Return Appointment:    [] Nurse visit scheduled in *** day(s) or *** week(s)   [x] Return Appointment: With Dr. Delphine Aguilar in  [facility])  [] Ordered tests: {[place] testing :67841}     Electronically signed on 1/9/2023    [address] [place] [address] Information: Should you experience any significant changes in your wound(s) or have questions about your wound care, please contact the [facility] at [address] [place] [address] 8:00 am - 4:30. If you need help with your wound outside these hours and cannot wait until we are again available, contact your PCP or go to the hospital emergency room. PLEASE NOTE: IF YOU ARE UNABLE TO OBTAIN WOUND SUPPLIES, CONTINUE TO USE THE SUPPLIES YOU HAVE AVAILABLE UNTIL YOU ARE ABLE TO REACH US. IT IS MOST IMPORTANT TO KEEP THE WOUND COVERED AT ALL TIMES.      Physician Signature:_______________________ Dr. Delphine Aguilar

## 2023-03-03 ENCOUNTER — HOSPITAL ENCOUNTER (OUTPATIENT)
Dept: GENERAL RADIOLOGY | Age: 68
Discharge: HOME | End: 2023-03-03
Attending: FAMILY MEDICINE
Payer: MEDICARE

## 2023-03-03 DIAGNOSIS — R13.10 DYSPHAGIA: ICD-10-CM

## 2023-03-03 DIAGNOSIS — I63.9 CVA (CEREBRAL VASCULAR ACCIDENT) (HCC): ICD-10-CM

## 2023-03-03 PROCEDURE — 92611 MOTION FLUOROSCOPY/SWALLOW: CPT

## 2023-03-03 PROCEDURE — 74230 X-RAY XM SWLNG FUNCJ C+: CPT

## 2023-03-03 NOTE — PROGRESS NOTES
[address]  [address] [facility] [place], [address] [employer] [facility]  Patient: Jose Connell (89 y.o. male)  Date: 3/3/2023  Referring Provider:  Ileana Hart:   Patient's wife reports that patient had a large CVA July 2021. He had  PEG. Was eating chopped food by September, but then had regression, with aspiration PNAs. Last aspiration PNA was in April 2022. He is currently working with OP SLP, using NMES and ice chips. Wife reports occasional bites of ap sauce and pudding. OBJECTIVE:   Past Medical History:     Past Medical History:   Diagnosis Date    AF (paroxysmal atrial fibrillation) ([facility]     Anxiety and depression     Diabetes ([facility]     Dysphagia as late effect of stroke     Hx of completed stroke     Hyperlipidemia     Hypertension     Hypothyroid     PEG (percutaneous endoscopic gastrostomy) status (HCC)     Rheumatoid arthritis ([facility]      Past Surgical History:   Procedure Laterality Date    HX AMPUTATION Left     left 5 toes    HX HERNIA REPAIR       Current Dietary Status:  NPO x ice  Radiologist: Amada Cassette  Film Views: Lateral  Patient Position: upright in [place] chair    Trial 1: Trial 2:   Consistency Presented: Thin liquid;Puree     How Presented: SLP-fed/presented;Spoon;Straw      ORAL PHASE:      Bolus Acceptance: Impaired (head tilted to R,)     Bolus Formation/Control: Impaired (some oral residue on R lateral sulcus with head tilt and residue on R tongue):  Anterior;Posterior (cleared 90% of thins wtih a-p propulsion, but 75% with purees)  :     Propulsion: Discoordination;Delayed (# of seconds)     Oral Residue: Right;Lingual;Pocketing  PHARYNGEAL PHASE:      Initiation of Swallow: Triggered at vallecula     Timing: Pooling 1-5 sec     Penetration: None     Aspiration/Timing: No evidence of aspiration     Pharyngeal Clearance:  (none after the swallow, but he did have mild oral residue that spilled to the valleculae after the swallow without awareness or immediate response)     Attempted Modifications:  (independently used chin tuck)     Effective Modifications: Chin tuck                   Trial 3: Trial 4:                            :    :                                                                        Decreased Tongue Base Retraction?: No  Laryngeal Elevation: WFL (within functional limits)  Aspiration/Penetration Score: 1 (No penetration or aspiration-Contrast does not enter the airway)  Pharyngeal Symmetry: Not assessed                  ASSESSMENT :  Based on the objective data described above, the patient presents with functional oral-pharyngeal swallow for small sips, tsp of thins and purees. No aspiration or penetration. He did have R oral residue that then spilled to pharynx without awareness, giving him aspiration risks after the swallow. Emphasized oral care with patient and wife, who report that he can rinse and spit, which he did successfully after MBS. He is ready for starting PO with SLP supervision. PLAN/RECOMMENDATIONS :  Defer to primary SLP for consistency and exact amount to trial in therapy and at home. Advance amount and consistency gradually. Oral care after any PO. Upright after PO to avoid risk for reflux. COMMUNICATION/EDUCATION:   The above findings and recommendations were discussed with:  patient and his wife  who verbalized understanding.     Thank you for this referral.  Jonel Mills, SLP  Time Calculation: 30 mins

## 2023-04-06 ENCOUNTER — HOSPITAL ENCOUNTER (OUTPATIENT)
Age: 68
End: 2023-04-06
Attending: EMERGENCY MEDICINE
Payer: MEDICARE

## 2023-04-07 NOTE — ED PROVIDER NOTES
The history is provided by a relative. Other  This is a new problem. Episode onset: gastrostomy tube fell out about an hour ago. 20 Fr that had been in for about a year. The problem occurs constantly. The problem has not changed since onset. Pertinent negatives include no abdominal pain. Nothing aggravates the symptoms. Nothing relieves the symptoms. Past Medical History:   Diagnosis Date    AF (paroxysmal atrial fibrillation) (HCC)     Anxiety and depression     Diabetes ([facility]     Dysphagia as late effect of stroke     Hx of completed stroke     Hyperlipidemia     Hypertension     Hypothyroid     PEG (percutaneous endoscopic gastrostomy) status ([facility]     Rheumatoid arthritis ([facility]        Past Surgical History:   Procedure Laterality Date    HX AMPUTATION Left     left 5 toes    HX HERNIA REPAIR           No family history on file.     Social History     Socioeconomic History    Marital status:      Spouse name: Not on file    Number of children: Not on file    Years of education: Not on file    Highest education level: Not on file   Occupational History    Not on file   Tobacco Use    Smoking status: Former    Smokeless tobacco: Never   Vaping Use    Vaping Use: Every day    Substances: THC    Devices: Pre-filled or refillable cartridge   Substance and Sexual Activity    Alcohol use: Never    Drug use: Yes     Types: Marijuana     Comment: vape pen and gummies    Sexual activity: Not on file   Other Topics Concern    Not on file   Social History Narrative    Not on file     Social Determinants of Health     Financial Resource Strain: Not on file   Food Insecurity: Not on file   Transportation Needs: Not on file   Physical Activity: Not on file   Stress: Not on file   Social Connections: Not on file   Intimate Partner Violence: Not on file   Housing Stability: Not on file         ALLERGIES: Amoxicillin and Augmentin [amoxicillin-pot clavulanate]    Review of Systems   Gastrointestinal:  Negative for abdominal pain. Vitals:    04/06/23 2151   BP: (!) 158/65   Pulse: (!) 59   Resp: 16   Temp: 98.6 °F (37 °C)   SpO2: 95%   Weight: 68 kg (150 lb)   Height: 5' 11\" (1.803 m)            Physical Exam  Vitals and nursing note reviewed. Constitutional:       General: He is not in acute distress. Appearance: He is well-developed. HENT:      Head: Normocephalic and atraumatic. Eyes:      Conjunctiva/sclera: Conjunctivae normal.   Neck:      Trachea: No tracheal deviation. Cardiovascular:      Rate and Rhythm: Normal rate and regular rhythm. Pulmonary:      Effort: Pulmonary effort is normal. No respiratory distress. Abdominal:      General: There is no distension. Comments: Well formed left upper abdominal gastrostomy site that is c/d/i   Musculoskeletal:         General: No deformity. Normal range of motion. Cervical back: Neck supple. Skin:     General: Skin is warm and dry. Neurological:      Mental Status: He is alert. Cranial Nerves: No cranial nerve deficit. Psychiatric:         Behavior: Behavior normal.        Medical Decision Making  79 y.o. male presents with inadvertent dislodgement of gastrostomy tube. No abdominal pain or signs of complication with well formed tract. Replaced with rose marie-key 20 Fr 4.5 cm tube with only mild mucosal bleeding on insertion. Plan to follow up with PCP as needed and return precautions discussed for worsening or new concerning symptoms. n    Problems Addressed:  Complication of gastrostomy tube [facility]): acute illness or injury    Amount and/or Complexity of Data Reviewed  Independent Historian: caregiver           Procedures    Procedure note: Gastric tube placement  Area was cleaned and 20 French replacement tube introduced with return of gastric contents. Balloon inflated and appropriately placed. Flushed without difficulty. Patient without any signs of complication.

## 2023-04-07 NOTE — ED NOTES
Pt. Discharged by provider. Used radha lift to assist patient into personal wheelchair. Pt. Drove himself out on electric wheelchair with family.

## 2023-04-07 NOTE — ED TRIAGE NOTES
Pt presents to the ED via EMS who states that patient removed his peg tube after vomiting up mucus. Per EMS, the peg tube came out around 8:00 PM. Pt denies any abdominal pain, nausea, vomiting.

## 2023-05-03 ENCOUNTER — HOSPITAL ENCOUNTER (EMERGENCY)
Age: 68
Discharge: HOME | End: 2023-05-03
Attending: EMERGENCY MEDICINE
Payer: MEDICARE

## 2023-05-03 VITALS
TEMPERATURE: 98.3 F | SYSTOLIC BLOOD PRESSURE: 148 MMHG | RESPIRATION RATE: 16 BRPM | OXYGEN SATURATION: 98 % | HEART RATE: 59 BPM | DIASTOLIC BLOOD PRESSURE: 98 MMHG

## 2023-05-03 DIAGNOSIS — R23.8 SKIN IRRITATION: Primary | ICD-10-CM

## 2023-05-03 PROCEDURE — 99283 EMERGENCY DEPT VISIT LOW MDM: CPT

## 2023-05-03 RX ORDER — NYSTATIN 100000 [USP'U]/G
30 POWDER TOPICAL 4 TIMES DAILY
Qty: 3600 G | Refills: 0 | Status: SHIPPED | OUTPATIENT
Start: 2023-05-03 | End: 2023-06-02

## 2024-01-01 ENCOUNTER — HOME CARE VISIT (OUTPATIENT)
Facility: HOME HEALTH | Age: 69
End: 2024-01-01
Payer: MEDICARE

## 2024-01-01 ENCOUNTER — HOME CARE VISIT (OUTPATIENT)
Age: 69
End: 2024-01-01
Payer: MEDICARE

## 2024-01-01 ENCOUNTER — HOSPICE ADMISSION (OUTPATIENT)
Age: 69
End: 2024-01-01
Payer: MEDICARE

## 2024-01-01 VITALS
OXYGEN SATURATION: 95 % | SYSTOLIC BLOOD PRESSURE: 127 MMHG | HEART RATE: 74 BPM | DIASTOLIC BLOOD PRESSURE: 51 MMHG | TEMPERATURE: 97.1 F | RESPIRATION RATE: 14 BRPM

## 2024-01-01 VITALS
SYSTOLIC BLOOD PRESSURE: 100 MMHG | DIASTOLIC BLOOD PRESSURE: 52 MMHG | OXYGEN SATURATION: 98 % | HEART RATE: 87 BPM | RESPIRATION RATE: 12 BRPM

## 2024-01-01 VITALS — DIASTOLIC BLOOD PRESSURE: 48 MMHG | HEART RATE: 94 BPM | SYSTOLIC BLOOD PRESSURE: 120 MMHG | RESPIRATION RATE: 16 BRPM

## 2024-01-01 VITALS — HEART RATE: 82 BPM | RESPIRATION RATE: 20 BRPM | OXYGEN SATURATION: 96 %

## 2024-01-01 PROCEDURE — 0651 HSPC ROUTINE HOME CARE

## 2024-01-01 PROCEDURE — 2500000001 HSPC NON INJECTABLE MED

## 2024-01-01 PROCEDURE — G0299 HHS/HOSPICE OF RN EA 15 MIN: HCPCS

## 2024-01-01 PROCEDURE — G0156 HHCP-SVS OF AIDE,EA 15 MIN: HCPCS

## 2024-01-01 PROCEDURE — 3331090004 HSPC SERVICE INTENSITY ADD-ON

## 2024-01-01 PROCEDURE — G0300 HHS/HOSPICE OF LPN EA 15 MIN: HCPCS

## 2024-01-01 ASSESSMENT — ENCOUNTER SYMPTOMS
CONSTIPATION: 1
BOWEL INCONTINENCE: 1
STOOL DESCRIPTION: SMALL
CONSTIPATION: 1

## 2024-03-15 ENCOUNTER — APPOINTMENT (OUTPATIENT)
Facility: HOSPITAL | Age: 69
DRG: 177 | End: 2024-03-15
Payer: MEDICARE

## 2024-03-15 ENCOUNTER — HOSPITAL ENCOUNTER (INPATIENT)
Facility: HOSPITAL | Age: 69
LOS: 6 days | Discharge: HOME | DRG: 177 | End: 2024-03-21
Attending: EMERGENCY MEDICINE | Admitting: HOSPITALIST
Payer: MEDICARE

## 2024-03-15 DIAGNOSIS — E87.5 HYPERKALEMIA: ICD-10-CM

## 2024-03-15 DIAGNOSIS — R41.82 ALTERED MENTAL STATUS, UNSPECIFIED ALTERED MENTAL STATUS TYPE: Primary | ICD-10-CM

## 2024-03-15 DIAGNOSIS — R53.1 GENERAL WEAKNESS: ICD-10-CM

## 2024-03-15 DIAGNOSIS — R50.9 FEBRILE ILLNESS: ICD-10-CM

## 2024-03-15 LAB
ANION GAP SERPL CALC-SCNC: 8 MMOL/L (ref 5–15)
APPEARANCE UR: ABNORMAL
BACTERIA URNS QL MICRO: ABNORMAL /HPF
BASOPHILS # BLD: 0 K/UL (ref 0–0.1)
BASOPHILS NFR BLD: 0 % (ref 0–1)
BILIRUB UR QL CFM: NEGATIVE
BUN SERPL-MCNC: 41 MG/DL (ref 6–20)
BUN/CREAT SERPL: 24 (ref 12–20)
CALCIUM SERPL-MCNC: 9.7 MG/DL (ref 8.5–10.1)
CHLORIDE SERPL-SCNC: 100 MMOL/L (ref 97–108)
CO2 SERPL-SCNC: 26 MMOL/L (ref 21–32)
COLOR UR: ABNORMAL
COMMENT:: NORMAL
CREAT SERPL-MCNC: 1.72 MG/DL (ref 0.7–1.3)
DIFFERENTIAL METHOD BLD: ABNORMAL
EOSINOPHIL # BLD: 0 K/UL (ref 0–0.4)
EOSINOPHIL NFR BLD: 0 % (ref 0–7)
EPITH CASTS URNS QL MICRO: ABNORMAL /LPF
ERYTHROCYTE [DISTWIDTH] IN BLOOD BY AUTOMATED COUNT: 16.4 % (ref 11.5–14.5)
FLUAV RNA SPEC QL NAA+PROBE: NOT DETECTED
FLUBV RNA SPEC QL NAA+PROBE: NOT DETECTED
GLUCOSE BLD STRIP.AUTO-MCNC: 180 MG/DL (ref 65–117)
GLUCOSE BLD STRIP.AUTO-MCNC: 181 MG/DL (ref 65–117)
GLUCOSE BLD STRIP.AUTO-MCNC: 74 MG/DL (ref 65–117)
GLUCOSE SERPL-MCNC: 172 MG/DL (ref 65–100)
GLUCOSE UR STRIP.AUTO-MCNC: NEGATIVE MG/DL
HCT VFR BLD AUTO: 27.8 % (ref 36.6–50.3)
HGB BLD-MCNC: 9 G/DL (ref 12.1–17)
HGB UR QL STRIP: ABNORMAL
IMM GRANULOCYTES # BLD AUTO: 0 K/UL (ref 0–0.04)
IMM GRANULOCYTES NFR BLD AUTO: 0 % (ref 0–0.5)
KETONES UR QL STRIP.AUTO: ABNORMAL MG/DL
LACTATE BLD-SCNC: 1.17 MMOL/L (ref 0.4–2)
LEUKOCYTE ESTERASE UR QL STRIP.AUTO: ABNORMAL
LYMPHOCYTES # BLD: 1.5 K/UL (ref 0.8–3.5)
LYMPHOCYTES NFR BLD: 13 % (ref 12–49)
MCH RBC QN AUTO: 27.4 PG (ref 26–34)
MCHC RBC AUTO-ENTMCNC: 32.4 G/DL (ref 30–36.5)
MCV RBC AUTO: 84.5 FL (ref 80–99)
MONOCYTES # BLD: 0.9 K/UL (ref 0–1)
MONOCYTES NFR BLD: 8 % (ref 5–13)
NEUTS SEG # BLD: 8.6 K/UL (ref 1.8–8)
NEUTS SEG NFR BLD: 79 % (ref 32–75)
NITRITE UR QL STRIP.AUTO: NEGATIVE
NRBC # BLD: 0 K/UL (ref 0–0.01)
NRBC BLD-RTO: 0 PER 100 WBC
PH UR STRIP: 5 (ref 5–8)
PLATELET # BLD AUTO: 170 K/UL (ref 150–400)
PMV BLD AUTO: 12.4 FL (ref 8.9–12.9)
POTASSIUM SERPL-SCNC: 5.8 MMOL/L (ref 3.5–5.1)
PROCALCITONIN SERPL-MCNC: 0.29 NG/ML
PROT UR STRIP-MCNC: ABNORMAL MG/DL
RBC # BLD AUTO: 3.29 M/UL (ref 4.1–5.7)
RBC #/AREA URNS HPF: ABNORMAL /HPF (ref 0–5)
SARS-COV-2 RNA RESP QL NAA+PROBE: NOT DETECTED
SERVICE CMNT-IMP: ABNORMAL
SERVICE CMNT-IMP: ABNORMAL
SERVICE CMNT-IMP: NORMAL
SODIUM SERPL-SCNC: 134 MMOL/L (ref 136–145)
SP GR UR REFRACTOMETRY: 1.02 (ref 1–1.03)
SPECIMEN HOLD: NORMAL
TROPONIN I SERPL HS-MCNC: 36 NG/L (ref 0–76)
URINE CULTURE IF INDICATED: ABNORMAL
UROBILINOGEN UR QL STRIP.AUTO: 1 EU/DL (ref 0.2–1)
WBC # BLD AUTO: 11 K/UL (ref 4.1–11.1)
WBC URNS QL MICRO: ABNORMAL /HPF (ref 0–4)

## 2024-03-15 PROCEDURE — 70450 CT HEAD/BRAIN W/O DYE: CPT

## 2024-03-15 PROCEDURE — 36415 COLL VENOUS BLD VENIPUNCTURE: CPT

## 2024-03-15 PROCEDURE — 85025 COMPLETE CBC W/AUTO DIFF WBC: CPT

## 2024-03-15 PROCEDURE — 71045 X-RAY EXAM CHEST 1 VIEW: CPT

## 2024-03-15 PROCEDURE — 2580000003 HC RX 258: Performed by: HOSPITALIST

## 2024-03-15 PROCEDURE — 83605 ASSAY OF LACTIC ACID: CPT

## 2024-03-15 PROCEDURE — 87086 URINE CULTURE/COLONY COUNT: CPT

## 2024-03-15 PROCEDURE — 93005 ELECTROCARDIOGRAM TRACING: CPT | Performed by: EMERGENCY MEDICINE

## 2024-03-15 PROCEDURE — 84145 PROCALCITONIN (PCT): CPT

## 2024-03-15 PROCEDURE — 81001 URINALYSIS AUTO W/SCOPE: CPT

## 2024-03-15 PROCEDURE — 99285 EMERGENCY DEPT VISIT HI MDM: CPT

## 2024-03-15 PROCEDURE — 6370000000 HC RX 637 (ALT 250 FOR IP): Performed by: EMERGENCY MEDICINE

## 2024-03-15 PROCEDURE — 80048 BASIC METABOLIC PNL TOTAL CA: CPT

## 2024-03-15 PROCEDURE — 6370000000 HC RX 637 (ALT 250 FOR IP): Performed by: HOSPITALIST

## 2024-03-15 PROCEDURE — 84484 ASSAY OF TROPONIN QUANT: CPT

## 2024-03-15 PROCEDURE — 87636 SARSCOV2 & INF A&B AMP PRB: CPT

## 2024-03-15 PROCEDURE — 1100000000 HC RM PRIVATE

## 2024-03-15 PROCEDURE — 87040 BLOOD CULTURE FOR BACTERIA: CPT

## 2024-03-15 PROCEDURE — 87077 CULTURE AEROBIC IDENTIFY: CPT

## 2024-03-15 PROCEDURE — 87186 SC STD MICRODIL/AGAR DIL: CPT

## 2024-03-15 PROCEDURE — 96374 THER/PROPH/DIAG INJ IV PUSH: CPT

## 2024-03-15 PROCEDURE — 6360000002 HC RX W HCPCS: Performed by: HOSPITALIST

## 2024-03-15 PROCEDURE — 82962 GLUCOSE BLOOD TEST: CPT

## 2024-03-15 PROCEDURE — 2580000003 HC RX 258: Performed by: EMERGENCY MEDICINE

## 2024-03-15 RX ORDER — DEXTROSE AND SODIUM CHLORIDE 10; .45 G/100ML; G/100ML
INJECTION, SOLUTION INTRAVENOUS CONTINUOUS
Status: DISCONTINUED | OUTPATIENT
Start: 2024-03-15 | End: 2024-03-15

## 2024-03-15 RX ORDER — ARFORMOTEROL TARTRATE 15 UG/2ML
15 SOLUTION RESPIRATORY (INHALATION)
Status: DISCONTINUED | OUTPATIENT
Start: 2024-03-15 | End: 2024-03-21 | Stop reason: HOSPADM

## 2024-03-15 RX ORDER — IPRATROPIUM BROMIDE AND ALBUTEROL SULFATE 2.5; .5 MG/3ML; MG/3ML
1 SOLUTION RESPIRATORY (INHALATION)
Status: DISCONTINUED | OUTPATIENT
Start: 2024-03-15 | End: 2024-03-16

## 2024-03-15 RX ORDER — ONDANSETRON 2 MG/ML
4 INJECTION INTRAMUSCULAR; INTRAVENOUS EVERY 6 HOURS PRN
Status: DISCONTINUED | OUTPATIENT
Start: 2024-03-15 | End: 2024-03-21 | Stop reason: HOSPADM

## 2024-03-15 RX ORDER — INSULIN LISPRO 100 [IU]/ML
0-4 INJECTION, SOLUTION INTRAVENOUS; SUBCUTANEOUS EVERY 6 HOURS
Status: DISCONTINUED | OUTPATIENT
Start: 2024-03-16 | End: 2024-03-16

## 2024-03-15 RX ORDER — FAMOTIDINE 20 MG/1
20 TABLET, FILM COATED ORAL DAILY
Status: DISCONTINUED | OUTPATIENT
Start: 2024-03-16 | End: 2024-03-21 | Stop reason: HOSPADM

## 2024-03-15 RX ORDER — DIAZEPAM 5 MG/ML
2.5 INJECTION, SOLUTION INTRAMUSCULAR; INTRAVENOUS ONCE
Status: COMPLETED | OUTPATIENT
Start: 2024-03-15 | End: 2024-03-16

## 2024-03-15 RX ORDER — DEXTROSE AND SODIUM CHLORIDE 5; .9 G/100ML; G/100ML
INJECTION, SOLUTION INTRAVENOUS CONTINUOUS
Status: DISCONTINUED | OUTPATIENT
Start: 2024-03-15 | End: 2024-03-16

## 2024-03-15 RX ORDER — POTASSIUM CHLORIDE 7.45 MG/ML
10 INJECTION INTRAVENOUS PRN
Status: DISCONTINUED | OUTPATIENT
Start: 2024-03-15 | End: 2024-03-21 | Stop reason: HOSPADM

## 2024-03-15 RX ORDER — DEXTROSE AND SODIUM CHLORIDE 5; .9 G/100ML; G/100ML
INJECTION, SOLUTION INTRAVENOUS CONTINUOUS
Status: DISCONTINUED | OUTPATIENT
Start: 2024-03-15 | End: 2024-03-15

## 2024-03-15 RX ORDER — ACETAMINOPHEN 650 MG/1
650 SUPPOSITORY RECTAL EVERY 6 HOURS PRN
Status: DISCONTINUED | OUTPATIENT
Start: 2024-03-15 | End: 2024-03-21 | Stop reason: HOSPADM

## 2024-03-15 RX ORDER — SODIUM CHLORIDE, SODIUM LACTATE, POTASSIUM CHLORIDE, AND CALCIUM CHLORIDE .6; .31; .03; .02 G/100ML; G/100ML; G/100ML; G/100ML
1000 INJECTION, SOLUTION INTRAVENOUS ONCE
Status: COMPLETED | OUTPATIENT
Start: 2024-03-15 | End: 2024-03-15

## 2024-03-15 RX ORDER — BUDESONIDE 0.5 MG/2ML
500 INHALANT ORAL
Status: DISCONTINUED | OUTPATIENT
Start: 2024-03-15 | End: 2024-03-21 | Stop reason: HOSPADM

## 2024-03-15 RX ORDER — FAMOTIDINE 40 MG/1
40 TABLET, FILM COATED ORAL DAILY
COMMUNITY

## 2024-03-15 RX ORDER — LEVOTHYROXINE SODIUM 0.07 MG/1
75 TABLET ORAL DAILY
Status: DISCONTINUED | OUTPATIENT
Start: 2024-03-16 | End: 2024-03-21 | Stop reason: HOSPADM

## 2024-03-15 RX ORDER — POTASSIUM CHLORIDE 750 MG/1
40 TABLET, FILM COATED, EXTENDED RELEASE ORAL PRN
Status: DISCONTINUED | OUTPATIENT
Start: 2024-03-15 | End: 2024-03-21 | Stop reason: HOSPADM

## 2024-03-15 RX ORDER — DEXTROSE AND SODIUM CHLORIDE 5; .9 G/100ML; G/100ML
INJECTION, SOLUTION INTRAVENOUS ONCE
Status: DISCONTINUED | OUTPATIENT
Start: 2024-03-15 | End: 2024-03-15

## 2024-03-15 RX ORDER — ATORVASTATIN CALCIUM 20 MG/1
40 TABLET, FILM COATED ORAL
Status: DISCONTINUED | OUTPATIENT
Start: 2024-03-15 | End: 2024-03-21 | Stop reason: HOSPADM

## 2024-03-15 RX ORDER — SODIUM CHLORIDE 9 MG/ML
INJECTION, SOLUTION INTRAVENOUS PRN
Status: DISCONTINUED | OUTPATIENT
Start: 2024-03-15 | End: 2024-03-21 | Stop reason: HOSPADM

## 2024-03-15 RX ORDER — MIDODRINE HYDROCHLORIDE 5 MG/1
2.5 TABLET ORAL 2 TIMES DAILY
Status: DISCONTINUED | OUTPATIENT
Start: 2024-03-15 | End: 2024-03-21 | Stop reason: HOSPADM

## 2024-03-15 RX ORDER — DANTROLENE SODIUM 25 MG/1
25 CAPSULE ORAL 3 TIMES DAILY
Status: DISCONTINUED | OUTPATIENT
Start: 2024-03-15 | End: 2024-03-21 | Stop reason: HOSPADM

## 2024-03-15 RX ORDER — CARVEDILOL 12.5 MG/1
12.5 TABLET ORAL 2 TIMES DAILY
Status: DISCONTINUED | OUTPATIENT
Start: 2024-03-15 | End: 2024-03-21 | Stop reason: HOSPADM

## 2024-03-15 RX ORDER — MAGNESIUM SULFATE IN WATER 40 MG/ML
2000 INJECTION, SOLUTION INTRAVENOUS PRN
Status: DISCONTINUED | OUTPATIENT
Start: 2024-03-15 | End: 2024-03-21 | Stop reason: HOSPADM

## 2024-03-15 RX ORDER — SODIUM CHLORIDE 0.9 % (FLUSH) 0.9 %
5-40 SYRINGE (ML) INJECTION PRN
Status: DISCONTINUED | OUTPATIENT
Start: 2024-03-15 | End: 2024-03-21 | Stop reason: HOSPADM

## 2024-03-15 RX ORDER — DEXTROSE MONOHYDRATE 50 MG/ML
INJECTION, SOLUTION INTRAVENOUS
Status: COMPLETED | OUTPATIENT
Start: 2024-03-15 | End: 2024-03-15

## 2024-03-15 RX ORDER — DEXTROSE MONOHYDRATE 100 MG/ML
INJECTION, SOLUTION INTRAVENOUS CONTINUOUS PRN
Status: DISCONTINUED | OUTPATIENT
Start: 2024-03-15 | End: 2024-03-21 | Stop reason: HOSPADM

## 2024-03-15 RX ORDER — POLYETHYLENE GLYCOL 3350 17 G/17G
17 POWDER, FOR SOLUTION ORAL DAILY PRN
Status: DISCONTINUED | OUTPATIENT
Start: 2024-03-15 | End: 2024-03-21 | Stop reason: HOSPADM

## 2024-03-15 RX ORDER — SODIUM CHLORIDE 0.9 % (FLUSH) 0.9 %
5-40 SYRINGE (ML) INJECTION EVERY 12 HOURS SCHEDULED
Status: DISCONTINUED | OUTPATIENT
Start: 2024-03-15 | End: 2024-03-21 | Stop reason: HOSPADM

## 2024-03-15 RX ORDER — FOLIC ACID 1 MG/1
1 TABLET ORAL DAILY
Status: DISCONTINUED | OUTPATIENT
Start: 2024-03-16 | End: 2024-03-21 | Stop reason: HOSPADM

## 2024-03-15 RX ORDER — ONDANSETRON 4 MG/1
4 TABLET, ORALLY DISINTEGRATING ORAL EVERY 8 HOURS PRN
Status: DISCONTINUED | OUTPATIENT
Start: 2024-03-15 | End: 2024-03-21 | Stop reason: HOSPADM

## 2024-03-15 RX ORDER — ACETAMINOPHEN 325 MG/1
650 TABLET ORAL EVERY 6 HOURS PRN
Status: DISCONTINUED | OUTPATIENT
Start: 2024-03-15 | End: 2024-03-21 | Stop reason: HOSPADM

## 2024-03-15 RX ORDER — INSULIN GLARGINE 100 [IU]/ML
14 INJECTION, SOLUTION SUBCUTANEOUS DAILY
Status: DISCONTINUED | OUTPATIENT
Start: 2024-03-16 | End: 2024-03-21

## 2024-03-15 RX ORDER — ENOXAPARIN SODIUM 100 MG/ML
40 INJECTION SUBCUTANEOUS DAILY
Status: DISCONTINUED | OUTPATIENT
Start: 2024-03-16 | End: 2024-03-19

## 2024-03-15 RX ORDER — ASPIRIN 81 MG/1
81 TABLET, CHEWABLE ORAL DAILY
Status: DISCONTINUED | OUTPATIENT
Start: 2024-03-16 | End: 2024-03-21 | Stop reason: HOSPADM

## 2024-03-15 RX ORDER — ALPRAZOLAM 0.25 MG/1
0.25 TABLET ORAL DAILY PRN
COMMUNITY

## 2024-03-15 RX ADMIN — TRAZODONE HYDROCHLORIDE 150 MG: 100 TABLET ORAL at 23:47

## 2024-03-15 RX ADMIN — DEXTROSE MONOHYDRATE: 5 INJECTION, SOLUTION INTRAVENOUS at 21:26

## 2024-03-15 RX ADMIN — DEXTROSE MONOHYDRATE 250 ML: 100 INJECTION, SOLUTION INTRAVENOUS at 23:12

## 2024-03-15 RX ADMIN — DILTIAZEM HYDROCHLORIDE 60 MG: 30 TABLET ORAL at 23:46

## 2024-03-15 RX ADMIN — CARVEDILOL 12.5 MG: 12.5 TABLET, FILM COATED ORAL at 23:46

## 2024-03-15 RX ADMIN — PIPERACILLIN AND TAZOBACTAM 4500 MG: 4; .5 INJECTION, POWDER, LYOPHILIZED, FOR SOLUTION INTRAVENOUS; PARENTERAL at 23:37

## 2024-03-15 RX ADMIN — ATORVASTATIN CALCIUM 40 MG: 20 TABLET, FILM COATED ORAL at 23:46

## 2024-03-15 RX ADMIN — DANTROLENE SODIUM 25 MG: 25 CAPSULE ORAL at 23:47

## 2024-03-15 RX ADMIN — MIDODRINE HYDROCHLORIDE 2.5 MG: 5 TABLET ORAL at 23:46

## 2024-03-15 RX ADMIN — INSULIN HUMAN 10 UNITS: 100 INJECTION, SOLUTION PARENTERAL at 21:24

## 2024-03-15 RX ADMIN — SODIUM CHLORIDE, POTASSIUM CHLORIDE, SODIUM LACTATE AND CALCIUM CHLORIDE 1000 ML: 600; 310; 30; 20 INJECTION, SOLUTION INTRAVENOUS at 22:09

## 2024-03-15 NOTE — ED TRIAGE NOTES
EMS reports being called out for fever. Family reported giving patient tylenol prior to calling EMS. Family reports patient being altered one hour prior to calling EMS around 1600.

## 2024-03-15 NOTE — ED PROVIDER NOTES
including but not limited to hypoglycemia, infectious process, electrolyte abnormality, head injury and intoxicants.    Medical Decision Making  The patient was placed into an examination in room.    Nursing notes were reviewed.    The patient was interviewed and an examination was completed by me with the above findings.        MDM:    This is a 68-year-old male who presents to the ED via EMS from home for complaint of a fever.  He has a history sniffer previous stroke, diabetes, A-fib with a watchman's, hyperlipidemia, hypertension.  Came in for low-grade temperatures starting yesterday and worsening altered mental status today.  He also had 1 episode of nonbloody emesis but had a choking episode during this time because of his dysphagia secondary to his stroke.  Family is at bedside and states that he does not appear to be at baseline.    CBC did show anemia, with a normal white blood cell count.  Troponin was 36.  Lactic acid returned within normal limits.  Chemistry did show elevated creatinine at baseline and an elevated potassium.  He is given IV fluids, and insulin and D50 for his hyperkalemia.  EKG did not show any peaked T waves.  COVID and flu were negative.  CT of the head did not show any acute findings to blood radiology.  Chest x-ray did show a collapse of the left lower lobe of the lung that may be chronic versus infectious.  EKG shows a atrial flutter rhythm with without any acute ST segment elevation    This findings are discussed with the family at bedside.  Patient does not appear to be at baseline per family.  Patient is not able to answer yes/no questions by nodding his head.      Patient care was discussed with Dr. Padilla for admission via perfect serve.    Perfect Serve Consult for Admission  9:47 PM    ED Room Number: ER08/08  Patient Name and age:  Hayder Fink 68 y.o.  male  Working Diagnosis: Altered mental status, unspecified altered mental status type  (primary encounter

## 2024-03-16 ENCOUNTER — APPOINTMENT (OUTPATIENT)
Facility: HOSPITAL | Age: 69
DRG: 177 | End: 2024-03-16
Payer: MEDICARE

## 2024-03-16 LAB
ANION GAP SERPL CALC-SCNC: 9 MMOL/L (ref 5–15)
BASOPHILS # BLD: 0 K/UL (ref 0–0.1)
BASOPHILS NFR BLD: 0 % (ref 0–1)
BUN SERPL-MCNC: 39 MG/DL (ref 6–20)
BUN/CREAT SERPL: 27 (ref 12–20)
CALCIUM SERPL-MCNC: 8.8 MG/DL (ref 8.5–10.1)
CHLORIDE SERPL-SCNC: 101 MMOL/L (ref 97–108)
CO2 SERPL-SCNC: 21 MMOL/L (ref 21–32)
CREAT SERPL-MCNC: 1.47 MG/DL (ref 0.7–1.3)
DIFFERENTIAL METHOD BLD: ABNORMAL
EOSINOPHIL # BLD: 0.1 K/UL (ref 0–0.4)
EOSINOPHIL NFR BLD: 1 % (ref 0–7)
ERYTHROCYTE [DISTWIDTH] IN BLOOD BY AUTOMATED COUNT: 16.5 % (ref 11.5–14.5)
EST. AVERAGE GLUCOSE BLD GHB EST-MCNC: 160 MG/DL
GLUCOSE BLD STRIP.AUTO-MCNC: 241 MG/DL (ref 65–117)
GLUCOSE BLD STRIP.AUTO-MCNC: 311 MG/DL (ref 65–117)
GLUCOSE BLD STRIP.AUTO-MCNC: 387 MG/DL (ref 65–117)
GLUCOSE BLD STRIP.AUTO-MCNC: 84 MG/DL (ref 65–117)
GLUCOSE SERPL-MCNC: 222 MG/DL (ref 65–100)
HBA1C MFR BLD: 7.2 % (ref 4–5.6)
HCT VFR BLD AUTO: 23 % (ref 36.6–50.3)
HGB BLD-MCNC: 7.7 G/DL (ref 12.1–17)
IMM GRANULOCYTES # BLD AUTO: 0 K/UL (ref 0–0.04)
IMM GRANULOCYTES NFR BLD AUTO: 0 % (ref 0–0.5)
LYMPHOCYTES # BLD: 1.2 K/UL (ref 0.8–3.5)
LYMPHOCYTES NFR BLD: 12 % (ref 12–49)
MCH RBC QN AUTO: 27.8 PG (ref 26–34)
MCHC RBC AUTO-ENTMCNC: 33.5 G/DL (ref 30–36.5)
MCV RBC AUTO: 83 FL (ref 80–99)
MONOCYTES # BLD: 0.9 K/UL (ref 0–1)
MONOCYTES NFR BLD: 9 % (ref 5–13)
NEUTS SEG # BLD: 8 K/UL (ref 1.8–8)
NEUTS SEG NFR BLD: 78 % (ref 32–75)
NRBC # BLD: 0 K/UL (ref 0–0.01)
NRBC BLD-RTO: 0 PER 100 WBC
PLATELET # BLD AUTO: 173 K/UL (ref 150–400)
PMV BLD AUTO: 12.6 FL (ref 8.9–12.9)
POTASSIUM SERPL-SCNC: 5.1 MMOL/L (ref 3.5–5.1)
RBC # BLD AUTO: 2.77 M/UL (ref 4.1–5.7)
SERVICE CMNT-IMP: ABNORMAL
SERVICE CMNT-IMP: NORMAL
SODIUM SERPL-SCNC: 131 MMOL/L (ref 136–145)
WBC # BLD AUTO: 10.2 K/UL (ref 4.1–11.1)

## 2024-03-16 PROCEDURE — 2580000003 HC RX 258: Performed by: HOSPITALIST

## 2024-03-16 PROCEDURE — 82962 GLUCOSE BLOOD TEST: CPT

## 2024-03-16 PROCEDURE — 6370000000 HC RX 637 (ALT 250 FOR IP): Performed by: HOSPITALIST

## 2024-03-16 PROCEDURE — 83036 HEMOGLOBIN GLYCOSYLATED A1C: CPT

## 2024-03-16 PROCEDURE — 80048 BASIC METABOLIC PNL TOTAL CA: CPT

## 2024-03-16 PROCEDURE — 94761 N-INVAS EAR/PLS OXIMETRY MLT: CPT

## 2024-03-16 PROCEDURE — 6360000002 HC RX W HCPCS: Performed by: HOSPITALIST

## 2024-03-16 PROCEDURE — 71250 CT THORAX DX C-: CPT

## 2024-03-16 PROCEDURE — 6370000000 HC RX 637 (ALT 250 FOR IP)

## 2024-03-16 PROCEDURE — 1100000000 HC RM PRIVATE

## 2024-03-16 PROCEDURE — 94640 AIRWAY INHALATION TREATMENT: CPT

## 2024-03-16 PROCEDURE — 85025 COMPLETE CBC W/AUTO DIFF WBC: CPT

## 2024-03-16 PROCEDURE — 36415 COLL VENOUS BLD VENIPUNCTURE: CPT

## 2024-03-16 RX ORDER — SODIUM CHLORIDE 9 MG/ML
INJECTION, SOLUTION INTRAVENOUS CONTINUOUS
Status: DISCONTINUED | OUTPATIENT
Start: 2024-03-16 | End: 2024-03-19

## 2024-03-16 RX ORDER — IPRATROPIUM BROMIDE AND ALBUTEROL SULFATE 2.5; .5 MG/3ML; MG/3ML
1 SOLUTION RESPIRATORY (INHALATION)
Status: DISCONTINUED | OUTPATIENT
Start: 2024-03-16 | End: 2024-03-21 | Stop reason: HOSPADM

## 2024-03-16 RX ORDER — INSULIN LISPRO 100 [IU]/ML
INJECTION, SOLUTION INTRAVENOUS; SUBCUTANEOUS
Status: COMPLETED
Start: 2024-03-16 | End: 2024-03-16

## 2024-03-16 RX ORDER — INSULIN LISPRO 100 [IU]/ML
4 INJECTION, SOLUTION INTRAVENOUS; SUBCUTANEOUS ONCE
Status: COMPLETED | OUTPATIENT
Start: 2024-03-16 | End: 2024-03-16

## 2024-03-16 RX ORDER — INSULIN LISPRO 100 [IU]/ML
0-8 INJECTION, SOLUTION INTRAVENOUS; SUBCUTANEOUS
Status: DISCONTINUED | OUTPATIENT
Start: 2024-03-16 | End: 2024-03-21 | Stop reason: HOSPADM

## 2024-03-16 RX ORDER — INSULIN LISPRO 100 [IU]/ML
0-4 INJECTION, SOLUTION INTRAVENOUS; SUBCUTANEOUS NIGHTLY
Status: DISCONTINUED | OUTPATIENT
Start: 2024-03-16 | End: 2024-03-21 | Stop reason: HOSPADM

## 2024-03-16 RX ADMIN — SODIUM CHLORIDE, PRESERVATIVE FREE 10 ML: 5 INJECTION INTRAVENOUS at 13:50

## 2024-03-16 RX ADMIN — PIPERACILLIN AND TAZOBACTAM 3375 MG: 3; .375 INJECTION, POWDER, FOR SOLUTION INTRAVENOUS at 13:50

## 2024-03-16 RX ADMIN — IPRATROPIUM BROMIDE AND ALBUTEROL SULFATE 1 DOSE: .5; 3 SOLUTION RESPIRATORY (INHALATION) at 14:41

## 2024-03-16 RX ADMIN — MIDODRINE HYDROCHLORIDE 2.5 MG: 5 TABLET ORAL at 21:59

## 2024-03-16 RX ADMIN — DANTROLENE SODIUM 25 MG: 25 CAPSULE ORAL at 21:58

## 2024-03-16 RX ADMIN — LEVOTHYROXINE SODIUM 75 MCG: 0.07 TABLET ORAL at 06:40

## 2024-03-16 RX ADMIN — PIPERACILLIN AND TAZOBACTAM 3375 MG: 3; .375 INJECTION, POWDER, FOR SOLUTION INTRAVENOUS at 22:02

## 2024-03-16 RX ADMIN — DILTIAZEM HYDROCHLORIDE 60 MG: 30 TABLET ORAL at 21:56

## 2024-03-16 RX ADMIN — ATORVASTATIN CALCIUM 40 MG: 20 TABLET, FILM COATED ORAL at 21:56

## 2024-03-16 RX ADMIN — ENOXAPARIN SODIUM 40 MG: 100 INJECTION SUBCUTANEOUS at 08:45

## 2024-03-16 RX ADMIN — INSULIN LISPRO 8 UNITS: 100 INJECTION, SOLUTION INTRAVENOUS; SUBCUTANEOUS at 11:42

## 2024-03-16 RX ADMIN — INSULIN LISPRO 4 UNITS: 100 INJECTION, SOLUTION INTRAVENOUS; SUBCUTANEOUS at 15:10

## 2024-03-16 RX ADMIN — FAMOTIDINE 20 MG: 20 TABLET, FILM COATED ORAL at 08:43

## 2024-03-16 RX ADMIN — ARFORMOTEROL TARTRATE 15 MCG: 15 SOLUTION RESPIRATORY (INHALATION) at 09:05

## 2024-03-16 RX ADMIN — ACETAMINOPHEN 650 MG: 325 TABLET ORAL at 02:53

## 2024-03-16 RX ADMIN — TRAZODONE HYDROCHLORIDE 150 MG: 100 TABLET ORAL at 21:58

## 2024-03-16 RX ADMIN — INSULIN GLARGINE 14 UNITS: 100 INJECTION, SOLUTION SUBCUTANEOUS at 08:45

## 2024-03-16 RX ADMIN — DILTIAZEM HYDROCHLORIDE 60 MG: 30 TABLET ORAL at 08:43

## 2024-03-16 RX ADMIN — IPRATROPIUM BROMIDE AND ALBUTEROL SULFATE 1 DOSE: .5; 3 SOLUTION RESPIRATORY (INHALATION) at 09:00

## 2024-03-16 RX ADMIN — CARVEDILOL 12.5 MG: 12.5 TABLET, FILM COATED ORAL at 21:56

## 2024-03-16 RX ADMIN — DANTROLENE SODIUM 25 MG: 25 CAPSULE ORAL at 08:46

## 2024-03-16 RX ADMIN — DIAZEPAM 2.5 MG: 10 INJECTION, SOLUTION INTRAMUSCULAR; INTRAVENOUS at 06:04

## 2024-03-16 RX ADMIN — PIPERACILLIN AND TAZOBACTAM 3375 MG: 3; .375 INJECTION, POWDER, FOR SOLUTION INTRAVENOUS at 06:39

## 2024-03-16 RX ADMIN — BUDESONIDE 500 MCG: 0.5 INHALANT RESPIRATORY (INHALATION) at 09:05

## 2024-03-16 RX ADMIN — MIDODRINE HYDROCHLORIDE 2.5 MG: 5 TABLET ORAL at 08:44

## 2024-03-16 RX ADMIN — SODIUM CHLORIDE, PRESERVATIVE FREE 10 ML: 5 INJECTION INTRAVENOUS at 01:07

## 2024-03-16 RX ADMIN — CARVEDILOL 12.5 MG: 12.5 TABLET, FILM COATED ORAL at 08:44

## 2024-03-16 RX ADMIN — INSULIN LISPRO 6 UNITS: 100 INJECTION, SOLUTION INTRAVENOUS; SUBCUTANEOUS at 17:03

## 2024-03-16 RX ADMIN — DEXTROSE AND SODIUM CHLORIDE: 5; 900 INJECTION, SOLUTION INTRAVENOUS at 15:10

## 2024-03-16 RX ADMIN — ARFORMOTEROL TARTRATE 15 MCG: 15 SOLUTION RESPIRATORY (INHALATION) at 20:13

## 2024-03-16 RX ADMIN — IPRATROPIUM BROMIDE AND ALBUTEROL SULFATE 1 DOSE: .5; 3 SOLUTION RESPIRATORY (INHALATION) at 20:09

## 2024-03-16 RX ADMIN — SERTRALINE HYDROCHLORIDE 200 MG: 50 TABLET ORAL at 08:42

## 2024-03-16 RX ADMIN — ASPIRIN 81 MG: 81 TABLET, CHEWABLE ORAL at 08:44

## 2024-03-16 RX ADMIN — FOLIC ACID 1 MG: 1 TABLET ORAL at 08:43

## 2024-03-16 RX ADMIN — SODIUM CHLORIDE: 9 INJECTION, SOLUTION INTRAVENOUS at 17:03

## 2024-03-16 RX ADMIN — BUDESONIDE 500 MCG: 0.5 INHALANT RESPIRATORY (INHALATION) at 20:13

## 2024-03-16 RX ADMIN — DEXTROSE AND SODIUM CHLORIDE: 5; 900 INJECTION, SOLUTION INTRAVENOUS at 00:12

## 2024-03-16 ASSESSMENT — PAIN DESCRIPTION - LOCATION
LOCATION: LEG
LOCATION: LEG

## 2024-03-16 ASSESSMENT — PAIN SCALES - GENERAL
PAINLEVEL_OUTOF10: 5
PAINLEVEL_OUTOF10: 7

## 2024-03-16 NOTE — H&P
Auto Differential    Collection Time: 03/15/24  6:50 PM   Result Value Ref Range    WBC 11.0 4.1 - 11.1 K/uL    RBC 3.29 (L) 4.10 - 5.70 M/uL    Hemoglobin 9.0 (L) 12.1 - 17.0 g/dL    Hematocrit 27.8 (L) 36.6 - 50.3 %    MCV 84.5 80.0 - 99.0 FL    MCH 27.4 26.0 - 34.0 PG    MCHC 32.4 30.0 - 36.5 g/dL    RDW 16.4 (H) 11.5 - 14.5 %    Platelets 170 150 - 400 K/uL    MPV 12.4 8.9 - 12.9 FL    Nucleated RBCs 0.0 0  WBC    nRBC 0.00 0.00 - 0.01 K/uL    Neutrophils % 79 (H) 32 - 75 %    Lymphocytes % 13 12 - 49 %    Monocytes % 8 5 - 13 %    Eosinophils % 0 0 - 7 %    Basophils % 0 0 - 1 %    Immature Granulocytes 0 0.0 - 0.5 %    Neutrophils Absolute 8.6 (H) 1.8 - 8.0 K/UL    Lymphocytes Absolute 1.5 0.8 - 3.5 K/UL    Monocytes Absolute 0.9 0.0 - 1.0 K/UL    Eosinophils Absolute 0.0 0.0 - 0.4 K/UL    Basophils Absolute 0.0 0.0 - 0.1 K/UL    Absolute Immature Granulocyte 0.0 0.00 - 0.04 K/UL    Differential Type AUTOMATED     Basic Metabolic Panel    Collection Time: 03/15/24  6:50 PM   Result Value Ref Range    Sodium 134 (L) 136 - 145 mmol/L    Potassium 5.8 (H) 3.5 - 5.1 mmol/L    Chloride 100 97 - 108 mmol/L    CO2 26 21 - 32 mmol/L    Anion Gap 8 5 - 15 mmol/L    Glucose 172 (H) 65 - 100 mg/dL    BUN 41 (H) 6 - 20 MG/DL    Creatinine 1.72 (H) 0.70 - 1.30 MG/DL    Bun/Cre Ratio 24 (H) 12 - 20      Est, Glom Filt Rate 43 (L) >60 ml/min/1.73m2    Calcium 9.7 8.5 - 10.1 MG/DL   Extra Tubes Hold    Collection Time: 03/15/24  6:50 PM   Result Value Ref Range    Specimen HOld  1SST, 1BLU     Comment:        Add-on orders for these samples will be processed based on acceptable specimen integrity and analyte stability, which may vary by analyte.   Procalcitonin    Collection Time: 03/15/24  6:50 PM   Result Value Ref Range    Procalcitonin 0.29 ng/mL   Troponin    Collection Time: 03/15/24  6:50 PM   Result Value Ref Range    Troponin, High Sensitivity 36 0 - 76 ng/L   COVID-19 & Influenza Combo    Collection Time:

## 2024-03-16 NOTE — ED NOTES
.Bedside and Verbal shift change report given to Katherine (oncoming nurse) by Sherrie (offgoing nurse). Report included the following information Nurse Handoff Report, ED SBAR, Adult Overview, MAR, Recent Results, and Neuro Assessment.

## 2024-03-16 NOTE — CONSULTS
Brief Nutrition Assessment    Type and Reason for Visit: Brief Assessment    Nutrition Recommendations/Plan:   Start TF:    Monitor blood sugars carefully       Addendum 109pm: Messaged from nursing that patient's wife refusing above feeds, stating volume too much for patient. Asked to modify order as below. This order provides 900 mL formula; 1080 kcal (51% needs); 141 g carbs, 49 g protein (55% needs). TF + FWF provides 1811 mL H2O/day. RD messaged nursing that this volume is inadequate for caloric needs; will assess tolerance x 24 hours and increase as able. If still admitted on Monday, RD to discuss with patient/spouse about increasing volumes.     Gravity feeds Osmolite 1.2, 180 mL 5x/day (0830, 1130, 1430, 1730, 2030)    mL before and after each bolus    Nutrition Assessment:  Chart reviewed by weekend on-call dietitian. RD ordered tube feed from previous RD notes - assess for tolerance. Full assessment to follow within 1 - 3 days per RD policy.     TF at goal volume provides 1775 mL; 2130 kcal (100% needs); 279 g carbs, 98 g protein (100% needs). TF + FWF provides 1955 mL H2O/day.    Diet Order:  Diet NPO Exceptions are: Sips of Water with Meds    Estimated Daily Nutrient Needs:   Energy (kcal): 2100 (30 kcal/kg)         Wt used: Current (71.2kg)  Protein (gm):  (1.2-1.5 gm/kg)               Fluid (mL/day): 1 mL/kcal     Nutr. Labs:  Hemoglobin A1C   Date Value Ref Range Status   03/16/2024 7.2 (H) 4.0 - 5.6 % Final     Comment:     (NOTE)  HbA1C Interpretive Ranges  <5.7              Normal  5.7 - 6.4         Consider Prediabetes  >6.5              Consider Diabetes         Lab Results   Component Value Date/Time    POCGLU 387 03/16/2024 11:14 AM    POCGLU 241 03/16/2024 06:11 AM    POCGLU 180 03/15/2024 11:57 PM    POCGLU 74 03/15/2024 10:59 PM    POCGLU 181 03/15/2024 06:48 PM    POCGLU 392 05/29/2022 06:55 AM           Electronically signed by Karla Agudelo RD MS  Contact: [phone] or via

## 2024-03-16 NOTE — ED NOTES
Patient given meds via PEG tube. Flushed with 30cc water. Wife gave patient normal tube feeding by gravity.

## 2024-03-16 NOTE — ED NOTES
TRANSFER - OUT REPORT:    Verbal report given to AUSTIN Munoz on [place] Fink  being transferred to [place] [id number] for routine progression of patient care       Report consisted of patient's Situation, Background, Assessment and   Recommendations(SBAR).     Information from the following report(s) ED Encounter Summary, ED SBAR, Intake/Output, MAR, and Recent Results was reviewed with the receiving nurse.    [place] Fall Assessment:    Presents to emergency department  because of falls (Syncope, seizure, or loss of consciousness): No  Age > 70: No  Altered Mental Status, Intoxication with alcohol or substance confusion (Disorientation, impaired judgment, poor safety awaremess, or inability to follow instructions): Yes  Impaired Mobility: Ambulates or transfers with assistive devices or assistance; Unable to ambulate or transer.: Yes  Nursing Judgement: Yes          Lines:   Peripheral IV 03/15/24 Right Antecubital (Active)   Site Assessment Clean, dry & intact 03/16/24 0105   Line Status Infusing;Blood return noted;Flushed 03/16/24 0105   Line Care Connections checked and tightened 03/16/24 0105   Phlebitis Assessment No symptoms 03/16/24 0105   Infiltration Assessment 0 03/16/24 0105   Alcohol Cap Used No 03/16/24 0105   Dressing Status Clean, dry & intact 03/16/24 0105   Dressing Type Transparent 03/16/24 0105        Opportunity for questions and clarification was provided.      Patient transported with:  Tech, Monitor

## 2024-03-17 LAB
ANION GAP SERPL CALC-SCNC: 7 MMOL/L (ref 5–15)
BASOPHILS # BLD: 0 K/UL (ref 0–0.1)
BASOPHILS NFR BLD: 0 % (ref 0–1)
BUN SERPL-MCNC: 37 MG/DL (ref 6–20)
BUN/CREAT SERPL: 28 (ref 12–20)
CALCIUM SERPL-MCNC: 8.8 MG/DL (ref 8.5–10.1)
CHLORIDE SERPL-SCNC: 103 MMOL/L (ref 97–108)
CO2 SERPL-SCNC: 24 MMOL/L (ref 21–32)
CREAT SERPL-MCNC: 1.3 MG/DL (ref 0.7–1.3)
DIFFERENTIAL METHOD BLD: ABNORMAL
EOSINOPHIL # BLD: 0 K/UL (ref 0–0.4)
EOSINOPHIL NFR BLD: 0 % (ref 0–7)
ERYTHROCYTE [DISTWIDTH] IN BLOOD BY AUTOMATED COUNT: 16.4 % (ref 11.5–14.5)
GLUCOSE BLD STRIP.AUTO-MCNC: 143 MG/DL (ref 65–117)
GLUCOSE BLD STRIP.AUTO-MCNC: 167 MG/DL (ref 65–117)
GLUCOSE BLD STRIP.AUTO-MCNC: 232 MG/DL (ref 65–117)
GLUCOSE BLD STRIP.AUTO-MCNC: 270 MG/DL (ref 65–117)
GLUCOSE BLD STRIP.AUTO-MCNC: 353 MG/DL (ref 65–117)
GLUCOSE SERPL-MCNC: 132 MG/DL (ref 65–100)
HCT VFR BLD AUTO: 22.7 % (ref 36.6–50.3)
HGB BLD-MCNC: 7.6 G/DL (ref 12.1–17)
IMM GRANULOCYTES # BLD AUTO: 0 K/UL (ref 0–0.04)
IMM GRANULOCYTES NFR BLD AUTO: 0 % (ref 0–0.5)
LYMPHOCYTES # BLD: 0.9 K/UL (ref 0.8–3.5)
LYMPHOCYTES NFR BLD: 10 % (ref 12–49)
MCH RBC QN AUTO: 27.5 PG (ref 26–34)
MCHC RBC AUTO-ENTMCNC: 33.5 G/DL (ref 30–36.5)
MCV RBC AUTO: 82.2 FL (ref 80–99)
MONOCYTES # BLD: 0.7 K/UL (ref 0–1)
MONOCYTES NFR BLD: 7 % (ref 5–13)
NEUTS SEG # BLD: 7.5 K/UL (ref 1.8–8)
NEUTS SEG NFR BLD: 83 % (ref 32–75)
NRBC # BLD: 0 K/UL (ref 0–0.01)
NRBC BLD-RTO: 0 PER 100 WBC
PLATELET # BLD AUTO: 187 K/UL (ref 150–400)
PMV BLD AUTO: 12.7 FL (ref 8.9–12.9)
POTASSIUM SERPL-SCNC: 4.4 MMOL/L (ref 3.5–5.1)
RBC # BLD AUTO: 2.76 M/UL (ref 4.1–5.7)
SERVICE CMNT-IMP: ABNORMAL
SODIUM SERPL-SCNC: 134 MMOL/L (ref 136–145)
WBC # BLD AUTO: 9.1 K/UL (ref 4.1–11.1)

## 2024-03-17 PROCEDURE — 2580000003 HC RX 258: Performed by: HOSPITALIST

## 2024-03-17 PROCEDURE — 6360000002 HC RX W HCPCS: Performed by: HOSPITALIST

## 2024-03-17 PROCEDURE — 94640 AIRWAY INHALATION TREATMENT: CPT

## 2024-03-17 PROCEDURE — 82962 GLUCOSE BLOOD TEST: CPT

## 2024-03-17 PROCEDURE — 94761 N-INVAS EAR/PLS OXIMETRY MLT: CPT

## 2024-03-17 PROCEDURE — 6370000000 HC RX 637 (ALT 250 FOR IP): Performed by: HOSPITALIST

## 2024-03-17 PROCEDURE — 85025 COMPLETE CBC W/AUTO DIFF WBC: CPT

## 2024-03-17 PROCEDURE — 36415 COLL VENOUS BLD VENIPUNCTURE: CPT

## 2024-03-17 PROCEDURE — 1100000000 HC RM PRIVATE

## 2024-03-17 PROCEDURE — 80048 BASIC METABOLIC PNL TOTAL CA: CPT

## 2024-03-17 RX ADMIN — ARFORMOTEROL TARTRATE 15 MCG: 15 SOLUTION RESPIRATORY (INHALATION) at 19:51

## 2024-03-17 RX ADMIN — TRAZODONE HYDROCHLORIDE 150 MG: 100 TABLET ORAL at 21:01

## 2024-03-17 RX ADMIN — BUDESONIDE 500 MCG: 0.5 INHALANT RESPIRATORY (INHALATION) at 19:51

## 2024-03-17 RX ADMIN — DILTIAZEM HYDROCHLORIDE 60 MG: 30 TABLET ORAL at 21:05

## 2024-03-17 RX ADMIN — MIDODRINE HYDROCHLORIDE 2.5 MG: 5 TABLET ORAL at 08:36

## 2024-03-17 RX ADMIN — MIDODRINE HYDROCHLORIDE 2.5 MG: 5 TABLET ORAL at 21:00

## 2024-03-17 RX ADMIN — ASPIRIN 81 MG: 81 TABLET, CHEWABLE ORAL at 08:37

## 2024-03-17 RX ADMIN — INSULIN LISPRO 2 UNITS: 100 INJECTION, SOLUTION INTRAVENOUS; SUBCUTANEOUS at 11:36

## 2024-03-17 RX ADMIN — BUDESONIDE 500 MCG: 0.5 INHALANT RESPIRATORY (INHALATION) at 07:12

## 2024-03-17 RX ADMIN — FOLIC ACID 1 MG: 1 TABLET ORAL at 08:37

## 2024-03-17 RX ADMIN — IPRATROPIUM BROMIDE AND ALBUTEROL SULFATE 1 DOSE: .5; 3 SOLUTION RESPIRATORY (INHALATION) at 19:43

## 2024-03-17 RX ADMIN — CARVEDILOL 12.5 MG: 12.5 TABLET, FILM COATED ORAL at 08:36

## 2024-03-17 RX ADMIN — ENOXAPARIN SODIUM 40 MG: 100 INJECTION SUBCUTANEOUS at 08:38

## 2024-03-17 RX ADMIN — INSULIN LISPRO 8 UNITS: 100 INJECTION, SOLUTION INTRAVENOUS; SUBCUTANEOUS at 16:52

## 2024-03-17 RX ADMIN — SODIUM CHLORIDE: 9 INJECTION, SOLUTION INTRAVENOUS at 06:01

## 2024-03-17 RX ADMIN — LEVOTHYROXINE SODIUM 75 MCG: 0.07 TABLET ORAL at 05:48

## 2024-03-17 RX ADMIN — ARFORMOTEROL TARTRATE 15 MCG: 15 SOLUTION RESPIRATORY (INHALATION) at 07:12

## 2024-03-17 RX ADMIN — SODIUM CHLORIDE: 9 INJECTION, SOLUTION INTRAVENOUS at 20:58

## 2024-03-17 RX ADMIN — CARVEDILOL 12.5 MG: 12.5 TABLET, FILM COATED ORAL at 20:59

## 2024-03-17 RX ADMIN — SERTRALINE HYDROCHLORIDE 200 MG: 50 TABLET ORAL at 08:37

## 2024-03-17 RX ADMIN — ATORVASTATIN CALCIUM 40 MG: 20 TABLET, FILM COATED ORAL at 20:58

## 2024-03-17 RX ADMIN — DANTROLENE SODIUM 25 MG: 25 CAPSULE ORAL at 15:02

## 2024-03-17 RX ADMIN — ACETAMINOPHEN 650 MG: 325 TABLET ORAL at 23:37

## 2024-03-17 RX ADMIN — PIPERACILLIN AND TAZOBACTAM 3375 MG: 3; .375 INJECTION, POWDER, FOR SOLUTION INTRAVENOUS at 21:22

## 2024-03-17 RX ADMIN — DILTIAZEM HYDROCHLORIDE 60 MG: 30 TABLET ORAL at 08:37

## 2024-03-17 RX ADMIN — IPRATROPIUM BROMIDE AND ALBUTEROL SULFATE 1 DOSE: .5; 3 SOLUTION RESPIRATORY (INHALATION) at 07:05

## 2024-03-17 RX ADMIN — IPRATROPIUM BROMIDE AND ALBUTEROL SULFATE 1 DOSE: .5; 3 SOLUTION RESPIRATORY (INHALATION) at 13:31

## 2024-03-17 RX ADMIN — ACETAMINOPHEN 650 MG: 325 TABLET ORAL at 05:48

## 2024-03-17 RX ADMIN — SODIUM CHLORIDE, PRESERVATIVE FREE 10 ML: 5 INJECTION INTRAVENOUS at 15:00

## 2024-03-17 RX ADMIN — INSULIN GLARGINE 14 UNITS: 100 INJECTION, SOLUTION SUBCUTANEOUS at 08:38

## 2024-03-17 RX ADMIN — PIPERACILLIN AND TAZOBACTAM 3375 MG: 3; .375 INJECTION, POWDER, FOR SOLUTION INTRAVENOUS at 05:49

## 2024-03-17 RX ADMIN — SODIUM CHLORIDE, PRESERVATIVE FREE 10 ML: 5 INJECTION INTRAVENOUS at 20:56

## 2024-03-17 RX ADMIN — FAMOTIDINE 20 MG: 20 TABLET, FILM COATED ORAL at 08:37

## 2024-03-17 RX ADMIN — DANTROLENE SODIUM 25 MG: 25 CAPSULE ORAL at 11:36

## 2024-03-17 RX ADMIN — DANTROLENE SODIUM 25 MG: 25 CAPSULE ORAL at 20:59

## 2024-03-17 RX ADMIN — PIPERACILLIN AND TAZOBACTAM 3375 MG: 3; .375 INJECTION, POWDER, FOR SOLUTION INTRAVENOUS at 14:59

## 2024-03-17 ASSESSMENT — PAIN DESCRIPTION - LOCATION: LOCATION: GENERALIZED

## 2024-03-17 ASSESSMENT — PAIN SCALES - GENERAL: PAINLEVEL_OUTOF10: 3

## 2024-03-17 ASSESSMENT — PAIN DESCRIPTION - DESCRIPTORS: DESCRIPTORS: ACHING

## 2024-03-17 NOTE — PLAN OF CARE
Problem: Safety - Adult  Goal: Free from fall injury  3/17/2024 1028 by Tammy Drummond RN  Outcome: Progressing  3/17/2024 1028 by Tammy Drummond RN  Outcome: Progressing  3/17/2024 0645 by Estrellita Vasquez RN  Outcome: Progressing     Problem: Pain  Goal: Verbalizes/displays adequate comfort level or baseline comfort level  3/17/2024 1028 by Tammy Drummond RN  Outcome: Progressing  3/17/2024 1028 by Tammy Drummond RN  Outcome: Progressing  3/17/2024 0645 by Estrellita Vasquez RN  Outcome: Progressing     Problem: Skin/Tissue Integrity  Goal: Absence of new skin breakdown  Description: 1.  Monitor for areas of redness and/or skin breakdown  2.  Assess vascular access sites hourly  3.  Every 4-6 hours minimum:  Change oxygen saturation probe site  4.  Every 4-6 hours:  If on nasal continuous positive airway pressure, respiratory therapy assess nares and determine need for appliance change or resting period.  3/17/2024 1028 by Tammy Drummond RN  Outcome: Progressing  3/17/2024 1028 by Tammy Drummond RN  Outcome: Progressing  3/17/2024 0645 by Estrellita Vasquez RN  Outcome: Progressing     Problem: Respiratory - Adult  Goal: Achieves optimal ventilation and oxygenation  3/17/2024 1028 by Tammy Drummond RN  Outcome: Progressing  3/17/2024 1028 by Tammy Drummond RN  Outcome: Progressing  3/17/2024 0929 by Alejandra Urbina, RT  Outcome: Progressing

## 2024-03-18 LAB
ANION GAP SERPL CALC-SCNC: 6 MMOL/L (ref 5–15)
BACTERIA SPEC CULT: ABNORMAL
BASOPHILS # BLD: 0 K/UL (ref 0–0.1)
BASOPHILS NFR BLD: 0 % (ref 0–1)
BUN SERPL-MCNC: 36 MG/DL (ref 6–20)
BUN/CREAT SERPL: 29 (ref 12–20)
CALCIUM SERPL-MCNC: 8.7 MG/DL (ref 8.5–10.1)
CC UR VC: ABNORMAL
CHLORIDE SERPL-SCNC: 106 MMOL/L (ref 97–108)
CO2 SERPL-SCNC: 22 MMOL/L (ref 21–32)
CREAT SERPL-MCNC: 1.26 MG/DL (ref 0.7–1.3)
DIFFERENTIAL METHOD BLD: ABNORMAL
EKG ATRIAL RATE: 312 BPM
EKG DIAGNOSIS: NORMAL
EKG P AXIS: 55 DEGREES
EKG Q-T INTERVAL: 378 MS
EKG QRS DURATION: 76 MS
EKG QTC CALCULATION (BAZETT): 430 MS
EKG R AXIS: 85 DEGREES
EKG T AXIS: 123 DEGREES
EKG VENTRICULAR RATE: 78 BPM
EOSINOPHIL # BLD: 0 K/UL (ref 0–0.4)
EOSINOPHIL NFR BLD: 0 % (ref 0–7)
ERYTHROCYTE [DISTWIDTH] IN BLOOD BY AUTOMATED COUNT: 16.3 % (ref 11.5–14.5)
GLUCOSE BLD STRIP.AUTO-MCNC: 127 MG/DL (ref 65–117)
GLUCOSE BLD STRIP.AUTO-MCNC: 230 MG/DL (ref 65–117)
GLUCOSE BLD STRIP.AUTO-MCNC: 258 MG/DL (ref 65–117)
GLUCOSE BLD STRIP.AUTO-MCNC: 270 MG/DL (ref 65–117)
GLUCOSE SERPL-MCNC: 273 MG/DL (ref 65–100)
HCT VFR BLD AUTO: 23.1 % (ref 36.6–50.3)
HGB BLD-MCNC: 7.6 G/DL (ref 12.1–17)
IMM GRANULOCYTES # BLD AUTO: 0 K/UL (ref 0–0.04)
IMM GRANULOCYTES NFR BLD AUTO: 1 % (ref 0–0.5)
IRON SATN MFR SERPL: 5 % (ref 20–50)
IRON SERPL-MCNC: 8 UG/DL (ref 35–150)
LYMPHOCYTES # BLD: 1 K/UL (ref 0.8–3.5)
LYMPHOCYTES NFR BLD: 12 % (ref 12–49)
MCH RBC QN AUTO: 27 PG (ref 26–34)
MCHC RBC AUTO-ENTMCNC: 32.9 G/DL (ref 30–36.5)
MCV RBC AUTO: 82.2 FL (ref 80–99)
MONOCYTES # BLD: 0.7 K/UL (ref 0–1)
MONOCYTES NFR BLD: 8 % (ref 5–13)
NEUTS SEG # BLD: 6.5 K/UL (ref 1.8–8)
NEUTS SEG NFR BLD: 79 % (ref 32–75)
NRBC # BLD: 0 K/UL (ref 0–0.01)
NRBC BLD-RTO: 0 PER 100 WBC
PLATELET # BLD AUTO: 176 K/UL (ref 150–400)
PMV BLD AUTO: 12.2 FL (ref 8.9–12.9)
POTASSIUM SERPL-SCNC: 4.1 MMOL/L (ref 3.5–5.1)
RBC # BLD AUTO: 2.81 M/UL (ref 4.1–5.7)
SERVICE CMNT-IMP: ABNORMAL
SODIUM SERPL-SCNC: 134 MMOL/L (ref 136–145)
TIBC SERPL-MCNC: 167 UG/DL (ref 250–450)
VIT B12 SERPL-MCNC: 1255 PG/ML (ref 193–986)
WBC # BLD AUTO: 8.3 K/UL (ref 4.1–11.1)

## 2024-03-18 PROCEDURE — 93010 ELECTROCARDIOGRAM REPORT: CPT | Performed by: SPECIALIST

## 2024-03-18 PROCEDURE — 36415 COLL VENOUS BLD VENIPUNCTURE: CPT

## 2024-03-18 PROCEDURE — 82607 VITAMIN B-12: CPT

## 2024-03-18 PROCEDURE — 6370000000 HC RX 637 (ALT 250 FOR IP): Performed by: HOSPITALIST

## 2024-03-18 PROCEDURE — 82746 ASSAY OF FOLIC ACID SERUM: CPT

## 2024-03-18 PROCEDURE — 2580000003 HC RX 258: Performed by: FAMILY MEDICINE

## 2024-03-18 PROCEDURE — 6360000002 HC RX W HCPCS: Performed by: HOSPITALIST

## 2024-03-18 PROCEDURE — 6370000000 HC RX 637 (ALT 250 FOR IP): Performed by: FAMILY MEDICINE

## 2024-03-18 PROCEDURE — 83540 ASSAY OF IRON: CPT

## 2024-03-18 PROCEDURE — 2580000003 HC RX 258: Performed by: HOSPITALIST

## 2024-03-18 PROCEDURE — 80048 BASIC METABOLIC PNL TOTAL CA: CPT

## 2024-03-18 PROCEDURE — 85025 COMPLETE CBC W/AUTO DIFF WBC: CPT

## 2024-03-18 PROCEDURE — 94761 N-INVAS EAR/PLS OXIMETRY MLT: CPT

## 2024-03-18 PROCEDURE — 94640 AIRWAY INHALATION TREATMENT: CPT

## 2024-03-18 PROCEDURE — 82962 GLUCOSE BLOOD TEST: CPT

## 2024-03-18 PROCEDURE — 1100000000 HC RM PRIVATE

## 2024-03-18 PROCEDURE — 6360000002 HC RX W HCPCS: Performed by: FAMILY MEDICINE

## 2024-03-18 PROCEDURE — 83550 IRON BINDING TEST: CPT

## 2024-03-18 RX ORDER — TIZANIDINE 4 MG/1
4 TABLET ORAL EVERY 6 HOURS PRN
COMMUNITY

## 2024-03-18 RX ORDER — LACTOBACILLUS RHAMNOSUS GG 10B CELL
1 CAPSULE ORAL
Status: DISCONTINUED | OUTPATIENT
Start: 2024-03-19 | End: 2024-03-21 | Stop reason: HOSPADM

## 2024-03-18 RX ORDER — TIZANIDINE 2 MG/1
4 TABLET ORAL EVERY 6 HOURS PRN
Status: DISCONTINUED | OUTPATIENT
Start: 2024-03-18 | End: 2024-03-21 | Stop reason: HOSPADM

## 2024-03-18 RX ADMIN — TIZANIDINE 4 MG: 2 TABLET ORAL at 17:32

## 2024-03-18 RX ADMIN — IPRATROPIUM BROMIDE AND ALBUTEROL SULFATE 1 DOSE: .5; 3 SOLUTION RESPIRATORY (INHALATION) at 19:34

## 2024-03-18 RX ADMIN — INSULIN GLARGINE 14 UNITS: 100 INJECTION, SOLUTION SUBCUTANEOUS at 08:43

## 2024-03-18 RX ADMIN — CARVEDILOL 12.5 MG: 12.5 TABLET, FILM COATED ORAL at 08:44

## 2024-03-18 RX ADMIN — PIPERACILLIN AND TAZOBACTAM 3375 MG: 3; .375 INJECTION, POWDER, FOR SOLUTION INTRAVENOUS at 17:28

## 2024-03-18 RX ADMIN — TRAZODONE HYDROCHLORIDE 150 MG: 100 TABLET ORAL at 20:36

## 2024-03-18 RX ADMIN — BUDESONIDE 500 MCG: 0.5 INHALANT RESPIRATORY (INHALATION) at 19:41

## 2024-03-18 RX ADMIN — ARFORMOTEROL TARTRATE 15 MCG: 15 SOLUTION RESPIRATORY (INHALATION) at 19:41

## 2024-03-18 RX ADMIN — DANTROLENE SODIUM 25 MG: 25 CAPSULE ORAL at 08:44

## 2024-03-18 RX ADMIN — INSULIN LISPRO 4 UNITS: 100 INJECTION, SOLUTION INTRAVENOUS; SUBCUTANEOUS at 08:43

## 2024-03-18 RX ADMIN — FOLIC ACID 1 MG: 1 TABLET ORAL at 08:44

## 2024-03-18 RX ADMIN — ENOXAPARIN SODIUM 40 MG: 100 INJECTION SUBCUTANEOUS at 08:45

## 2024-03-18 RX ADMIN — INSULIN LISPRO 2 UNITS: 100 INJECTION, SOLUTION INTRAVENOUS; SUBCUTANEOUS at 17:32

## 2024-03-18 RX ADMIN — ASPIRIN 81 MG: 81 TABLET, CHEWABLE ORAL at 08:44

## 2024-03-18 RX ADMIN — CARVEDILOL 12.5 MG: 12.5 TABLET, FILM COATED ORAL at 20:44

## 2024-03-18 RX ADMIN — ONDANSETRON 4 MG: 2 INJECTION INTRAMUSCULAR; INTRAVENOUS at 17:41

## 2024-03-18 RX ADMIN — BUDESONIDE 500 MCG: 0.5 INHALANT RESPIRATORY (INHALATION) at 07:32

## 2024-03-18 RX ADMIN — SODIUM CHLORIDE, PRESERVATIVE FREE 10 ML: 5 INJECTION INTRAVENOUS at 20:43

## 2024-03-18 RX ADMIN — PIPERACILLIN AND TAZOBACTAM 3375 MG: 3; .375 INJECTION, POWDER, FOR SOLUTION INTRAVENOUS at 06:56

## 2024-03-18 RX ADMIN — FAMOTIDINE 20 MG: 20 TABLET, FILM COATED ORAL at 08:44

## 2024-03-18 RX ADMIN — MIDODRINE HYDROCHLORIDE 2.5 MG: 5 TABLET ORAL at 08:44

## 2024-03-18 RX ADMIN — VANCOMYCIN HYDROCHLORIDE 1750 MG: 5 INJECTION, POWDER, LYOPHILIZED, FOR SOLUTION INTRAVENOUS at 11:13

## 2024-03-18 RX ADMIN — MIDODRINE HYDROCHLORIDE 2.5 MG: 5 TABLET ORAL at 20:37

## 2024-03-18 RX ADMIN — IPRATROPIUM BROMIDE AND ALBUTEROL SULFATE 1 DOSE: .5; 3 SOLUTION RESPIRATORY (INHALATION) at 07:27

## 2024-03-18 RX ADMIN — LEVOTHYROXINE SODIUM 75 MCG: 0.07 TABLET ORAL at 06:57

## 2024-03-18 RX ADMIN — SERTRALINE HYDROCHLORIDE 200 MG: 50 TABLET ORAL at 08:44

## 2024-03-18 RX ADMIN — DILTIAZEM HYDROCHLORIDE 60 MG: 30 TABLET ORAL at 20:42

## 2024-03-18 RX ADMIN — IPRATROPIUM BROMIDE AND ALBUTEROL SULFATE 1 DOSE: .5; 3 SOLUTION RESPIRATORY (INHALATION) at 13:12

## 2024-03-18 RX ADMIN — INSULIN LISPRO 4 UNITS: 100 INJECTION, SOLUTION INTRAVENOUS; SUBCUTANEOUS at 11:55

## 2024-03-18 RX ADMIN — ARFORMOTEROL TARTRATE 15 MCG: 15 SOLUTION RESPIRATORY (INHALATION) at 07:32

## 2024-03-18 RX ADMIN — ATORVASTATIN CALCIUM 40 MG: 20 TABLET, FILM COATED ORAL at 20:36

## 2024-03-18 RX ADMIN — DANTROLENE SODIUM 25 MG: 25 CAPSULE ORAL at 20:36

## 2024-03-18 RX ADMIN — DANTROLENE SODIUM 25 MG: 25 CAPSULE ORAL at 17:32

## 2024-03-18 RX ADMIN — DILTIAZEM HYDROCHLORIDE 60 MG: 30 TABLET ORAL at 08:44

## 2024-03-18 ASSESSMENT — PAIN SCALES - GENERAL
PAINLEVEL_OUTOF10: 0
PAINLEVEL_OUTOF10: 0

## 2024-03-18 NOTE — PLAN OF CARE
Problem: Safety - Adult  Goal: Free from fall injury  Outcome: Progressing     Problem: Pain  Goal: Verbalizes/displays adequate comfort level or baseline comfort level  Outcome: Progressing     Problem: Skin/Tissue Integrity  Goal: Absence of new skin breakdown  Description: 1.  Monitor for areas of redness and/or skin breakdown  2.  Assess vascular access sites hourly  3.  Every 4-6 hours minimum:  Change oxygen saturation probe site  4.  Every 4-6 hours:  If on nasal continuous positive airway pressure, respiratory therapy assess nares and determine need for appliance change or resting period.  Outcome: Progressing     Problem: Respiratory - Adult  Goal: Achieves optimal ventilation and oxygenation  3/18/2024 1322 by Tammy Drummond RN  Outcome: Progressing  3/18/2024 0906 by Viviana Flroes, RT  Outcome: Progressing  3/18/2024 0515 by Tatum Gu, RT  Outcome: Progressing     Problem: Chronic Conditions and Co-morbidities  Goal: Patient's chronic conditions and co-morbidity symptoms are monitored and maintained or improved  Outcome: Progressing

## 2024-03-18 NOTE — PLAN OF CARE
Problem: Safety - Adult  Goal: Free from fall injury  3/17/2024 2025 by Wilda Haq RN  Outcome: Progressing  3/17/2024 1028 by Tammy Drummond RN  Outcome: Progressing  3/17/2024 0645 by Estrellita Vasquez RN  Outcome: Progressing     Problem: Pain  Goal: Verbalizes/displays adequate comfort level or baseline comfort level  3/17/2024 2025 by Wilda Haq RN  Outcome: Progressing  3/17/2024 1028 by Tammy Drummond RN  Outcome: Progressing  3/17/2024 0645 by Estrellita Vasquez RN  Outcome: Progressing     Problem: Skin/Tissue Integrity  Goal: Absence of new skin breakdown  Description: 1.  Monitor for areas of redness and/or skin breakdown  2.  Assess vascular access sites hourly  3.  Every 4-6 hours minimum:  Change oxygen saturation probe site  4.  Every 4-6 hours:  If on nasal continuous positive airway pressure, respiratory therapy assess nares and determine need for appliance change or resting period.  3/17/2024 2025 by Wilda Haq RN  Outcome: Progressing  3/17/2024 1028 by Tammy Drummond RN  Outcome: Progressing  3/17/2024 0645 by Estrellita Vasquez RN  Outcome: Progressing     Problem: Respiratory - Adult  Goal: Achieves optimal ventilation and oxygenation  3/17/2024 2025 by Wilda Haq RN  Outcome: Progressing  3/17/2024 1028 by Tammy Drummond RN  Outcome: Progressing  3/17/2024 0929 by Alejandra Urbina, RT  Outcome: Progressing

## 2024-03-18 NOTE — CONSULTS
[facility]  PRATIBHA Parks  [phone]                    GASTROENTEROLOGY CONSULTATION NOTE              NAME:  Hayder Fink   :   1955   MRN:   [id number]       Referring Physician:    Júnior      Consult Date:   3/18/2024 4:30 PM    Chief Complaint:    Aspiration Pneumonia     History of Present Illness:    Patient is a 68 y.o. with history of CVA, T1DM who is dependent on tube feeds via PEG for nutrition who is admitted with concerns for aspiration pneumonia.    We were consulted to consider converting G tube to a GJ tube.    He currently has Drew Moran G tube in place but there is concern he is aspirating his bolus feeds.      PMH:  Past Medical History:   Diagnosis Date    AF (paroxysmal atrial fibrillation) (HCC)     Anxiety and depression     Diabetes (HCC)     Dysphagia as late effect of stroke     Gastroparesis due to DM (HCC)     Hx of completed stroke     Hyperlipidemia     Hypertension     Hypothyroid     PEG (percutaneous endoscopic gastrostomy) status (HCC)     Rheumatoid arthritis (HCC)        PSH:  Past Surgical History:   Procedure Laterality Date    AMPUTATION Left     left 5 toes    HERNIA REPAIR         Allergies:  Allergies   Allergen Reactions    Amoxicillin Diarrhea    Amoxicillin-Pot Clavulanate Diarrhea       Home Medications:  Prior to Admission Medications   Prescriptions Last Dose Informant Patient Reported? Taking?   ALPRAZolam (XANAX) 0.25 MG tablet Past Month  Yes Yes   Si tablet by Per G Tube route daily as needed for Anxiety.   HYDROcodone-acetaminophen (NORCO) 5-325 MG per tablet Past Month  Yes Yes   Sig: Take 1 tablet by mouth every 6 hours as needed.   Sertraline HCl 200 MG CAPS 3/18/2024  Yes Yes   Si mg by PEG Tube route daily   albuterol (PROVENTIL) (5 MG/ML) 0.5% nebulizer solution 3/18/2024  Yes Yes   Sig: Take 0.5 mLs by nebulization every 4 hours as needed for Wheezing   albuterol sulfate HFA (PROVENTIL;VENTOLIN;PROAIR) 108

## 2024-03-19 LAB
CREAT SERPL-MCNC: 1.15 MG/DL (ref 0.7–1.3)
FOLATE SERPL-MCNC: >20 NG/ML
GLUCOSE BLD STRIP.AUTO-MCNC: 115 MG/DL (ref 65–117)
GLUCOSE BLD STRIP.AUTO-MCNC: 154 MG/DL (ref 65–117)
GLUCOSE BLD STRIP.AUTO-MCNC: 196 MG/DL (ref 65–117)
GLUCOSE BLD STRIP.AUTO-MCNC: 97 MG/DL (ref 65–117)
SERVICE CMNT-IMP: ABNORMAL
SERVICE CMNT-IMP: ABNORMAL
SERVICE CMNT-IMP: NORMAL
SERVICE CMNT-IMP: NORMAL
VANCOMYCIN SERPL-MCNC: 17.9 UG/ML

## 2024-03-19 PROCEDURE — 2580000003 HC RX 258: Performed by: HOSPITALIST

## 2024-03-19 PROCEDURE — 6360000002 HC RX W HCPCS: Performed by: HOSPITALIST

## 2024-03-19 PROCEDURE — 6370000000 HC RX 637 (ALT 250 FOR IP): Performed by: HOSPITALIST

## 2024-03-19 PROCEDURE — 6360000002 HC RX W HCPCS: Performed by: FAMILY MEDICINE

## 2024-03-19 PROCEDURE — 2700000000 HC OXYGEN THERAPY PER DAY

## 2024-03-19 PROCEDURE — 82962 GLUCOSE BLOOD TEST: CPT

## 2024-03-19 PROCEDURE — 36415 COLL VENOUS BLD VENIPUNCTURE: CPT

## 2024-03-19 PROCEDURE — 80202 ASSAY OF VANCOMYCIN: CPT

## 2024-03-19 PROCEDURE — 1100000000 HC RM PRIVATE

## 2024-03-19 PROCEDURE — 82565 ASSAY OF CREATININE: CPT

## 2024-03-19 PROCEDURE — 6370000000 HC RX 637 (ALT 250 FOR IP): Performed by: FAMILY MEDICINE

## 2024-03-19 PROCEDURE — 94761 N-INVAS EAR/PLS OXIMETRY MLT: CPT

## 2024-03-19 PROCEDURE — 94640 AIRWAY INHALATION TREATMENT: CPT

## 2024-03-19 PROCEDURE — 2580000003 HC RX 258: Performed by: FAMILY MEDICINE

## 2024-03-19 RX ADMIN — ARFORMOTEROL TARTRATE 15 MCG: 15 SOLUTION RESPIRATORY (INHALATION) at 07:15

## 2024-03-19 RX ADMIN — ASPIRIN 81 MG: 81 TABLET, CHEWABLE ORAL at 09:45

## 2024-03-19 RX ADMIN — LEVOTHYROXINE SODIUM 75 MCG: 0.07 TABLET ORAL at 05:51

## 2024-03-19 RX ADMIN — TRAZODONE HYDROCHLORIDE 150 MG: 100 TABLET ORAL at 21:24

## 2024-03-19 RX ADMIN — BUDESONIDE 500 MCG: 0.5 INHALANT RESPIRATORY (INHALATION) at 07:15

## 2024-03-19 RX ADMIN — IPRATROPIUM BROMIDE AND ALBUTEROL SULFATE 1 DOSE: .5; 3 SOLUTION RESPIRATORY (INHALATION) at 07:10

## 2024-03-19 RX ADMIN — ATORVASTATIN CALCIUM 40 MG: 20 TABLET, FILM COATED ORAL at 21:25

## 2024-03-19 RX ADMIN — Medication 1 CAPSULE: at 09:45

## 2024-03-19 RX ADMIN — IPRATROPIUM BROMIDE AND ALBUTEROL SULFATE 1 DOSE: .5; 3 SOLUTION RESPIRATORY (INHALATION) at 13:51

## 2024-03-19 RX ADMIN — PIPERACILLIN AND TAZOBACTAM 3375 MG: 3; .375 INJECTION, POWDER, FOR SOLUTION INTRAVENOUS at 02:45

## 2024-03-19 RX ADMIN — FAMOTIDINE 20 MG: 20 TABLET, FILM COATED ORAL at 09:45

## 2024-03-19 RX ADMIN — VANCOMYCIN HYDROCHLORIDE 1000 MG: 1 INJECTION, POWDER, LYOPHILIZED, FOR SOLUTION INTRAVENOUS at 06:03

## 2024-03-19 RX ADMIN — MIDODRINE HYDROCHLORIDE 2.5 MG: 5 TABLET ORAL at 09:45

## 2024-03-19 RX ADMIN — SERTRALINE HYDROCHLORIDE 200 MG: 50 TABLET ORAL at 09:45

## 2024-03-19 RX ADMIN — DANTROLENE SODIUM 25 MG: 25 CAPSULE ORAL at 21:24

## 2024-03-19 RX ADMIN — VANCOMYCIN HYDROCHLORIDE 1000 MG: 1 INJECTION, POWDER, LYOPHILIZED, FOR SOLUTION INTRAVENOUS at 23:10

## 2024-03-19 RX ADMIN — IPRATROPIUM BROMIDE AND ALBUTEROL SULFATE 1 DOSE: .5; 3 SOLUTION RESPIRATORY (INHALATION) at 19:40

## 2024-03-19 RX ADMIN — PIPERACILLIN AND TAZOBACTAM 3375 MG: 3; .375 INJECTION, POWDER, FOR SOLUTION INTRAVENOUS at 10:27

## 2024-03-19 RX ADMIN — CARVEDILOL 12.5 MG: 12.5 TABLET, FILM COATED ORAL at 21:24

## 2024-03-19 RX ADMIN — DILTIAZEM HYDROCHLORIDE 60 MG: 30 TABLET ORAL at 21:25

## 2024-03-19 RX ADMIN — DILTIAZEM HYDROCHLORIDE 60 MG: 30 TABLET ORAL at 09:45

## 2024-03-19 RX ADMIN — BUDESONIDE 500 MCG: 0.5 INHALANT RESPIRATORY (INHALATION) at 19:47

## 2024-03-19 RX ADMIN — MIDODRINE HYDROCHLORIDE 2.5 MG: 5 TABLET ORAL at 21:23

## 2024-03-19 RX ADMIN — DANTROLENE SODIUM 25 MG: 25 CAPSULE ORAL at 09:45

## 2024-03-19 RX ADMIN — FOLIC ACID 1 MG: 1 TABLET ORAL at 09:46

## 2024-03-19 RX ADMIN — DANTROLENE SODIUM 25 MG: 25 CAPSULE ORAL at 14:33

## 2024-03-19 RX ADMIN — SODIUM CHLORIDE, PRESERVATIVE FREE 10 ML: 5 INJECTION INTRAVENOUS at 21:27

## 2024-03-19 RX ADMIN — PIPERACILLIN AND TAZOBACTAM 3375 MG: 3; .375 INJECTION, POWDER, FOR SOLUTION INTRAVENOUS at 18:03

## 2024-03-19 RX ADMIN — CARVEDILOL 12.5 MG: 12.5 TABLET, FILM COATED ORAL at 09:45

## 2024-03-19 RX ADMIN — INSULIN GLARGINE 14 UNITS: 100 INJECTION, SOLUTION SUBCUTANEOUS at 09:46

## 2024-03-19 RX ADMIN — ARFORMOTEROL TARTRATE 15 MCG: 15 SOLUTION RESPIRATORY (INHALATION) at 19:47

## 2024-03-19 ASSESSMENT — PAIN SCALES - GENERAL
PAINLEVEL_OUTOF10: 0
PAINLEVEL_OUTOF10: 0

## 2024-03-20 PROBLEM — R41.82 ALTERED MENTAL STATUS: Status: ACTIVE | Noted: 2024-03-20

## 2024-03-20 PROBLEM — E10.65 TYPE 1 DIABETES MELLITUS WITH HYPERGLYCEMIA (HCC): Status: ACTIVE | Noted: 2024-03-20

## 2024-03-20 LAB
ANION GAP SERPL CALC-SCNC: 8 MMOL/L (ref 5–15)
BACTERIA SPEC CULT: NORMAL
BACTERIA SPEC CULT: NORMAL
BUN SERPL-MCNC: 30 MG/DL (ref 6–20)
BUN/CREAT SERPL: 28 (ref 12–20)
CALCIUM SERPL-MCNC: 9.2 MG/DL (ref 8.5–10.1)
CHLORIDE SERPL-SCNC: 110 MMOL/L (ref 97–108)
CO2 SERPL-SCNC: 20 MMOL/L (ref 21–32)
CREAT SERPL-MCNC: 1.08 MG/DL (ref 0.7–1.3)
GLUCOSE BLD STRIP.AUTO-MCNC: 202 MG/DL (ref 65–117)
GLUCOSE BLD STRIP.AUTO-MCNC: 286 MG/DL (ref 65–117)
GLUCOSE BLD STRIP.AUTO-MCNC: 298 MG/DL (ref 65–117)
GLUCOSE BLD STRIP.AUTO-MCNC: 318 MG/DL (ref 65–117)
GLUCOSE SERPL-MCNC: 208 MG/DL (ref 65–100)
POTASSIUM SERPL-SCNC: 4 MMOL/L (ref 3.5–5.1)
SERVICE CMNT-IMP: ABNORMAL
SERVICE CMNT-IMP: NORMAL
SODIUM SERPL-SCNC: 138 MMOL/L (ref 136–145)

## 2024-03-20 PROCEDURE — 94640 AIRWAY INHALATION TREATMENT: CPT

## 2024-03-20 PROCEDURE — 2580000003 HC RX 258: Performed by: HOSPITALIST

## 2024-03-20 PROCEDURE — 6370000000 HC RX 637 (ALT 250 FOR IP): Performed by: HOSPITALIST

## 2024-03-20 PROCEDURE — 94761 N-INVAS EAR/PLS OXIMETRY MLT: CPT

## 2024-03-20 PROCEDURE — 6360000002 HC RX W HCPCS: Performed by: HOSPITALIST

## 2024-03-20 PROCEDURE — 80048 BASIC METABOLIC PNL TOTAL CA: CPT

## 2024-03-20 PROCEDURE — 1100000000 HC RM PRIVATE

## 2024-03-20 PROCEDURE — 6370000000 HC RX 637 (ALT 250 FOR IP): Performed by: FAMILY MEDICINE

## 2024-03-20 PROCEDURE — 36415 COLL VENOUS BLD VENIPUNCTURE: CPT

## 2024-03-20 PROCEDURE — 99222 1ST HOSP IP/OBS MODERATE 55: CPT

## 2024-03-20 PROCEDURE — 82962 GLUCOSE BLOOD TEST: CPT

## 2024-03-20 RX ADMIN — SERTRALINE HYDROCHLORIDE 200 MG: 50 TABLET ORAL at 10:18

## 2024-03-20 RX ADMIN — DILTIAZEM HYDROCHLORIDE 60 MG: 30 TABLET ORAL at 10:17

## 2024-03-20 RX ADMIN — ARFORMOTEROL TARTRATE 15 MCG: 15 SOLUTION RESPIRATORY (INHALATION) at 08:16

## 2024-03-20 RX ADMIN — PIPERACILLIN AND TAZOBACTAM 3375 MG: 3; .375 INJECTION, POWDER, FOR SOLUTION INTRAVENOUS at 01:29

## 2024-03-20 RX ADMIN — FOLIC ACID 1 MG: 1 TABLET ORAL at 10:18

## 2024-03-20 RX ADMIN — DANTROLENE SODIUM 25 MG: 25 CAPSULE ORAL at 13:43

## 2024-03-20 RX ADMIN — DANTROLENE SODIUM 25 MG: 25 CAPSULE ORAL at 10:17

## 2024-03-20 RX ADMIN — IPRATROPIUM BROMIDE AND ALBUTEROL SULFATE 1 DOSE: .5; 3 SOLUTION RESPIRATORY (INHALATION) at 14:04

## 2024-03-20 RX ADMIN — BUDESONIDE 500 MCG: 0.5 INHALANT RESPIRATORY (INHALATION) at 19:49

## 2024-03-20 RX ADMIN — INSULIN LISPRO 6 UNITS: 100 INJECTION, SOLUTION INTRAVENOUS; SUBCUTANEOUS at 15:11

## 2024-03-20 RX ADMIN — LEVOTHYROXINE SODIUM 75 MCG: 0.07 TABLET ORAL at 05:23

## 2024-03-20 RX ADMIN — FAMOTIDINE 20 MG: 20 TABLET, FILM COATED ORAL at 10:17

## 2024-03-20 RX ADMIN — MIDODRINE HYDROCHLORIDE 2.5 MG: 5 TABLET ORAL at 10:17

## 2024-03-20 RX ADMIN — DANTROLENE SODIUM 25 MG: 25 CAPSULE ORAL at 20:47

## 2024-03-20 RX ADMIN — BUDESONIDE 500 MCG: 0.5 INHALANT RESPIRATORY (INHALATION) at 08:16

## 2024-03-20 RX ADMIN — ASPIRIN 81 MG: 81 TABLET, CHEWABLE ORAL at 10:18

## 2024-03-20 RX ADMIN — IPRATROPIUM BROMIDE AND ALBUTEROL SULFATE 1 DOSE: .5; 3 SOLUTION RESPIRATORY (INHALATION) at 08:07

## 2024-03-20 RX ADMIN — INSULIN LISPRO 4 UNITS: 100 INJECTION, SOLUTION INTRAVENOUS; SUBCUTANEOUS at 11:41

## 2024-03-20 RX ADMIN — INSULIN GLARGINE 14 UNITS: 100 INJECTION, SOLUTION SUBCUTANEOUS at 10:17

## 2024-03-20 RX ADMIN — Medication 1 CAPSULE: at 10:18

## 2024-03-20 RX ADMIN — PIPERACILLIN AND TAZOBACTAM 3375 MG: 3; .375 INJECTION, POWDER, FOR SOLUTION INTRAVENOUS at 11:40

## 2024-03-20 RX ADMIN — CARVEDILOL 12.5 MG: 12.5 TABLET, FILM COATED ORAL at 10:18

## 2024-03-20 RX ADMIN — TIZANIDINE 4 MG: 2 TABLET ORAL at 13:43

## 2024-03-20 RX ADMIN — MIDODRINE HYDROCHLORIDE 2.5 MG: 5 TABLET ORAL at 20:47

## 2024-03-20 RX ADMIN — SODIUM CHLORIDE, PRESERVATIVE FREE 5 ML: 5 INJECTION INTRAVENOUS at 20:57

## 2024-03-20 RX ADMIN — PIPERACILLIN AND TAZOBACTAM 3375 MG: 3; .375 INJECTION, POWDER, FOR SOLUTION INTRAVENOUS at 18:56

## 2024-03-20 RX ADMIN — TRAZODONE HYDROCHLORIDE 150 MG: 100 TABLET ORAL at 20:46

## 2024-03-20 RX ADMIN — DILTIAZEM HYDROCHLORIDE 60 MG: 30 TABLET ORAL at 20:50

## 2024-03-20 RX ADMIN — CARVEDILOL 12.5 MG: 12.5 TABLET, FILM COATED ORAL at 20:47

## 2024-03-20 RX ADMIN — IPRATROPIUM BROMIDE AND ALBUTEROL SULFATE 1 DOSE: .5; 3 SOLUTION RESPIRATORY (INHALATION) at 19:42

## 2024-03-20 RX ADMIN — ATORVASTATIN CALCIUM 40 MG: 20 TABLET, FILM COATED ORAL at 20:47

## 2024-03-20 RX ADMIN — ARFORMOTEROL TARTRATE 15 MCG: 15 SOLUTION RESPIRATORY (INHALATION) at 19:49

## 2024-03-20 NOTE — PLAN OF CARE
Problem: Safety - Adult  Goal: Free from fall injury  Outcome: Progressing     Problem: Pain  Goal: Verbalizes/displays adequate comfort level or baseline comfort level  Outcome: Progressing     Problem: Skin/Tissue Integrity  Goal: Absence of new skin breakdown  Description: 1.  Monitor for areas of redness and/or skin breakdown  2.  Assess vascular access sites hourly  3.  Every 4-6 hours minimum:  Change oxygen saturation probe site  4.  Every 4-6 hours:  If on nasal continuous positive airway pressure, respiratory therapy assess nares and determine need for appliance change or resting period.  Outcome: Progressing     Problem: Respiratory - Adult  Goal: Achieves optimal ventilation and oxygenation  3/20/2024 1223 by Tammy Drummond RN  Outcome: Progressing  3/20/2024 1043 by Viviana Flores, RT  Outcome: Progressing  3/20/2024 0245 by Tatum Gu, RT  Outcome: Progressing     Problem: Chronic Conditions and Co-morbidities  Goal: Patient's chronic conditions and co-morbidity symptoms are monitored and maintained or improved  Outcome: Progressing     Problem: Nutrition Deficit:  Goal: Optimize nutritional status  Outcome: Progressing

## 2024-03-20 NOTE — CARE COORDINATION
03/20/24 1018   Service Assessment   Patient Orientation Alert and Oriented   Cognition Alert   History Provided By Spouse   Primary Caregiver Spouse   Support Systems Spouse/Significant Other   Patient's Healthcare Decision Maker is: Legal Next of Kin  (Shavon Fink (Spouse)  [phone])   Prior Functional Level Assistance with the following:;Bathing;Dressing;Toileting;Housework;Cooking;Feeding;Shopping;Mobility   Current Functional Level Assistance with the following:;Bathing;Dressing;Toileting;Feeding;Cooking;Housework;Shopping;Mobility   Can patient return to prior living arrangement Yes   Family able to assist with home care needs: Yes   Would you like for me to discuss the discharge plan with any other family members/significant others, and if so, who? Yes   Social/Functional History   Lives With Spouse   Type of Home Apartment   Active  No   Patient's  Info Shavon Fink (Spouse)  [phone]     Hospital bed orders sent on yesterday evening after meeting briefly with Pt's spouse. Pt requires total assistance with all ADLs. Pt receives tube feed supplies from [facility] [place]. Pt lives with spouse, spouse is primary caregiver.  Spouse anticipates Pt discharging home at discharge.     Pt will need S transport arranged.       KATHY Rodriguez, CM  [facility] Care Manager  [phone]

## 2024-03-20 NOTE — CONSULTS
[facility]  DIABETES MANAGEMENT CONSULT    Consulted by Provider for advanced nursing evaluation and care for inpatient blood glucose management.    Evaluation and Action Plan   Hayder Fink is a 68 y.o. male with T1D, gastroparesis, multiple strokes (2020, hemorrhagic in 2021), HTN, HLD, RA, PEG tube, who was brought in by his wife with fever and AMS. He was found to have a LLL collapse and aspiration pneumonia. GI consulted (see note). Since his stroke in 2021, he has been dependant on TF. As for his diabetes,he see endocrinologist Libby Muller. Wife states he is a brittle diabetic. He wears Dexcom CGM and Bg in target range 55-60% of the time. Wife states he has low Bg about twice a month, usually in the overnight period and states he can be sensitive to the Lantus. Wife gives 4 units Humalog insulin before each feeding 5 times a day if BG > 150, but does not give Humalog after 5 or patient's Bg will drop overnight. His A1c is an acceptable 7.2%.     His Bg have been a bit labile since admission, depending on tube feeds. Current tube feeding was recently changed to Osmolite 1.5 for a cyclic feed from 6am to 10 pm. At home, he was receiving about 240 grams CHO/day with feedings, but may get more Humalog coverage. Right now receiving 203 grams CHO per day. TF almost to goal at this time. He is getting correctional insulin as ordered. He may need a higher Lantus dose tomorrow, but will see how he does overnight, given his history of low bG overnight. Will try to touch base with Dr. Muller's office in the morning.     Management Rationale Action Plan   Medication   Basal needs Using 0.2 units/kg/D based on weight  Lantus 14 units daily   Nutritional needs Will cover with basal dose  203 grams CHO/day once at goal      Corrective insulin Using medium dose sensitivity based on weight         Diabetes Discharge Plan   Medication     Referral  []        Outpatient diabetes

## 2024-03-21 VITALS
HEIGHT: 71 IN | WEIGHT: 156.9 LBS | RESPIRATION RATE: 18 BRPM | TEMPERATURE: 98.4 F | SYSTOLIC BLOOD PRESSURE: 151 MMHG | HEART RATE: 90 BPM | BODY MASS INDEX: 21.97 KG/M2 | DIASTOLIC BLOOD PRESSURE: 81 MMHG | OXYGEN SATURATION: 99 %

## 2024-03-21 LAB
ANION GAP SERPL CALC-SCNC: 6 MMOL/L (ref 5–15)
BACTERIA SPEC CULT: NORMAL
BACTERIA SPEC CULT: NORMAL
BUN SERPL-MCNC: 31 MG/DL (ref 6–20)
BUN/CREAT SERPL: 28 (ref 12–20)
CALCIUM SERPL-MCNC: 8.9 MG/DL (ref 8.5–10.1)
CHLORIDE SERPL-SCNC: 111 MMOL/L (ref 97–108)
CO2 SERPL-SCNC: 22 MMOL/L (ref 21–32)
CREAT SERPL-MCNC: 1.09 MG/DL (ref 0.7–1.3)
GLUCOSE BLD STRIP.AUTO-MCNC: 249 MG/DL (ref 65–117)
GLUCOSE BLD STRIP.AUTO-MCNC: 328 MG/DL (ref 65–117)
GLUCOSE SERPL-MCNC: 247 MG/DL (ref 65–100)
MAGNESIUM SERPL-MCNC: 2 MG/DL (ref 1.6–2.4)
PHOSPHATE SERPL-MCNC: 2.5 MG/DL (ref 2.6–4.7)
POTASSIUM SERPL-SCNC: 3.9 MMOL/L (ref 3.5–5.1)
SERVICE CMNT-IMP: ABNORMAL
SERVICE CMNT-IMP: ABNORMAL
SERVICE CMNT-IMP: NORMAL
SERVICE CMNT-IMP: NORMAL
SODIUM SERPL-SCNC: 139 MMOL/L (ref 136–145)

## 2024-03-21 PROCEDURE — 6360000002 HC RX W HCPCS: Performed by: HOSPITALIST

## 2024-03-21 PROCEDURE — 80048 BASIC METABOLIC PNL TOTAL CA: CPT

## 2024-03-21 PROCEDURE — 6370000000 HC RX 637 (ALT 250 FOR IP): Performed by: FAMILY MEDICINE

## 2024-03-21 PROCEDURE — 94761 N-INVAS EAR/PLS OXIMETRY MLT: CPT

## 2024-03-21 PROCEDURE — 36415 COLL VENOUS BLD VENIPUNCTURE: CPT

## 2024-03-21 PROCEDURE — 99231 SBSQ HOSP IP/OBS SF/LOW 25: CPT

## 2024-03-21 PROCEDURE — 6370000000 HC RX 637 (ALT 250 FOR IP): Performed by: HOSPITALIST

## 2024-03-21 PROCEDURE — 94640 AIRWAY INHALATION TREATMENT: CPT

## 2024-03-21 PROCEDURE — 6370000000 HC RX 637 (ALT 250 FOR IP)

## 2024-03-21 PROCEDURE — 83735 ASSAY OF MAGNESIUM: CPT

## 2024-03-21 PROCEDURE — 84100 ASSAY OF PHOSPHORUS: CPT

## 2024-03-21 PROCEDURE — 2580000003 HC RX 258: Performed by: HOSPITALIST

## 2024-03-21 PROCEDURE — 82962 GLUCOSE BLOOD TEST: CPT

## 2024-03-21 RX ORDER — INSULIN GLARGINE 100 [IU]/ML
16 INJECTION, SOLUTION SUBCUTANEOUS DAILY
Status: DISCONTINUED | OUTPATIENT
Start: 2024-03-21 | End: 2024-03-21 | Stop reason: HOSPADM

## 2024-03-21 RX ORDER — INSULIN LISPRO 100 [IU]/ML
12 INJECTION, SOLUTION INTRAVENOUS; SUBCUTANEOUS ONCE
Status: COMPLETED | OUTPATIENT
Start: 2024-03-21 | End: 2024-03-21

## 2024-03-21 RX ORDER — FUROSEMIDE 40 MG/1
40 TABLET ORAL DAILY PRN
Qty: 60 TABLET | Refills: 3 | Status: SHIPPED | OUTPATIENT
Start: 2024-03-21

## 2024-03-21 RX ADMIN — Medication 1 CAPSULE: at 09:30

## 2024-03-21 RX ADMIN — INSULIN LISPRO 6 UNITS: 100 INJECTION, SOLUTION INTRAVENOUS; SUBCUTANEOUS at 11:38

## 2024-03-21 RX ADMIN — INSULIN GLARGINE 16 UNITS: 100 INJECTION, SOLUTION SUBCUTANEOUS at 09:29

## 2024-03-21 RX ADMIN — ASPIRIN 81 MG: 81 TABLET, CHEWABLE ORAL at 09:30

## 2024-03-21 RX ADMIN — IPRATROPIUM BROMIDE AND ALBUTEROL SULFATE 1 DOSE: .5; 3 SOLUTION RESPIRATORY (INHALATION) at 13:44

## 2024-03-21 RX ADMIN — ARFORMOTEROL TARTRATE 15 MCG: 15 SOLUTION RESPIRATORY (INHALATION) at 07:29

## 2024-03-21 RX ADMIN — BUDESONIDE 500 MCG: 0.5 INHALANT RESPIRATORY (INHALATION) at 07:29

## 2024-03-21 RX ADMIN — PIPERACILLIN AND TAZOBACTAM 3375 MG: 3; .375 INJECTION, POWDER, FOR SOLUTION INTRAVENOUS at 09:28

## 2024-03-21 RX ADMIN — FOLIC ACID 1 MG: 1 TABLET ORAL at 09:31

## 2024-03-21 RX ADMIN — SERTRALINE HYDROCHLORIDE 200 MG: 50 TABLET ORAL at 09:30

## 2024-03-21 RX ADMIN — DANTROLENE SODIUM 25 MG: 25 CAPSULE ORAL at 15:44

## 2024-03-21 RX ADMIN — IPRATROPIUM BROMIDE AND ALBUTEROL SULFATE 1 DOSE: .5; 3 SOLUTION RESPIRATORY (INHALATION) at 07:29

## 2024-03-21 RX ADMIN — CARVEDILOL 12.5 MG: 12.5 TABLET, FILM COATED ORAL at 09:31

## 2024-03-21 RX ADMIN — FAMOTIDINE 20 MG: 20 TABLET, FILM COATED ORAL at 09:31

## 2024-03-21 RX ADMIN — PIPERACILLIN AND TAZOBACTAM 3375 MG: 3; .375 INJECTION, POWDER, FOR SOLUTION INTRAVENOUS at 01:44

## 2024-03-21 RX ADMIN — INSULIN LISPRO 10 UNITS: 100 INJECTION, SOLUTION INTRAVENOUS; SUBCUTANEOUS at 15:44

## 2024-03-21 RX ADMIN — INSULIN LISPRO 4 UNITS: 100 INJECTION, SOLUTION INTRAVENOUS; SUBCUTANEOUS at 09:29

## 2024-03-21 RX ADMIN — DANTROLENE SODIUM 25 MG: 25 CAPSULE ORAL at 09:30

## 2024-03-21 RX ADMIN — LEVOTHYROXINE SODIUM 75 MCG: 0.07 TABLET ORAL at 05:46

## 2024-03-21 RX ADMIN — MIDODRINE HYDROCHLORIDE 2.5 MG: 5 TABLET ORAL at 09:31

## 2024-03-21 RX ADMIN — DILTIAZEM HYDROCHLORIDE 60 MG: 30 TABLET ORAL at 09:31

## 2024-03-21 NOTE — PROGRESS NOTES
Physician Progress Note      PATIENT:               GRACE ORTIZ  CSN #:                  [id number]  :                       1955  ADMIT DATE:       3/15/2024 6:33 PM  DISCH DATE:  RESPONDING  PROVIDER #:        Millicent Loco MD        QUERY TEXT:    Type of Encephalopathy: Please provide further specificity, if known.    Clinical indicators include: fever, acute, encephalopathy, hyponatremia,   hyperglycemia, hypoglycemic, ckd, febrile  Options provided:  -- Anoxic/hypoxic encephalopathy  -- Metabolic encephalopathy  -- Toxic encephalopathy  -- Hepatic encephalopathy  -- Hypertensive encephalopathy  -- Other - I will add my own diagnosis  -- Disagree - Not applicable / Not valid  -- Disagree - Clinically Unable to determine / Unknown        PROVIDER RESPONSE TEXT:    The patient has metabolic encephalopathy.          QUERY TEXT:    Patient admitted with aspiration pna, possible UTI, and encephalopathy. Noted   to have \"moderate malnutrition\" documented in 3/18 RD assessment. If possible,   please document in progress notes and discharge summary if you are evaluating   and /or treating any of the following:    The medical record reflects the following:  Risk Factors: CVA, CKD  Clinical Indicators: 3/18 RD: Malnutrition Status:  Moderate malnutrition   (24 1321)  Context:  Chronic Illness  Findings of the 6 clinical characteristics of malnutrition:  Energy Intake:  Mild decrease in energy intake  Weight Loss:  Mild weight loss  Body Fat Loss:  Mild body fat loss  Muscle Mass Loss:  Mild muscle mass loss  Treatment: RD assessment, TF, monitoring    ASPEN Criteria:    https://aspenjournals.onlinelibrary.oshea.com/doi/full/10.1177/658175320269344  5    Thank you,    Daisy Blum RN  CDI  Options provided:  -- Protein calorie malnutrition moderate  -- Other - I will add my own diagnosis  -- Disagree - Not applicable / Not valid  -- Disagree - Clinically unable to determine / Unknown  -- Refer to 
[facility]  [address] [place] [address] [phone]    [facility]                                                                                          Hospitalist Progress Note  Millicent Callejas MD        Date of Service:  3/18/2024  NAME:  Hayder Fink  :  1955  MRN:  [id number]      Admission Summary:   64-year-old male is admitted due to aspiration pneumonia    Interval history / Subjective:   Per patient's wife at bedside he seems more awake and alert and seems to be feeling better     Assessment & Plan:     Fever with acute encephalopathy (increased agitation from baseline), POA  Probable aspiration pneumonia, POA hx of recurrent aspiration pneumonia, dysphagia from prior stroke s/p G-tube placement  LLL collapse on CXR confirmed on CT scan of the chest; findings progressed from chest x-ray done on 2022.  Tmax of 100.6 on 3/17.  No leukocytosis.  Reassuringly not requiring supplemental oxygen.  Treating for aspiration pneumonia with IV Zosyn.  Added vancomycin today and will check MRSA  Tube feeding resumed per dietary recommendations  Wife will be discussing with gastroenterology on switching feeding tube to gastrojejunal tube decrease chances of aspiration.  Will consult GI today     Pyuria  Urine cultures contaminated  Above antibiotics to cover for any possible UTI.     Hyperkalemia 5.8, POA  Hyponatremia  Possibly from hypovolemia and fluid shifts.    Hypokalemia resolved with D5, insulin and DuoNeb.    Continue to closely monitor.     DM type 1, brittle per wife, A1c 7.2  Hyperglycemia  Patient's blood sugars appear to be decently controlled at home with A1c of 7.5.    Hyperglycemia and hypoglycemic episodes in the last 24 hours.  Continue current regimen with close monitoring.     Hx CVA 2020 left MCA s/p thrombectomy with residual right spasticity and weakness  Hx right basal ganglia thalamic 
Chart reviewed.  If family would like to proceed, can place jejunostomy on Thursday.  Will talk with family.    Dioni Vázquez MD    
Comprehensive Nutrition Assessment    Type and Reason for Visit:  Reassess    Nutrition Recommendations/Plan:   Advance TF to initial home goal - increase rate outpatient as tolerated:  Osmolite 1.5 (or equivalent) @ 60 mL x 16 hours   (Goal volume 960 mL/day, ex: 6 am - 10 pm)   mL q 4 hours   Advance by 5 mL q 4 hours until goal reached  Ordered Mg/Phos check     Malnutrition Assessment:  Malnutrition Status:  Moderate malnutrition (03/18/24 1321)    Context:  Chronic Illness     Findings of the 6 clinical characteristics of malnutrition:  Energy Intake:  Mild decrease in energy intake (Comment)  Weight Loss:  Mild weight loss (specify amount and time period)     Body Fat Loss:  Mild body fat loss     Muscle Mass Loss:  Mild muscle mass loss    Fluid Accumulation:  No significant fluid accumulation     Strength:  Not Performed    Nutrition Assessment:     3/20: Follow up. Met with wife and patient; wife reports patient tolerating cyclic feeds very well, no recent emesis. Abd soft, no c/o pain. BM yesterday. Discussed at length with wife increasing rate to home goal volume of 960 mL/day (60 mL/hr x 16 hours). Agreeable to slowly advance today. Wife reports that since patient is tolerating cyclic feeds via G tube she prefers to forgo J tube placement, continue G feeds. BG over last 24 hours fair. Discussed with [facility], who will follow up on patient. Nursing with no complaints, discussed increasing rate.    TF at new goal 60 mL x 16 hours (960 mL/day) provides 1440 kcal (80% needs), 203 g carbs, 60 g protein (75% needs). TF + FWF provides 1422 mL H2O/day.    Recommend outpt RD visit to continue adjusting TF up to meeting 100% of needs. Or if unable to tolerate feeds at sufficient rate, strongly consider changing tube to GJ.      Nutrition Related Findings:      Wound Type: None     Last BM: 03/19/24  Edema: None                    Nutr. Labs:    Lab Results   Component Value 
Comprehensive Nutrition Assessment    Type and Reason for Visit: Initial    Nutrition Recommendations/Plan:   Adjusted TF - trial Continuous feeds 2/2 Gastroparesis and intolerance of bolus to meet needs  New TF - order: Changed Formula to Osmolite 1.5 (instead of 1.2).     Osmolite 1.5 @ 20 mL/hr. Increase rate 10 mL/hr Q8H to goal rate of 40 mL/hr. Flush 100 mL Q4H.        Malnutrition Assessment:  Malnutrition Status:  Moderate malnutrition (03/18/24 1321)    Context:  Chronic Illness     Findings of the 6 clinical characteristics of malnutrition:  Energy Intake:  Mild decrease in energy intake (Comment)  Weight Loss:  Mild weight loss (specify amount and time period)     Body Fat Loss:  Mild body fat loss     Muscle Mass Loss:  Mild muscle mass loss    Fluid Accumulation:  No significant fluid accumulation     Strength:  Not Performed       Nutrition Assessment:    Past medical hx:       Diagnosis Date    AF (paroxysmal atrial fibrillation) (HCC)     Anxiety and depression     Diabetes (HCC)     Dysphagia as late effect of stroke     Gastroparesis due to DM (HCC)     Hx of completed stroke     Hyperlipidemia     Hypertension     Hypothyroid     PEG (percutaneous endoscopic gastrostomy) status (HCC)     Rheumatoid arthritis (HCC)      Pt with gastroparesis. Pt has been receiving Bolus feeds- spouse reports feeds of 6 oz (~180 mL) every 3-4 hrs normally. Pt with brittle DM and gastroparesis. Discussed with spouse regarding possible GJ tube conversion - pros and cons of types of tube and discussed how even with GJ tube for feeding, pt could still potentially get PNA from inability to handle secretions but would decrease likelihood of aspirating Tube feed.  Pt observed coughing up some whitish secretions. Spouse states this happens regularly especially after waking up. Discussed changing TF regimen to continuous with a break at night, using a more calorie dense formula and seeing if he tolerates it better. 
[facility] Dosing Services: Antimicrobial Stewardship Daily Doc  Consult for antibiotic dosing of vancomycin by Dr. Callejas  Indication: HAP  Day of Therapy: 2    Last level: 17.9 mg/L ~12h after the dose (~auc 541)    Plan: continue 1g q18h regimen.    Margo Monzon, PharmD, BCPS  
[facility]  [address] [place] [address] [phone]    [facility]                                                                                          Hospitalist Progress Note  Millicent Callejas MD        Date of Service:  3/19/2024  NAME:  Hayder Fink  :  1955  MRN:  [id number]      Admission Summary:   64-year-old male is admitted due to aspiration pneumonia    Interval history / Subjective:   Doing better with feedings. Discussed with wife at bedside      Assessment & Plan:     Fever with acute encephalopathy (increased agitation from baseline), POA  Probable aspiration pneumonia, POA hx of recurrent aspiration pneumonia, dysphagia from prior stroke s/p G-tube placement  LLL collapse on CXR confirmed on CT scan of the chest; findings progressed from chest x-ray done on 2022.  Tmax of 100.6 on 3/17.  No leukocytosis.  Reassuringly not requiring supplemental oxygen.  Treating for aspiration pneumonia with IV Zosyn.  Added vancomycin today and will check MRSA  Tube feeding resumed per dietary recommendations  Wife will be discussing with gastroenterology on switching feeding tube to gastrojejunal tube decrease chances of aspiration. GI evaluated and discussed options with wife. They recommended consulting gen surg for J tube consideration. They advised keeping G tube in place even if J tube is placed.      Pyuria  Urine cultures contaminated  Above antibiotics to cover for any possible UTI.     Hyperkalemia 5.8, POA  Hyponatremia  Possibly from hypovolemia and fluid shifts.    Hypokalemia resolved with D5, insulin and DuoNeb.    Continue to closely monitor.     DM type 1, brittle per wife, A1c 7.2  Hyperglycemia  Patient's blood sugars appear to be decently controlled at home with A1c of 7.5.    Hyperglycemia and hypoglycemic episodes in the last 24 hours.  Continue current regimen with close monitoring.   
[facility]  Mr. Fink was in bed.  He does not speak but follows everything with his eyes. His wife said they are suppose to be discharged this afternoon. Prayer for spiritual communion offered for Mr. Fink and stood at the foot of the bed so he could see me.  Assured of continued prayer for his well-being.     Sr. JCARLOS Jean, RN, ACSW, LCSW   Page:  [phone]  
[facility] Dosing Services: Antimicrobial Stewardship Daily Doc  Consult for antibiotic dosing of vancomycin by Dr. Callejas  Indication: HAP  Day of Therapy: 1    Ht Readings from Last 1 Encounters:   03/15/24 1.803 m (5' 11\")        Wt Readings from Last 1 Encounters:   03/15/24 71.2 kg (156 lb 14.4 oz)      Vancomycin therapy:  Loading dose: Vancomycin 1750 mg x1 dose now/given  Maintenance dose: Vancomycin 1000 mg IV every 18 hours   Dose calculated to approximate a           a. Target AUC/ADRIAN of 400-600          b. Trough of 15-20 mcg/mL   Last level:  mcg/mL  Plan: WBC 8.3, Scr 1.26 improving, afebrile. Procal 0.29 on 3/15. Start vanc 1g q18h regimen. Obtain level fore 3rd dose.       Non-Kinetic Antimicrobial Dosing Regimen:   Current Regimen:  Zosyn q8h  Recommendation: continue      Other Antimicrobial   (not dosed by pharmacist)    Cultures 3/18: MRSA:  3/15: Urine: 50k pseudomonas - f     Serum Creatinine Lab Results   Component Value Date/Time    CREATININE 1.26 03/18/2024 02:58 AM          Creatinine Clearance Estimated Creatinine Clearance: 57 mL/min (based on SCr of 1.26 mg/dL).     Temp Temp: 99 °F (37.2 °C) (Oral)       WBC Lab Results   Component Value Date/Time    WBC 8.3 03/18/2024 02:58 AM          Procalcitonin Lab Results   Component Value Date/Time    PROCAL 0.29 03/15/2024 06:50 PM      For Antifungals, Metronidazole and Nafcillin: Lab Results   Component Value Date/Time    ALT 26 12/04/2022 10:12 AM    AST 29 12/04/2022 10:12 AM        Pharmacist: Margo Monzon, JsD, BCPS  [phone]    
[facility]  [address] [place] [address] [phone]    [facility]                                                                                          Hospitalist Progress Note  Millicent Callejas MD        Date of Service:  3/20/2024  NAME:  Hayder Fink  :  1955  MRN:  [id number]      Admission Summary:   64-year-old male is admitted due to aspiration pneumonia    Interval history / Subjective:   Doing better with feedings. Discussed with wife at bedside, she would like to hold off and see how patient does with cyclic feeds before making a definitive decision regarding J-tube.  This will most likely be outpatient     Assessment & Plan:     Fever with acute encephalopathy (increased agitation from baseline), POA  Probable aspiration pneumonia, POA hx of recurrent aspiration pneumonia, dysphagia from prior stroke s/p G-tube placement  LLL collapse on CXR confirmed on CT scan of the chest; findings progressed from chest x-ray done on 2022.  Tmax of 100.6 on 3/17.  No leukocytosis.  Reassuringly not requiring supplemental oxygen.  Treating for aspiration pneumonia with IV Zosyn.  Added vancomycin today and will check MRSA  Tube feeding resumed per dietary recommendations  Wife will be discussing with gastroenterology on switching feeding tube to gastrojejunal tube decrease chances of aspiration. GI evaluated and discussed options with wife. They recommended consulting gen surg for J tube consideration. They advised keeping G tube in place even if J tube is placed.      Pyuria  Urine cultures contaminated  Above antibiotics to cover for any possible UTI.     Hyperkalemia 5.8, POA  Hyponatremia  Possibly from hypovolemia and fluid shifts.    Hypokalemia resolved with D5, insulin and DuoNeb.    Continue to closely monitor.     DM type 1, brittle per wife, A1c 7.2  Hyperglycemia  Patient's blood sugars appear to be 
Vancomycin dose changed from 1.5g to 1.75g x1 per protocol    Margo Monzon, JsD, BCPS  
Will discuss patient with wife and make plan for possible jejunostomy.    Dioni Vázquez MD    
Felicita-LAKIA(1300)    
s/p Watchman  Continue diltiazem and coreg bid.  Hold if sbp <100 or pulse <60.    Anemia of chronic disease  Likely at baseline; noted drop in hemoglobin to 7.6, suspecting dilutional.    Continue to monitor as currently no indications for PRBC transfusion.     History of hypotension  Continue midodrine 2.5mg bid; currently normotensive.     Hypothyroid  Continue levothyroxine     GERD  Pepcid to continue.     PAD s/p left TMA 1/2020     Hx MRSA PNA 8/2021, was intubated                Care Plan discussed with: Patient, Family, and Nursing Staff    Prophylaxis:  Lovenox    Disposition:  Home w/Family           ___________________________________________________    Attending Physician: Darron Curtis MD        Subjective:     Chief Complaint: Lying comfortably in bed.  Wife at bedside.  Patient nods when I said good morning, but poor historian otherwise.  Wife thinks that he is doing better today.     Objective:       Vitals:     Last 24hrs VS reviewed since prior progress note. Most recent are:    Vitals:    03/17/24 0844   BP: 121/69   Pulse: 79   Resp: 18   Temp: 98.2 °F (36.8 °C)   SpO2: 97%     SpO2 Readings from Last 6 Encounters:   03/17/24 97%    PreHospital Care  Analgesics Taken or Received PTA?: Yes (Tylenol around 1600)     Intake/Output Summary (Last 24 hours) at 3/17/2024 1043  Last data filed at 3/17/2024 0904  Gross per 24 hour   Intake 1000 ml   Output --   Net 1000 ml            Exam:     Physical Exam:    Gen:  No acute distress.  Nontoxic-appearing, however chronically ill-appearing.  HEENT:  NC/AT.   Neck:  Supple.  Resp:  Unlabored breathing at rest with reduced basilar breath sounds, left greater than right.  Card:  Regular rate and rhythm. Normal S1 and S2.   Abd:  Soft, no obvious tenderness.  G-tube in place: Surrounding with mild erythema.  Ext: No clubbing, cyanosis or edema.  Neuro: Baseline flaccid left hemiparesis.    Medications Reviewed: (see below)    Lab Data Reviewed: (see 
hemiparesis.    Medications Reviewed: (see below)    Lab Data Reviewed: (see below)  ______________________________________________________________________    Medications:     Current Facility-Administered Medications   Medication Dose Route Frequency    ipratropium 0.5 mg-albuterol 2.5 mg (DUONEB) nebulizer solution 1 Dose  1 Dose Inhalation Q6H WA RT    insulin lispro (HUMALOG) injection vial 0-8 Units  0-8 Units SubCUTAneous TID WC    insulin lispro (HUMALOG) injection vial 0-4 Units  0-4 Units SubCUTAneous Nightly    glucose chewable tablet 16 g  4 tablet Oral PRN    dextrose bolus 10% 125 mL  125 mL IntraVENous PRN    Or    dextrose bolus 10% 250 mL  250 mL IntraVENous PRN    glucagon injection 1 mg  1 mg SubCUTAneous PRN    dextrose 10 % infusion   IntraVENous Continuous PRN    piperacillin-tazobactam (ZOSYN) 3,375 mg in sodium chloride 0.9 % 50 mL IVPB (mini-bag)  3,375 mg IntraVENous Q8H    sodium chloride flush 0.9 % injection 5-40 mL  5-40 mL IntraVENous 2 times per day    sodium chloride flush 0.9 % injection 5-40 mL  5-40 mL IntraVENous PRN    0.9 % sodium chloride infusion   IntraVENous PRN    potassium chloride (KLOR-CON) extended release tablet 40 mEq  40 mEq Oral PRN    Or    potassium bicarb-citric acid (EFFER-K) effervescent tablet 40 mEq  40 mEq Oral PRN    Or    potassium chloride 10 mEq/100 mL IVPB (Peripheral Line)  10 mEq IntraVENous PRN    magnesium sulfate 2000 mg in 50 mL IVPB premix  2,000 mg IntraVENous PRN    enoxaparin (LOVENOX) injection 40 mg  40 mg SubCUTAneous Daily    ondansetron (ZOFRAN-ODT) disintegrating tablet 4 mg  4 mg Oral Q8H PRN    Or    ondansetron (ZOFRAN) injection 4 mg  4 mg IntraVENous Q6H PRN    polyethylene glycol (GLYCOLAX) packet 17 g  17 g Oral Daily PRN    acetaminophen (TYLENOL) tablet 650 mg  650 mg Oral Q6H PRN    Or    acetaminophen (TYLENOL) suppository 650 mg  650 mg Rectal Q6H PRN    dextrose 5 % and 0.9 % sodium chloride infusion   IntraVENous Continuous

## 2024-03-21 NOTE — DISCHARGE SUMMARY
7.2  Hyperglycemia  Patient's blood sugars appear to be decently controlled at home with A1c of 7.5.    Hyperglycemia and hypoglycemic episodes in the last 24 hours.  Continue current regimen with close monitoring.     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     Hx afib s/p Watchman  Continue diltiazem and coreg bid.  Hold if sbp <100 or pulse <60.     Anemia of chronic disease  Likely at baseline; noted drop in hemoglobin to 7.6, suspecting dilutional.    Continue to monitor as currently no indications for PRBC transfusion.     History of hypotension  Continue midodrine 2.5mg bid; currently normotensive.     Hypothyroid  Continue levothyroxine     GERD  Pepcid to continue.     PAD s/p left TMA 1/2020     Hx MRSA PNA 8/2021, was intubated        PENDING TEST RESULTS:   At the time of discharge the following test results are still pending: None    FOLLOW UP APPOINTMENTS:    Fermin Slade MD  [address] [facility] [address] [phone]    Follow up      Dioni Vázquez MD  [address] [facility] [address] [facility] [address]  [facility] [address] [phone]    Follow up           DIET: Tube feeds    ACTIVITY: As tolerated      DISCHARGE MEDICATIONS:     Medication List        CHANGE how you take these medications      furosemide 40 MG tablet  Commonly known as: LASIX  1 tablet by PEG Tube route daily as needed (Swelling)  What changed:   when to take this  reasons to take this            CONTINUE taking these medications      * albuterol (5 MG/ML) 0.5% nebulizer solution  Commonly known as:

## 2024-03-21 NOTE — CARE COORDINATION
ANJALI contacted [facility] to follow-up on referral. CM was informed by representative that CM would need to fax updated orders to their intake office. CM informed representative that information had already been sent through [employer] on 3/20. Intake reports that they do not have access to [employer]. CM was informed that they do not have access to [employer] at this time and information would need to be faxed to: [phone].

## 2024-03-21 NOTE — CONSULTS
[facility]  DIABETES MANAGEMENT CONSULT    Consulted by Provider for advanced nursing evaluation and care for inpatient blood glucose management.    Evaluation and Action Plan   Hayder Fink is a 68 y.o. male with T1D, gastroparesis, multiple strokes (2020, hemorrhagic in 2021), HTN, HLD, RA, PEG tube, who was brought in by his wife with fever and AMS. He was found to have a LLL collapse and aspiration pneumonia. GI consulted (see note). Since his stroke in 2021, he has been dependant on TF. As for his diabetes,he see endocrinologist Libby Muller. Wife states he is a brittle diabetic. He wears Dexcom CGM and Bg in target range 55-60% of the time. Wife states he has low Bg about twice a month, usually in the overnight period and states he can be sensitive to the Lantus. Wife gives 4 units Humalog insulin before each feeding 5 times a day if BG > 150, but does not give Humalog after 5 or patient's Bg will drop overnight. His A1c is an acceptable 7.2%.     His Bg have been a bit labile since admission, depending on tube feeds. Current tube feeding was recently changed to Osmolite 1.5 for a cyclic feed from 6am to 10 pm. At home, he was receiving about 240 grams CHO/day with feedings, but may get more Humalog coverage. Right now receiving 203 grams CHO per day. TF almost to goal at this time. He is getting correctional insulin as ordered. He may need a higher Lantus dose tomorrow, but will see how he does overnight, given his history of low bG overnight. Will try to touch base with Dr. Muller's office in the morning.     3/21 - BG remained high through the evening with TF. FBG was 249. Increased basal dose this morning just slightly based on patient's history of being very brittle and BG dropping in the overnight period. Spoke to wife about possiblity of utilizing an NPH insulin at the beginning of TF (now this will be continuous from 6am -10pm). I tried to get in touch with

## 2024-03-21 NOTE — PLAN OF CARE
Problem: Safety - Adult  Goal: Free from fall injury  3/21/2024 1153 by Tammy Drummond RN  Outcome: Progressing  3/20/2024 2344 by Maxine Ulloa RN  Outcome: Progressing     Problem: Pain  Goal: Verbalizes/displays adequate comfort level or baseline comfort level  3/21/2024 1153 by Tammy Drummond RN  Outcome: Progressing  3/20/2024 2344 by Maxine Ulloa RN  Outcome: Progressing     Problem: Skin/Tissue Integrity  Goal: Absence of new skin breakdown  Description: 1.  Monitor for areas of redness and/or skin breakdown  2.  Assess vascular access sites hourly  3.  Every 4-6 hours minimum:  Change oxygen saturation probe site  4.  Every 4-6 hours:  If on nasal continuous positive airway pressure, respiratory therapy assess nares and determine need for appliance change or resting period.  3/21/2024 1153 by Tammy Drummond RN  Outcome: Progressing  3/20/2024 2344 by Maxine Ulloa RN  Outcome: Progressing     Problem: Respiratory - Adult  Goal: Achieves optimal ventilation and oxygenation  3/21/2024 1153 by Tammy Drummond RN  Outcome: Progressing  3/20/2024 2344 by Maxine Ulloa RN  Outcome: Progressing  Flowsheets (Taken 3/20/2024 2024)  Achieves optimal ventilation and oxygenation: Assess for changes in respiratory status     Problem: Chronic Conditions and Co-morbidities  Goal: Patient's chronic conditions and co-morbidity symptoms are monitored and maintained or improved  3/21/2024 1153 by Tammy Drummond RN  Outcome: Progressing  3/20/2024 2344 by Maxine Ulloa RN  Outcome: Progressing     Problem: Nutrition Deficit:  Goal: Optimize nutritional status  3/21/2024 1153 by Tammy Drummond RN  Outcome: Progressing  3/20/2024 2344 by Maxine Ulloa RN  Outcome: Progressing

## 2024-03-21 NOTE — PLAN OF CARE
Problem: Safety - Adult  Goal: Free from fall injury  3/21/2024 1812 by Tammy Drummond RN  Outcome: Completed  3/21/2024 1153 by Tammy Drummond RN  Outcome: Progressing     Problem: Pain  Goal: Verbalizes/displays adequate comfort level or baseline comfort level  3/21/2024 1812 by Tammy Drummond RN  Outcome: Completed  3/21/2024 1153 by Tammy Drummond RN  Outcome: Progressing     Problem: Skin/Tissue Integrity  Goal: Absence of new skin breakdown  Description: 1.  Monitor for areas of redness and/or skin breakdown  2.  Assess vascular access sites hourly  3.  Every 4-6 hours minimum:  Change oxygen saturation probe site  4.  Every 4-6 hours:  If on nasal continuous positive airway pressure, respiratory therapy assess nares and determine need for appliance change or resting period.  3/21/2024 1812 by Tammy Drummond RN  Outcome: Completed  3/21/2024 1153 by Tmamy Drummond RN  Outcome: Progressing     Problem: Respiratory - Adult  Goal: Achieves optimal ventilation and oxygenation  3/21/2024 1812 by Tammy Drummond RN  Outcome: Completed  3/21/2024 1153 by Tammy Drummond RN  Outcome: Progressing     Problem: Chronic Conditions and Co-morbidities  Goal: Patient's chronic conditions and co-morbidity symptoms are monitored and maintained or improved  3/21/2024 1812 by Tammy Drummond RN  Outcome: Completed  3/21/2024 1153 by Tammy Drummond RN  Outcome: Progressing     Problem: Nutrition Deficit:  Goal: Optimize nutritional status  3/21/2024 1812 by Tammy Drummond RN  Outcome: Completed  3/21/2024 1153 by Tammy Drummond RN  Outcome: Progressing

## 2024-03-21 NOTE — PLAN OF CARE
Problem: Safety - Adult  Goal: Free from fall injury  3/20/2024 2344 by Maxine Ulloa RN  Outcome: Progressing  3/20/2024 1223 by Tammy Drummond RN  Outcome: Progressing     Problem: Pain  Goal: Verbalizes/displays adequate comfort level or baseline comfort level  3/20/2024 2344 by Maxine Ulloa RN  Outcome: Progressing  3/20/2024 1223 by Tammy Drummond RN  Outcome: Progressing     Problem: Skin/Tissue Integrity  Goal: Absence of new skin breakdown  Description: 1.  Monitor for areas of redness and/or skin breakdown  2.  Assess vascular access sites hourly  3.  Every 4-6 hours minimum:  Change oxygen saturation probe site  4.  Every 4-6 hours:  If on nasal continuous positive airway pressure, respiratory therapy assess nares and determine need for appliance change or resting period.  3/20/2024 2344 by Maxine Ulloa RN  Outcome: Progressing  3/20/2024 1223 by Tammy Drummond RN  Outcome: Progressing     Problem: Respiratory - Adult  Goal: Achieves optimal ventilation and oxygenation  3/20/2024 2344 by Maxine Ulloa RN  Outcome: Progressing  Flowsheets (Taken 3/20/2024 2024)  Achieves optimal ventilation and oxygenation: Assess for changes in respiratory status  3/20/2024 1951 by Ghazal Rowley RCP  Outcome: Progressing  Flowsheets (Taken 3/20/2024 1950)  Achieves optimal ventilation and oxygenation:   Respiratory therapy support as indicated   Assess and instruct to report shortness of breath or any respiratory difficulty   Encourage broncho-pulmonary hygiene including cough, deep breathe, incentive spirometry   Oxygen supplementation based on oxygen saturation or arterial blood gases   Assess for changes in respiratory status  3/20/2024 1223 by Tammy Drummond RN  Outcome: Progressing  3/20/2024 1043 by Viviana Flores, RT  Outcome: Progressing     Problem: Chronic Conditions and Co-morbidities  Goal: Patient's chronic

## 2024-03-22 NOTE — CARE COORDINATION
03/22/24 0900   Services At/After Discharge   Transition of Care Consult (CM Consult) Discharge Planning   Services At/After Discharge DME  (hospital bed: [facility] feeds: [facility])   Mode of Transport at Discharge  Van   Confirm Follow Up Transport Family   Condition of Participation: Discharge Planning   The Patient and/or Patient Representative was provided with a Choice of Provider? Patient Representative   Name of the Patient Representative who was provided with the Choice of Provider and agrees with the Discharge Plan?  AleksShavon (Spouse) [phone]   The Patient and/Or Patient Representative agree with the Discharge Plan? Yes     Pt has been approved for tube feeds through [facility]. [facility] met with Pt and wife at the bedside. CM received confirmation that Pt's hospital bed would be delivered on 3/22 by [facility]. Wife is to transport Pt in their personal wheelchair van. No further discharge needs indicated at this time. Pt is cleared from CM standpoint.     KATHY Rodriguez, ANJALI  [facility] Care Manager  [phone]

## 2024-03-29 ENCOUNTER — APPOINTMENT (OUTPATIENT)
Dept: VASCULAR SURGERY | Facility: HOSPITAL | Age: 69
DRG: 206 | End: 2024-03-29
Attending: INTERNAL MEDICINE
Payer: MEDICARE

## 2024-03-29 ENCOUNTER — APPOINTMENT (OUTPATIENT)
Facility: HOSPITAL | Age: 69
DRG: 206 | End: 2024-03-29
Payer: MEDICARE

## 2024-03-29 ENCOUNTER — HOSPITAL ENCOUNTER (INPATIENT)
Facility: HOSPITAL | Age: 69
LOS: 8 days | Discharge: HOSPICE | DRG: 206 | End: 2024-04-06
Attending: STUDENT IN AN ORGANIZED HEALTH CARE EDUCATION/TRAINING PROGRAM | Admitting: INTERNAL MEDICINE
Payer: MEDICARE

## 2024-03-29 DIAGNOSIS — R79.89 ELEVATED BRAIN NATRIURETIC PEPTIDE (BNP) LEVEL: ICD-10-CM

## 2024-03-29 DIAGNOSIS — R79.89 ELEVATED D-DIMER: ICD-10-CM

## 2024-03-29 DIAGNOSIS — J18.1 LUNG CONSOLIDATION (HCC): Primary | ICD-10-CM

## 2024-03-29 DIAGNOSIS — D64.9 ANEMIA, UNSPECIFIED TYPE: ICD-10-CM

## 2024-03-29 PROBLEM — E61.1 IRON DEFICIENCY: Status: ACTIVE | Noted: 2024-03-29

## 2024-03-29 PROBLEM — R41.82 ALTERED MENTAL STATUS: Status: RESOLVED | Noted: 2024-03-20 | Resolved: 2024-03-29

## 2024-03-29 PROBLEM — F32.A ANXIETY AND DEPRESSION: Status: ACTIVE | Noted: 2024-03-29

## 2024-03-29 PROBLEM — F41.9 ANXIETY AND DEPRESSION: Status: ACTIVE | Noted: 2024-03-29

## 2024-03-29 PROBLEM — J69.0 ASPIRATION PNEUMONIA (HCC): Status: RESOLVED | Noted: 2022-05-27 | Resolved: 2024-03-29

## 2024-03-29 PROBLEM — E10.65 TYPE 1 DIABETES MELLITUS WITH HYPERGLYCEMIA (HCC): Status: RESOLVED | Noted: 2024-03-20 | Resolved: 2024-03-29

## 2024-03-29 PROBLEM — N17.9 ARF (ACUTE RENAL FAILURE) (HCC): Status: RESOLVED | Noted: 2022-05-27 | Resolved: 2024-03-29

## 2024-03-29 PROBLEM — K31.84 GASTROPARESIS DUE TO DM (HCC): Status: ACTIVE | Noted: 2024-03-29

## 2024-03-29 PROBLEM — D72.829 LEUKOCYTOSIS: Status: RESOLVED | Noted: 2022-05-27 | Resolved: 2024-03-29

## 2024-03-29 PROBLEM — E11.43 GASTROPARESIS DUE TO DM (HCC): Status: ACTIVE | Noted: 2024-03-29

## 2024-03-29 PROBLEM — E88.09 HYPOALBUMINEMIA: Status: ACTIVE | Noted: 2024-03-29

## 2024-03-29 PROBLEM — J90 PLEURAL EFFUSION: Status: ACTIVE | Noted: 2024-03-29

## 2024-03-29 PROBLEM — L89.102 PRESSURE INJURY OF LOWER BACK, STAGE 2 (HCC): Status: RESOLVED | Noted: 2022-11-14 | Resolved: 2024-03-29

## 2024-03-29 PROBLEM — E03.9 HYPOTHYROID: Status: ACTIVE | Noted: 2024-03-29

## 2024-03-29 PROBLEM — I48.91 ATRIAL FIBRILLATION WITH RAPID VENTRICULAR RESPONSE (HCC): Status: ACTIVE | Noted: 2024-03-29

## 2024-03-29 PROBLEM — E78.5 HYPERLIPIDEMIA: Status: ACTIVE | Noted: 2024-03-29

## 2024-03-29 PROBLEM — R50.9 FEBRILE ILLNESS: Status: RESOLVED | Noted: 2024-03-15 | Resolved: 2024-03-29

## 2024-03-29 PROBLEM — R11.2 NAUSEA & VOMITING: Status: RESOLVED | Noted: 2022-05-27 | Resolved: 2024-03-29

## 2024-03-29 LAB
-: NORMAL
ALBUMIN SERPL-MCNC: 2.2 G/DL (ref 3.5–5)
ALBUMIN/GLOB SERPL: 0.5 (ref 1.1–2.2)
ALP SERPL-CCNC: 184 U/L (ref 45–117)
ALT SERPL-CCNC: 22 U/L (ref 12–78)
ANION GAP BLD CALC-SCNC: 4 (ref 10–20)
ANION GAP BLD CALC-SCNC: 5 (ref 10–20)
ANION GAP SERPL CALC-SCNC: 5 MMOL/L (ref 5–15)
APPEARANCE UR: CLEAR
AST SERPL-CCNC: 18 U/L (ref 15–37)
B PERT DNA SPEC QL NAA+PROBE: NOT DETECTED
BACTERIA URNS QL MICRO: NEGATIVE /HPF
BASE EXCESS BLD CALC-SCNC: 3.1 MMOL/L
BASE EXCESS BLD CALC-SCNC: 3.7 MMOL/L
BASOPHILS # BLD: 0 K/UL (ref 0–0.1)
BASOPHILS NFR BLD: 0 % (ref 0–1)
BILIRUB SERPL-MCNC: 0.3 MG/DL (ref 0.2–1)
BILIRUB UR QL: NEGATIVE
BORDETELLA PARAPERTUSSIS BY PCR: NOT DETECTED
BUN SERPL-MCNC: 37 MG/DL (ref 6–20)
BUN/CREAT SERPL: 36 (ref 12–20)
C PNEUM DNA SPEC QL NAA+PROBE: NOT DETECTED
CA-I BLD-MCNC: 1.01 MMOL/L (ref 1.12–1.32)
CA-I BLD-MCNC: 1.03 MMOL/L (ref 1.12–1.32)
CALCIUM SERPL-MCNC: 8.7 MG/DL (ref 8.5–10.1)
CHLORIDE BLD-SCNC: 104 MMOL/L (ref 100–108)
CHLORIDE BLD-SCNC: 105 MMOL/L (ref 100–108)
CHLORIDE SERPL-SCNC: 102 MMOL/L (ref 97–108)
CHOLEST SERPL-MCNC: 64 MG/DL
CO2 BLD-SCNC: 23 MMOL/L (ref 19–24)
CO2 BLD-SCNC: 23 MMOL/L (ref 19–24)
CO2 SERPL-SCNC: 28 MMOL/L (ref 21–32)
COLOR UR: ABNORMAL
COMMENT:: NORMAL
CREAT SERPL-MCNC: 1.02 MG/DL (ref 0.7–1.3)
CREAT UR-MCNC: 0.8 MG/DL (ref 0.6–1.3)
CREAT UR-MCNC: 0.8 MG/DL (ref 0.6–1.3)
D DIMER PPP FEU-MCNC: 1.78 MG/L FEU (ref 0–0.65)
DIFFERENTIAL METHOD BLD: ABNORMAL
EOSINOPHIL # BLD: 0.1 K/UL (ref 0–0.4)
EOSINOPHIL NFR BLD: 1 % (ref 0–7)
EPITH CASTS URNS QL MICRO: ABNORMAL /LPF
ERYTHROCYTE [DISTWIDTH] IN BLOOD BY AUTOMATED COUNT: 16.7 % (ref 11.5–14.5)
EST. AVERAGE GLUCOSE BLD GHB EST-MCNC: 186 MG/DL
FERRITIN SERPL-MCNC: 229 NG/ML (ref 26–388)
FLUAV SUBTYP SPEC NAA+PROBE: NOT DETECTED
FLUBV RNA SPEC QL NAA+PROBE: NOT DETECTED
FOLATE SERPL-MCNC: 44.1 NG/ML (ref 5–21)
GLOBULIN SER CALC-MCNC: 4.5 G/DL (ref 2–4)
GLUCOSE BLD STRIP.AUTO-MCNC: 138 MG/DL (ref 65–117)
GLUCOSE BLD STRIP.AUTO-MCNC: 149 MG/DL (ref 65–117)
GLUCOSE BLD STRIP.AUTO-MCNC: 175 MG/DL (ref 74–106)
GLUCOSE BLD STRIP.AUTO-MCNC: 176 MG/DL (ref 74–106)
GLUCOSE SERPL-MCNC: 218 MG/DL (ref 65–100)
GLUCOSE UR STRIP.AUTO-MCNC: NEGATIVE MG/DL
HADV DNA SPEC QL NAA+PROBE: NOT DETECTED
HAPTOGLOB SERPL-MCNC: 320 MG/DL (ref 30–200)
HAV IGM SER QL: NONREACTIVE
HBA1C MFR BLD: 8.1 % (ref 4–5.6)
HBV CORE IGM SER QL: NONREACTIVE
HBV SURFACE AG SER QL: <0.1 INDEX
HBV SURFACE AG SER QL: NEGATIVE
HCO3 BLDA-SCNC: 24 MMOL/L
HCO3 BLDA-SCNC: 24 MMOL/L
HCOV 229E RNA SPEC QL NAA+PROBE: NOT DETECTED
HCOV HKU1 RNA SPEC QL NAA+PROBE: NOT DETECTED
HCOV NL63 RNA SPEC QL NAA+PROBE: NOT DETECTED
HCOV OC43 RNA SPEC QL NAA+PROBE: NOT DETECTED
HCT VFR BLD AUTO: 23.2 % (ref 36.6–50.3)
HCV AB SER IA-ACNC: 0.06 INDEX
HCV AB SERPL QL IA: NONREACTIVE
HDLC SERPL-MCNC: 30 MG/DL
HDLC SERPL: 2.1 (ref 0–5)
HGB BLD-MCNC: 7.5 G/DL (ref 12.1–17)
HGB UR QL STRIP: NEGATIVE
HMPV RNA SPEC QL NAA+PROBE: NOT DETECTED
HPIV1 RNA SPEC QL NAA+PROBE: NOT DETECTED
HPIV2 RNA SPEC QL NAA+PROBE: NOT DETECTED
HPIV3 RNA SPEC QL NAA+PROBE: NOT DETECTED
HPIV4 RNA SPEC QL NAA+PROBE: NOT DETECTED
HYALINE CASTS URNS QL MICRO: ABNORMAL /LPF (ref 0–2)
IMM GRANULOCYTES # BLD AUTO: 0 K/UL (ref 0–0.04)
IMM GRANULOCYTES NFR BLD AUTO: 0 % (ref 0–0.5)
IRON SATN MFR SERPL: 7 % (ref 20–50)
IRON SERPL-MCNC: 14 UG/DL (ref 35–150)
KETONES UR QL STRIP.AUTO: NEGATIVE MG/DL
LACTATE BLD-SCNC: 1.13 MMOL/L (ref 0.4–2)
LACTATE BLD-SCNC: 1.31 MMOL/L (ref 0.4–2)
LDH SERPL L TO P-CCNC: 169 U/L (ref 85–241)
LDLC SERPL CALC-MCNC: 24.6 MG/DL (ref 0–100)
LEUKOCYTE ESTERASE UR QL STRIP.AUTO: NEGATIVE
LYMPHOCYTES # BLD: 1 K/UL (ref 0.8–3.5)
LYMPHOCYTES NFR BLD: 14 % (ref 12–49)
M PNEUMO DNA SPEC QL NAA+PROBE: NOT DETECTED
MAGNESIUM SERPL-MCNC: 2.4 MG/DL (ref 1.6–2.4)
MCH RBC QN AUTO: 27 PG (ref 26–34)
MCHC RBC AUTO-ENTMCNC: 32.3 G/DL (ref 30–36.5)
MCV RBC AUTO: 83.5 FL (ref 80–99)
MONOCYTES # BLD: 0.6 K/UL (ref 0–1)
MONOCYTES NFR BLD: 9 % (ref 5–13)
NEUTS SEG # BLD: 5.5 K/UL (ref 1.8–8)
NEUTS SEG NFR BLD: 76 % (ref 32–75)
NITRITE UR QL STRIP.AUTO: NEGATIVE
NRBC # BLD: 0 K/UL (ref 0–0.01)
NRBC BLD-RTO: 0 PER 100 WBC
NT PRO BNP: ABNORMAL PG/ML
PCO2 BLDV: 19.9 MMHG (ref 41–51)
PCO2 BLDV: 23.3 MMHG (ref 41–51)
PH BLDV: 7.63 (ref 7.32–7.42)
PH BLDV: 7.69 (ref 7.32–7.42)
PH UR STRIP: 8 (ref 5–8)
PHOSPHATE SERPL-MCNC: 3.3 MG/DL (ref 2.6–4.7)
PLATELET # BLD AUTO: 424 K/UL (ref 150–400)
PMV BLD AUTO: 11.8 FL (ref 8.9–12.9)
PO2 BLDV: 150 MMHG (ref 25–40)
PO2 BLDV: 182 MMHG (ref 25–40)
POTASSIUM BLD-SCNC: 5.8 MMOL/L (ref 3.5–5.5)
POTASSIUM BLD-SCNC: 5.9 MMOL/L (ref 3.5–5.5)
POTASSIUM SERPL-SCNC: 4.4 MMOL/L (ref 3.5–5.1)
PROCALCITONIN SERPL-MCNC: 0.12 NG/ML
PROT SERPL-MCNC: 6.7 G/DL (ref 6.4–8.2)
PROT UR STRIP-MCNC: 30 MG/DL
RBC # BLD AUTO: 2.78 M/UL (ref 4.1–5.7)
RBC #/AREA URNS HPF: ABNORMAL /HPF (ref 0–5)
RSV RNA SPEC QL NAA+PROBE: NOT DETECTED
RV+EV RNA SPEC QL NAA+PROBE: NOT DETECTED
SAO2 % BLD: 100 %
SAO2 % BLD: 100 %
SARS-COV-2 RNA RESP QL NAA+PROBE: NOT DETECTED
SERVICE CMNT-IMP: ABNORMAL
SODIUM BLD-SCNC: 132 MMOL/L (ref 136–145)
SODIUM BLD-SCNC: 132 MMOL/L (ref 136–145)
SODIUM SERPL-SCNC: 135 MMOL/L (ref 136–145)
SP GR UR REFRACTOMETRY: 1.01 (ref 1–1.03)
SPECIMEN HOLD: NORMAL
SPECIMEN SITE: ABNORMAL
SPECIMEN SITE: ABNORMAL
TIBC SERPL-MCNC: 211 UG/DL (ref 250–450)
TRIGL SERPL-MCNC: 47 MG/DL
TROPONIN I SERPL HS-MCNC: 31 NG/L (ref 0–76)
UROBILINOGEN UR QL STRIP.AUTO: 0.2 EU/DL (ref 0.2–1)
VIT B12 SERPL-MCNC: 1398 PG/ML (ref 193–986)
VLDLC SERPL CALC-MCNC: 9.4 MG/DL
WBC # BLD AUTO: 7.2 K/UL (ref 4.1–11.1)
WBC URNS QL MICRO: ABNORMAL /HPF (ref 0–4)

## 2024-03-29 PROCEDURE — 82803 BLOOD GASES ANY COMBINATION: CPT

## 2024-03-29 PROCEDURE — 6360000004 HC RX CONTRAST MEDICATION: Performed by: INTERNAL MEDICINE

## 2024-03-29 PROCEDURE — 0202U NFCT DS 22 TRGT SARS-COV-2: CPT

## 2024-03-29 PROCEDURE — 85025 COMPLETE CBC W/AUTO DIFF WBC: CPT

## 2024-03-29 PROCEDURE — 80074 ACUTE HEPATITIS PANEL: CPT

## 2024-03-29 PROCEDURE — 83036 HEMOGLOBIN GLYCOSYLATED A1C: CPT

## 2024-03-29 PROCEDURE — 80061 LIPID PANEL: CPT

## 2024-03-29 PROCEDURE — 93005 ELECTROCARDIOGRAM TRACING: CPT | Performed by: STUDENT IN AN ORGANIZED HEALTH CARE EDUCATION/TRAINING PROGRAM

## 2024-03-29 PROCEDURE — 84100 ASSAY OF PHOSPHORUS: CPT

## 2024-03-29 PROCEDURE — 2580000003 HC RX 258: Performed by: INTERNAL MEDICINE

## 2024-03-29 PROCEDURE — 1100000000 HC RM PRIVATE

## 2024-03-29 PROCEDURE — 82962 GLUCOSE BLOOD TEST: CPT

## 2024-03-29 PROCEDURE — 36415 COLL VENOUS BLD VENIPUNCTURE: CPT

## 2024-03-29 PROCEDURE — 83615 LACTATE (LD) (LDH) ENZYME: CPT

## 2024-03-29 PROCEDURE — 80053 COMPREHEN METABOLIC PANEL: CPT

## 2024-03-29 PROCEDURE — 81001 URINALYSIS AUTO W/SCOPE: CPT

## 2024-03-29 PROCEDURE — 83735 ASSAY OF MAGNESIUM: CPT

## 2024-03-29 PROCEDURE — 87086 URINE CULTURE/COLONY COUNT: CPT

## 2024-03-29 PROCEDURE — 84484 ASSAY OF TROPONIN QUANT: CPT

## 2024-03-29 PROCEDURE — 99285 EMERGENCY DEPT VISIT HI MDM: CPT

## 2024-03-29 PROCEDURE — 71046 X-RAY EXAM CHEST 2 VIEWS: CPT

## 2024-03-29 PROCEDURE — 84295 ASSAY OF SERUM SODIUM: CPT

## 2024-03-29 PROCEDURE — 82330 ASSAY OF CALCIUM: CPT

## 2024-03-29 PROCEDURE — 82607 VITAMIN B-12: CPT

## 2024-03-29 PROCEDURE — 83010 ASSAY OF HAPTOGLOBIN QUANT: CPT

## 2024-03-29 PROCEDURE — 84145 PROCALCITONIN (PCT): CPT

## 2024-03-29 PROCEDURE — 87040 BLOOD CULTURE FOR BACTERIA: CPT

## 2024-03-29 PROCEDURE — 6360000002 HC RX W HCPCS: Performed by: INTERNAL MEDICINE

## 2024-03-29 PROCEDURE — 6370000000 HC RX 637 (ALT 250 FOR IP): Performed by: INTERNAL MEDICINE

## 2024-03-29 PROCEDURE — 94640 AIRWAY INHALATION TREATMENT: CPT

## 2024-03-29 PROCEDURE — 82728 ASSAY OF FERRITIN: CPT

## 2024-03-29 PROCEDURE — 85379 FIBRIN DEGRADATION QUANT: CPT

## 2024-03-29 PROCEDURE — 71275 CT ANGIOGRAPHY CHEST: CPT

## 2024-03-29 PROCEDURE — 82947 ASSAY GLUCOSE BLOOD QUANT: CPT

## 2024-03-29 PROCEDURE — 83550 IRON BINDING TEST: CPT

## 2024-03-29 PROCEDURE — 84132 ASSAY OF SERUM POTASSIUM: CPT

## 2024-03-29 PROCEDURE — 83880 ASSAY OF NATRIURETIC PEPTIDE: CPT

## 2024-03-29 PROCEDURE — 83540 ASSAY OF IRON: CPT

## 2024-03-29 PROCEDURE — 82746 ASSAY OF FOLIC ACID SERUM: CPT

## 2024-03-29 RX ORDER — ACETAMINOPHEN 325 MG/1
650 TABLET ORAL EVERY 6 HOURS PRN
Status: DISCONTINUED | OUTPATIENT
Start: 2024-03-29 | End: 2024-03-29

## 2024-03-29 RX ORDER — TIZANIDINE 2 MG/1
4 TABLET ORAL EVERY 6 HOURS PRN
Status: DISCONTINUED | OUTPATIENT
Start: 2024-03-29 | End: 2024-03-29

## 2024-03-29 RX ORDER — ACETAMINOPHEN 650 MG/1
650 SUPPOSITORY RECTAL EVERY 6 HOURS PRN
Status: DISCONTINUED | OUTPATIENT
Start: 2024-03-29 | End: 2024-03-29

## 2024-03-29 RX ORDER — ONDANSETRON 4 MG/1
4 TABLET, ORALLY DISINTEGRATING ORAL EVERY 8 HOURS PRN
Status: DISCONTINUED | OUTPATIENT
Start: 2024-03-29 | End: 2024-04-06 | Stop reason: HOSPADM

## 2024-03-29 RX ORDER — INSULIN LISPRO 100 [IU]/ML
0-8 INJECTION, SOLUTION INTRAVENOUS; SUBCUTANEOUS
Status: DISCONTINUED | OUTPATIENT
Start: 2024-03-29 | End: 2024-04-06 | Stop reason: HOSPADM

## 2024-03-29 RX ORDER — SODIUM CHLORIDE 0.9 % (FLUSH) 0.9 %
5-40 SYRINGE (ML) INJECTION PRN
Status: DISCONTINUED | OUTPATIENT
Start: 2024-03-29 | End: 2024-04-06 | Stop reason: HOSPADM

## 2024-03-29 RX ORDER — ACETAMINOPHEN 160 MG/5ML
650 LIQUID ORAL EVERY 6 HOURS PRN
Status: DISCONTINUED | OUTPATIENT
Start: 2024-03-29 | End: 2024-04-06 | Stop reason: HOSPADM

## 2024-03-29 RX ORDER — LEVOTHYROXINE SODIUM 0.05 MG/1
75 TABLET ORAL DAILY
Status: DISCONTINUED | OUTPATIENT
Start: 2024-03-30 | End: 2024-03-29

## 2024-03-29 RX ORDER — ONDANSETRON 2 MG/ML
4 INJECTION INTRAMUSCULAR; INTRAVENOUS EVERY 6 HOURS PRN
Status: DISCONTINUED | OUTPATIENT
Start: 2024-03-29 | End: 2024-04-06 | Stop reason: HOSPADM

## 2024-03-29 RX ORDER — DANTROLENE SODIUM 25 MG/1
25 CAPSULE ORAL 3 TIMES DAILY
Status: DISCONTINUED | OUTPATIENT
Start: 2024-03-29 | End: 2024-04-06 | Stop reason: HOSPADM

## 2024-03-29 RX ORDER — INSULIN GLARGINE 100 [IU]/ML
14 INJECTION, SOLUTION SUBCUTANEOUS DAILY
Status: DISCONTINUED | OUTPATIENT
Start: 2024-03-30 | End: 2024-04-06 | Stop reason: HOSPADM

## 2024-03-29 RX ORDER — ALPRAZOLAM 0.5 MG/1
0.25 TABLET ORAL DAILY PRN
Status: DISCONTINUED | OUTPATIENT
Start: 2024-03-29 | End: 2024-04-06 | Stop reason: HOSPADM

## 2024-03-29 RX ORDER — DEXTROSE MONOHYDRATE 100 MG/ML
INJECTION, SOLUTION INTRAVENOUS CONTINUOUS PRN
Status: DISCONTINUED | OUTPATIENT
Start: 2024-03-29 | End: 2024-04-06 | Stop reason: HOSPADM

## 2024-03-29 RX ORDER — TIZANIDINE 2 MG/1
4 TABLET ORAL EVERY 6 HOURS PRN
Status: DISCONTINUED | OUTPATIENT
Start: 2024-03-29 | End: 2024-04-06 | Stop reason: HOSPADM

## 2024-03-29 RX ORDER — BUDESONIDE 0.5 MG/2ML
0.5 INHALANT ORAL
Status: DISCONTINUED | OUTPATIENT
Start: 2024-03-29 | End: 2024-04-06 | Stop reason: HOSPADM

## 2024-03-29 RX ORDER — SODIUM CHLORIDE 9 MG/ML
INJECTION, SOLUTION INTRAVENOUS PRN
Status: DISCONTINUED | OUTPATIENT
Start: 2024-03-29 | End: 2024-04-06 | Stop reason: HOSPADM

## 2024-03-29 RX ORDER — INSULIN LISPRO 100 [IU]/ML
0-4 INJECTION, SOLUTION INTRAVENOUS; SUBCUTANEOUS NIGHTLY
Status: DISCONTINUED | OUTPATIENT
Start: 2024-03-29 | End: 2024-04-06 | Stop reason: HOSPADM

## 2024-03-29 RX ORDER — MIDODRINE HYDROCHLORIDE 5 MG/1
2.5 TABLET ORAL 2 TIMES DAILY
Status: DISCONTINUED | OUTPATIENT
Start: 2024-03-29 | End: 2024-04-06 | Stop reason: HOSPADM

## 2024-03-29 RX ORDER — RISPERIDONE 1 MG/ML
0.5 SOLUTION ORAL NIGHTLY
Status: DISCONTINUED | OUTPATIENT
Start: 2024-03-29 | End: 2024-04-06 | Stop reason: HOSPADM

## 2024-03-29 RX ORDER — IPRATROPIUM BROMIDE AND ALBUTEROL SULFATE 2.5; .5 MG/3ML; MG/3ML
1 SOLUTION RESPIRATORY (INHALATION) EVERY 6 HOURS PRN
Status: DISCONTINUED | OUTPATIENT
Start: 2024-03-29 | End: 2024-04-06 | Stop reason: HOSPADM

## 2024-03-29 RX ORDER — ACETAMINOPHEN 650 MG/1
650 SUPPOSITORY RECTAL EVERY 6 HOURS PRN
Status: DISCONTINUED | OUTPATIENT
Start: 2024-03-29 | End: 2024-04-06 | Stop reason: HOSPADM

## 2024-03-29 RX ORDER — SODIUM CHLORIDE 0.9 % (FLUSH) 0.9 %
5-40 SYRINGE (ML) INJECTION EVERY 12 HOURS SCHEDULED
Status: DISCONTINUED | OUTPATIENT
Start: 2024-03-29 | End: 2024-04-06 | Stop reason: HOSPADM

## 2024-03-29 RX ORDER — ACETYLCYSTEINE 200 MG/ML
600 SOLUTION ORAL; RESPIRATORY (INHALATION)
Status: DISCONTINUED | OUTPATIENT
Start: 2024-03-29 | End: 2024-04-06 | Stop reason: HOSPADM

## 2024-03-29 RX ORDER — CARVEDILOL 12.5 MG/1
12.5 TABLET ORAL 2 TIMES DAILY
Status: DISCONTINUED | OUTPATIENT
Start: 2024-03-29 | End: 2024-04-03

## 2024-03-29 RX ORDER — ARFORMOTEROL TARTRATE 15 UG/2ML
15 SOLUTION RESPIRATORY (INHALATION)
Status: DISCONTINUED | OUTPATIENT
Start: 2024-03-29 | End: 2024-04-06 | Stop reason: HOSPADM

## 2024-03-29 RX ORDER — ASPIRIN 81 MG/1
81 TABLET, CHEWABLE ORAL DAILY
Status: DISCONTINUED | OUTPATIENT
Start: 2024-03-30 | End: 2024-04-06 | Stop reason: HOSPADM

## 2024-03-29 RX ORDER — FAMOTIDINE 20 MG/1
40 TABLET, FILM COATED ORAL DAILY
Status: DISCONTINUED | OUTPATIENT
Start: 2024-03-30 | End: 2024-04-06 | Stop reason: HOSPADM

## 2024-03-29 RX ORDER — LEVOTHYROXINE SODIUM 0.07 MG/1
75 TABLET ORAL DAILY
Status: DISCONTINUED | OUTPATIENT
Start: 2024-03-30 | End: 2024-04-06 | Stop reason: HOSPADM

## 2024-03-29 RX ORDER — ENOXAPARIN SODIUM 100 MG/ML
40 INJECTION SUBCUTANEOUS DAILY
Status: DISCONTINUED | OUTPATIENT
Start: 2024-03-30 | End: 2024-04-06 | Stop reason: HOSPADM

## 2024-03-29 RX ORDER — FOLIC ACID 1 MG/1
1 TABLET ORAL DAILY
Status: DISCONTINUED | OUTPATIENT
Start: 2024-03-30 | End: 2024-04-06 | Stop reason: HOSPADM

## 2024-03-29 RX ORDER — POLYETHYLENE GLYCOL 3350 17 G/17G
17 POWDER, FOR SOLUTION ORAL DAILY PRN
Status: DISCONTINUED | OUTPATIENT
Start: 2024-03-29 | End: 2024-04-06 | Stop reason: HOSPADM

## 2024-03-29 RX ADMIN — IRON SUCROSE 100 MG: 20 INJECTION, SOLUTION INTRAVENOUS at 16:04

## 2024-03-29 RX ADMIN — TRAZODONE HYDROCHLORIDE 150 MG: 100 TABLET ORAL at 21:39

## 2024-03-29 RX ADMIN — ARFORMOTEROL TARTRATE 15 MCG: 15 SOLUTION RESPIRATORY (INHALATION) at 21:06

## 2024-03-29 RX ADMIN — DANTROLENE SODIUM 25 MG: 25 CAPSULE ORAL at 21:38

## 2024-03-29 RX ADMIN — BUDESONIDE 500 MCG: 0.5 INHALANT RESPIRATORY (INHALATION) at 21:06

## 2024-03-29 RX ADMIN — SODIUM CHLORIDE, PRESERVATIVE FREE 10 ML: 5 INJECTION INTRAVENOUS at 21:41

## 2024-03-29 RX ADMIN — IOPAMIDOL 80 ML: 755 INJECTION, SOLUTION INTRAVENOUS at 16:53

## 2024-03-29 RX ADMIN — CARVEDILOL 12.5 MG: 12.5 TABLET, FILM COATED ORAL at 21:39

## 2024-03-29 RX ADMIN — DILTIAZEM HYDROCHLORIDE 60 MG: 30 TABLET ORAL at 21:39

## 2024-03-29 RX ADMIN — RISPERIDONE 0.5 MG: 1 SOLUTION ORAL at 21:39

## 2024-03-29 ASSESSMENT — PAIN - FUNCTIONAL ASSESSMENT: PAIN_FUNCTIONAL_ASSESSMENT: 0-10

## 2024-03-29 ASSESSMENT — PAIN SCALES - GENERAL: PAINLEVEL_OUTOF10: 0

## 2024-03-29 NOTE — ED NOTES
TRANSFER - OUT REPORT:    Verbal report given to Tammy on Hayder [place] Fink  being transferred to [facility] for routine progression of patient care       Report consisted of patient's Situation, Background, Assessment and   Recommendations(SBAR).     Information from the following report(s) Nurse Handoff Report and ED Encounter Summary was reviewed with the receiving nurse.    Chandni Fall Assessment:    Presents to emergency department  because of falls (Syncope, seizure, or loss of consciousness): No  Age > 70: No  Altered Mental Status, Intoxication with alcohol or substance confusion (Disorientation, impaired judgment, poor safety awaremess, or inability to follow instructions): No  Impaired Mobility: Ambulates or transfers with assistive devices or assistance; Unable to ambulate or transer.: Yes  Nursing Judgement: No          Lines:   Peripheral IV 03/29/24 Right;Dorsal Forearm (Active)        Opportunity for questions and clarification was provided.      Patient transported with:  Tech

## 2024-03-29 NOTE — CONSULTS
[facility]     Name: Hayder Fink MRN: [id number]   : 1955 Hospital: [facility]   Date: 3/29/2024        Impression Plan   Multilobar collapse on left  Poor airway protection/weak cough  Dysphagia  Anemia  Hx of CVA  Hx of aspiration               Chest PT  Acetylcysteine nebs  Albuterol nebs  Will need a bronchoscopy early next week if unable to clear his left mainstem which I suspect he won't be able to do.   Will see as needed over the weekend  Pt would benefit from a percussion vest as an outpt to help with secretions.   I discussed risks with bronchoscopy with the wife, including sedation risks and airway protection risks. She understands that the pt is more sensitive to both of these factors. Pt is DNR and I informed her that DNR can temporarily be reversed for procedure only. Wife understand that mucus plugging will likely reoccur.          Radiology  ( personally reviewed) CT chest: complete collpase of EFRAÍN and LLL, small left pleural effusion   ABG Invalid input(s): \"PHI\", \"PO2I\", \"PCO2I\"       Subjective     Cc: AMS    67 yo with PMHx of a-fib, COPD, hemorrhagic CVA in  and recurrent aspiration PNA presenting with agitation. CXR read as left pleural effusion. CT chest shows multilobar collapse on the left due to mucus plug in the left mainstem. Wife reports that pt is unable to mount an quality cough and that sputum often seems to get hung up in his throat. He takes all feeds through a PEG tube. Pt is currently on RA and appears comfortable, however wife reports that he has seemed more agitated lately and she feels this may be due to the mucus plug.     Review of Systems:  Review of systems not obtained due to patient factors.    Past Medical History:   Diagnosis Date    AF (paroxysmal atrial fibrillation) (HCC)     Anxiety and depression     COPD (chronic obstructive pulmonary disease) (HCC)     DM type 2 causing vascular disease (HCC)     Dysphagia

## 2024-03-29 NOTE — ED TRIAGE NOTES
Pt arrives via EMS reporting pt was diagnosed with pneumonia here two weeks ago.  Wife reports he does not seem to be getting better.  Reports lethargy, generally not feeling well, concern for fluid retention.

## 2024-03-29 NOTE — H&P
[facility]    Hospitalist Admission Note                                                                                                                                  NAME:  Hayder Fink   :   1955   MRN:  [id number]     PCP:  Fermin Slade MD     Date/Time of service:  3/29/2024 2:45 PM  To assist coordination of care and communication with nursing and staff, this note may be preliminary early in the day, but finalized by end of the day.        Subjective:     CHIEF COMPLAINT: lethargy     HISTORY OF PRESENT ILLNESS:     Mr. Fink is a 68 y.o. male who presented to the Emergency Department complaining of sundowning.  Discharged 2 weeks ago after admit for presumed PNA, large left lung effusion.  Has not improved at home, and 5 days ago starting having worsening agitation, particularly at night.  PCP Rx Xanax, but that has not helped.  Hx of prior CVA with severe residual effects, PEG feedings, wounds.  We will admit him for management.    Past Medical History:   Diagnosis Date    AF (paroxysmal atrial fibrillation) ([facility])     Anxiety and depression     COPD (chronic obstructive pulmonary disease) ([facility])     DM type 2 causing vascular disease (HCC)     Dysphagia as late effect of stroke     Gastroparesis due to DM (HCC)     Hx of completed stroke     Hyperlipidemia     Hypertension     Hypothyroid     PEG (percutaneous endoscopic gastrostomy) status (HCC)     Rheumatoid arthritis (HCC)         Past Surgical History:   Procedure Laterality Date    AMPUTATION Left     left 5 toes    HERNIA REPAIR         Social History     Tobacco Use    Smoking status: Former    Smokeless tobacco: Never   Substance Use Topics    Alcohol use: Never        No family history on file.     Allergies   Allergen Reactions    Amoxicillin Diarrhea    Amoxicillin-Pot Clavulanate Diarrhea        Prior to Admission medications    Medication Sig Start Date End Date Taking? Authorizing Provider  Authorization (EUA) only.     Fact sheet for Patients: https://www.fda.gov/media/989575/download  Fact sheet for Healthcare Providers: https://www.fda.fov/media/869861/download         Blood Culture 1 [[id number]] Collected: 03/15/24 1850    Order Status: Completed Specimen: Blood Updated: 03/21/24 0600     Special Requests NO SPECIAL REQUESTS        Culture NO GROWTH 6 DAYS               I have reviewed previous records       Assessment and Plan:      Pleural effusion, left - POA, worsening over weeks despite diuretics.  No improved with antibiotics, and no infection proven.  ECHO years ago normal.  Unclear etiology.  Cancer vs empyema vs chylous vs other.   Pulmonary consult.  Not septic.  Not hypoxic.    DM type 2 causing vascular disease - Diabetic diet and counseling.  SSI per protocol.  Continue home Lantus. Check A1c.    Hx of completed stroke / Left hemiparesis / Hyperlipidemia - POA and likely stable. Continue flexeril, dantrolene, ASA and atorvastatin. Hold Norco    Dysphagia as late effect of stroke / Gastroparesis due to DM / PEG (percutaneous endoscopic gastrostomy) status / Hypoalbuminemia - POA, continue PEG feedings and get nutrition consult.  Continue pepcid.    Edema - Wife notes generally worse edema of legs, testicles and abdomen.  Suspect hypoalbuminemia, vs CHF vs DVT.  Assess as above.    Paroxysmal atrial fibrillation / Hypotension - POA, monitor tele.  Continue coreg and diltiazem for rate control and midodrine for pressure support.  Not on anticoagulation.      Chronic obstructive pulmonary disease - Stable. Continue brovana, pulmicort and prn duonebs    Iron deficiency Anemia - POA, moderate and stable.  Iron deficiency on recent serologies.  Give IV iron.  Monitor.    Anxiety and depression - Continue sertraline, trazodone and prn xanax    Pressure injury of sacral region, stage 2 - Wound consult.      Hypothyroid - Continue synthroid and check TSH    Dehydration - POA, mild, hold lasix.

## 2024-03-29 NOTE — ED PROVIDER NOTES
you?: Not on file     How often does anyone, including family and friends, threaten you with harm?: Not on file   Utilities: Patient Unable To Answer (3/20/2024)    [facility] Utilities     Threatened with loss of utilities: Patient unable to answer         PHYSICAL EXAM  (up to 7 for level 4, 8 or more for level 5)     Body mass index is 22.24 kg/m².    Physical Exam  Vitals and nursing note reviewed.   Constitutional:       General: He is not in acute distress.     Appearance: Normal appearance. He is not ill-appearing.   HENT:      Head: Normocephalic.      Nose: Congestion and rhinorrhea present.      Mouth/Throat:      Mouth: Mucous membranes are moist.      Pharynx: No posterior oropharyngeal erythema.   Eyes:      General:         Right eye: Discharge present.      Extraocular Movements: Extraocular movements intact.      Conjunctiva/sclera: Conjunctivae normal.   Cardiovascular:      Rate and Rhythm: Normal rate and regular rhythm.      Pulses: Normal pulses.      Heart sounds: Normal heart sounds.   Pulmonary:      Effort: Pulmonary effort is normal. Tachypnea present.      Breath sounds: Decreased air movement present. Examination of the left-upper field reveals rhonchi. Examination of the right-middle field reveals rhonchi. Examination of the left-middle field reveals decreased breath sounds. Examination of the left-lower field reveals decreased breath sounds. Decreased breath sounds and rhonchi present.      Comments: Bilateral friction rubs.  Abdominal:      General: There is no distension.      Palpations: Abdomen is soft.      Tenderness: There is no abdominal tenderness. There is no guarding.   Musculoskeletal:         General: Normal range of motion.      Cervical back: Normal range of motion and neck supple.      Right lower leg: No edema.      Left lower leg: No edema.   Lymphadenopathy:      Cervical: No cervical adenopathy.   Skin:     General: Skin is warm and dry.          Neurological:      General:  Physician / Physician Assistant / Nurse Practitioner     Emili Gonzales, APRN - NP  03/30/24 2303

## 2024-03-30 ENCOUNTER — APPOINTMENT (OUTPATIENT)
Dept: VASCULAR SURGERY | Facility: HOSPITAL | Age: 69
DRG: 206 | End: 2024-03-30
Attending: INTERNAL MEDICINE
Payer: MEDICARE

## 2024-03-30 ENCOUNTER — APPOINTMENT (OUTPATIENT)
Facility: HOSPITAL | Age: 69
DRG: 206 | End: 2024-03-30
Attending: INTERNAL MEDICINE
Payer: MEDICARE

## 2024-03-30 LAB
ALBUMIN SERPL-MCNC: 1.9 G/DL (ref 3.5–5)
ALBUMIN/GLOB SERPL: 0.4 (ref 1.1–2.2)
ALP SERPL-CCNC: 146 U/L (ref 45–117)
ALT SERPL-CCNC: 17 U/L (ref 12–78)
ANION GAP SERPL CALC-SCNC: 4 MMOL/L (ref 5–15)
AST SERPL-CCNC: 12 U/L (ref 15–37)
BACTERIA SPEC CULT: NORMAL
BILIRUB SERPL-MCNC: 0.4 MG/DL (ref 0.2–1)
BUN SERPL-MCNC: 36 MG/DL (ref 6–20)
BUN/CREAT SERPL: 39 (ref 12–20)
CALCIUM SERPL-MCNC: 9 MG/DL (ref 8.5–10.1)
CHLORIDE SERPL-SCNC: 104 MMOL/L (ref 97–108)
CO2 SERPL-SCNC: 27 MMOL/L (ref 21–32)
CREAT SERPL-MCNC: 0.93 MG/DL (ref 0.7–1.3)
ECHO AO ASC DIAM: 3.1 CM
ECHO AO ASCENDING AORTA INDEX: 1.66 CM/M2
ECHO AO ROOT DIAM: 3.6 CM
ECHO AO ROOT INDEX: 1.93 CM/M2
ECHO AV AREA PEAK VELOCITY: 2.5 CM2
ECHO AV AREA PEAK VELOCITY: 2.8 CM2
ECHO AV AREA VTI: 2.4 CM2
ECHO AV AREA/BSA VTI: 1.3 CM2/M2
ECHO AV MEAN GRADIENT: 3 MMHG
ECHO AV MEAN VELOCITY: 0.9 M/S
ECHO AV PEAK GRADIENT: 5 MMHG
ECHO AV PEAK GRADIENT: 6 MMHG
ECHO AV PEAK VELOCITY: 1.1 M/S
ECHO AV PEAK VELOCITY: 1.2 M/S
ECHO AV VTI: 21.2 CM
ECHO BSA: 1.86 M2
ECHO EST RA PRESSURE: 5 MMHG
ECHO LA DIAMETER INDEX: 1.98 CM/M2
ECHO LA DIAMETER: 3.7 CM
ECHO LA TO AORTIC ROOT RATIO: 1.03
ECHO LA VOL A-L A2C: 50 ML (ref 18–58)
ECHO LA VOL A-L A4C: 68 ML (ref 18–58)
ECHO LA VOL BP: 55 ML (ref 18–58)
ECHO LA VOL MOD A2C: 47 ML (ref 18–58)
ECHO LA VOL MOD A4C: 56 ML (ref 18–58)
ECHO LA VOL/BSA BIPLANE: 29 ML/M2 (ref 16–34)
ECHO LA VOLUME AREA LENGTH: 63 ML
ECHO LA VOLUME INDEX A-L A2C: 27 ML/M2 (ref 16–34)
ECHO LA VOLUME INDEX A-L A4C: 36 ML/M2 (ref 16–34)
ECHO LA VOLUME INDEX AREA LENGTH: 34 ML/M2 (ref 16–34)
ECHO LA VOLUME INDEX MOD A2C: 25 ML/M2 (ref 16–34)
ECHO LA VOLUME INDEX MOD A4C: 30 ML/M2 (ref 16–34)
ECHO LV E' LATERAL VELOCITY: 7 CM/S
ECHO LV E' SEPTAL VELOCITY: 5 CM/S
ECHO LV EDV A2C: 47 ML
ECHO LV EDV A4C: 44 ML
ECHO LV EDV BP: 48 ML (ref 67–155)
ECHO LV EDV INDEX A4C: 24 ML/M2
ECHO LV EDV INDEX BP: 26 ML/M2
ECHO LV EDV NDEX A2C: 25 ML/M2
ECHO LV EJECTION FRACTION A2C: 64 %
ECHO LV EJECTION FRACTION A4C: 63 %
ECHO LV ESV A2C: 17 ML
ECHO LV ESV A4C: 16 ML
ECHO LV ESV BP: 17 ML (ref 22–58)
ECHO LV ESV INDEX A2C: 9 ML/M2
ECHO LV ESV INDEX A4C: 9 ML/M2
ECHO LV ESV INDEX BP: 9 ML/M2
ECHO LV FRACTIONAL SHORTENING: 31 % (ref 28–44)
ECHO LV INTERNAL DIMENSION DIASTOLE INDEX: 1.93 CM/M2
ECHO LV INTERNAL DIMENSION DIASTOLIC: 3.6 CM (ref 4.2–5.9)
ECHO LV INTERNAL DIMENSION SYSTOLIC INDEX: 1.34 CM/M2
ECHO LV INTERNAL DIMENSION SYSTOLIC: 2.5 CM
ECHO LV IVSD: 1 CM (ref 0.6–1)
ECHO LV MASS 2D: 107.9 G (ref 88–224)
ECHO LV MASS INDEX 2D: 57.7 G/M2 (ref 49–115)
ECHO LV POSTERIOR WALL DIASTOLIC: 1 CM (ref 0.6–1)
ECHO LV RELATIVE WALL THICKNESS RATIO: 0.56
ECHO LVOT AREA: 2.8 CM2
ECHO LVOT AV VTI INDEX: 0.81
ECHO LVOT DIAM: 1.9 CM
ECHO LVOT MEAN GRADIENT: 2 MMHG
ECHO LVOT PEAK GRADIENT: 4 MMHG
ECHO LVOT PEAK VELOCITY: 1.1 M/S
ECHO LVOT STROKE VOLUME INDEX: 26.1 ML/M2
ECHO LVOT SV: 48.7 ML
ECHO LVOT VTI: 17.2 CM
ECHO MV A VELOCITY: 0.43 M/S
ECHO MV E DECELERATION TIME (DT): 183.9 MS
ECHO MV E VELOCITY: 0.92 M/S
ECHO MV E/A RATIO: 2.14
ECHO MV E/E' LATERAL: 13.14
ECHO MV E/E' RATIO (AVERAGED): 15.77
ECHO MV REGURGITANT PEAK GRADIENT: 104 MMHG
ECHO MV REGURGITANT PEAK VELOCITY: 5.1 M/S
ECHO PV MAX VELOCITY: 1 M/S
ECHO PV PEAK GRADIENT: 4 MMHG
ECHO RIGHT VENTRICULAR SYSTOLIC PRESSURE (RVSP): 41 MMHG
ECHO RV FREE WALL PEAK S': 9 CM/S
ECHO RV INTERNAL DIMENSION: 3.8 CM
ECHO RV TAPSE: 1.3 CM (ref 1.7–?)
ECHO TV REGURGITANT MAX VELOCITY: 2.99 M/S
ECHO TV REGURGITANT PEAK GRADIENT: 36 MMHG
EKG ATRIAL RATE: 202 BPM
EKG DIAGNOSIS: NORMAL
EKG Q-T INTERVAL: 430 MS
EKG QRS DURATION: 70 MS
EKG QTC CALCULATION (BAZETT): 480 MS
EKG R AXIS: 40 DEGREES
EKG T AXIS: 60 DEGREES
EKG VENTRICULAR RATE: 75 BPM
ERYTHROCYTE [DISTWIDTH] IN BLOOD BY AUTOMATED COUNT: 17.1 % (ref 11.5–14.5)
GLOBULIN SER CALC-MCNC: 4.3 G/DL (ref 2–4)
GLUCOSE BLD STRIP.AUTO-MCNC: 140 MG/DL (ref 65–117)
GLUCOSE BLD STRIP.AUTO-MCNC: 175 MG/DL (ref 65–117)
GLUCOSE BLD STRIP.AUTO-MCNC: 199 MG/DL (ref 65–117)
GLUCOSE BLD STRIP.AUTO-MCNC: 276 MG/DL (ref 65–117)
GLUCOSE BLD STRIP.AUTO-MCNC: 349 MG/DL (ref 65–117)
GLUCOSE SERPL-MCNC: 186 MG/DL (ref 65–100)
HCT VFR BLD AUTO: 21.2 % (ref 36.6–50.3)
HGB BLD-MCNC: 6.8 G/DL (ref 12.1–17)
MAGNESIUM SERPL-MCNC: 2.4 MG/DL (ref 1.6–2.4)
MCH RBC QN AUTO: 26.4 PG (ref 26–34)
MCHC RBC AUTO-ENTMCNC: 32.1 G/DL (ref 30–36.5)
MCV RBC AUTO: 82.2 FL (ref 80–99)
NRBC # BLD: 0 K/UL (ref 0–0.01)
NRBC BLD-RTO: 0 PER 100 WBC
PHOSPHATE SERPL-MCNC: 3.1 MG/DL (ref 2.6–4.7)
PLATELET # BLD AUTO: 431 K/UL (ref 150–400)
PMV BLD AUTO: 11.9 FL (ref 8.9–12.9)
POTASSIUM SERPL-SCNC: 4.2 MMOL/L (ref 3.5–5.1)
PROT SERPL-MCNC: 6.2 G/DL (ref 6.4–8.2)
RBC # BLD AUTO: 2.58 M/UL (ref 4.1–5.7)
SERVICE CMNT-IMP: ABNORMAL
SERVICE CMNT-IMP: NORMAL
SODIUM SERPL-SCNC: 135 MMOL/L (ref 136–145)
WBC # BLD AUTO: 7.2 K/UL (ref 4.1–11.1)

## 2024-03-30 PROCEDURE — 6360000002 HC RX W HCPCS: Performed by: INTERNAL MEDICINE

## 2024-03-30 PROCEDURE — 94761 N-INVAS EAR/PLS OXIMETRY MLT: CPT

## 2024-03-30 PROCEDURE — 1100000000 HC RM PRIVATE

## 2024-03-30 PROCEDURE — 6370000000 HC RX 637 (ALT 250 FOR IP): Performed by: INTERNAL MEDICINE

## 2024-03-30 PROCEDURE — 36415 COLL VENOUS BLD VENIPUNCTURE: CPT

## 2024-03-30 PROCEDURE — 93306 TTE W/DOPPLER COMPLETE: CPT

## 2024-03-30 PROCEDURE — 83735 ASSAY OF MAGNESIUM: CPT

## 2024-03-30 PROCEDURE — 93306 TTE W/DOPPLER COMPLETE: CPT | Performed by: SPECIALIST

## 2024-03-30 PROCEDURE — 93010 ELECTROCARDIOGRAM REPORT: CPT | Performed by: INTERNAL MEDICINE

## 2024-03-30 PROCEDURE — 85027 COMPLETE CBC AUTOMATED: CPT

## 2024-03-30 PROCEDURE — 82962 GLUCOSE BLOOD TEST: CPT

## 2024-03-30 PROCEDURE — 80053 COMPREHEN METABOLIC PANEL: CPT

## 2024-03-30 PROCEDURE — 84100 ASSAY OF PHOSPHORUS: CPT

## 2024-03-30 PROCEDURE — 2580000003 HC RX 258: Performed by: INTERNAL MEDICINE

## 2024-03-30 PROCEDURE — 94640 AIRWAY INHALATION TREATMENT: CPT

## 2024-03-30 RX ADMIN — RISPERIDONE 0.5 MG: 1 SOLUTION ORAL at 20:45

## 2024-03-30 RX ADMIN — IRON SUCROSE 100 MG: 20 INJECTION, SOLUTION INTRAVENOUS at 12:34

## 2024-03-30 RX ADMIN — SERTRALINE HYDROCHLORIDE 200 MG: 50 TABLET ORAL at 10:03

## 2024-03-30 RX ADMIN — DANTROLENE SODIUM 25 MG: 25 CAPSULE ORAL at 15:35

## 2024-03-30 RX ADMIN — ASPIRIN 81 MG: 81 TABLET, CHEWABLE ORAL at 10:04

## 2024-03-30 RX ADMIN — INSULIN LISPRO 4 UNITS: 100 INJECTION, SOLUTION INTRAVENOUS; SUBCUTANEOUS at 10:04

## 2024-03-30 RX ADMIN — TRAZODONE HYDROCHLORIDE 150 MG: 100 TABLET ORAL at 20:41

## 2024-03-30 RX ADMIN — ENOXAPARIN SODIUM 40 MG: 100 INJECTION SUBCUTANEOUS at 10:04

## 2024-03-30 RX ADMIN — ACETYLCYSTEINE 600 MG: 200 INHALANT RESPIRATORY (INHALATION) at 07:35

## 2024-03-30 RX ADMIN — DANTROLENE SODIUM 25 MG: 25 CAPSULE ORAL at 10:03

## 2024-03-30 RX ADMIN — FOLIC ACID 1 MG: 1 TABLET ORAL at 10:03

## 2024-03-30 RX ADMIN — CARVEDILOL 12.5 MG: 12.5 TABLET, FILM COATED ORAL at 20:46

## 2024-03-30 RX ADMIN — CARVEDILOL 12.5 MG: 12.5 TABLET, FILM COATED ORAL at 10:04

## 2024-03-30 RX ADMIN — BUDESONIDE 500 MCG: 0.5 INHALANT RESPIRATORY (INHALATION) at 07:30

## 2024-03-30 RX ADMIN — IPRATROPIUM BROMIDE AND ALBUTEROL SULFATE 1 DOSE: .5; 3 SOLUTION RESPIRATORY (INHALATION) at 07:35

## 2024-03-30 RX ADMIN — BUDESONIDE 500 MCG: 0.5 INHALANT RESPIRATORY (INHALATION) at 21:27

## 2024-03-30 RX ADMIN — DILTIAZEM HYDROCHLORIDE 60 MG: 30 TABLET ORAL at 20:42

## 2024-03-30 RX ADMIN — DANTROLENE SODIUM 25 MG: 25 CAPSULE ORAL at 20:41

## 2024-03-30 RX ADMIN — INSULIN GLARGINE 14 UNITS: 100 INJECTION, SOLUTION SUBCUTANEOUS at 10:04

## 2024-03-30 RX ADMIN — FAMOTIDINE 40 MG: 20 TABLET, FILM COATED ORAL at 10:03

## 2024-03-30 RX ADMIN — SODIUM CHLORIDE, PRESERVATIVE FREE 20 ML: 5 INJECTION INTRAVENOUS at 12:34

## 2024-03-30 RX ADMIN — DILTIAZEM HYDROCHLORIDE 60 MG: 30 TABLET ORAL at 10:03

## 2024-03-30 RX ADMIN — INSULIN LISPRO 6 UNITS: 100 INJECTION, SOLUTION INTRAVENOUS; SUBCUTANEOUS at 12:33

## 2024-03-30 RX ADMIN — MIDODRINE HYDROCHLORIDE 2.5 MG: 5 TABLET ORAL at 10:03

## 2024-03-30 RX ADMIN — LEVOTHYROXINE SODIUM 75 MCG: 0.07 TABLET ORAL at 06:49

## 2024-03-30 RX ADMIN — ARFORMOTEROL TARTRATE 15 MCG: 15 SOLUTION RESPIRATORY (INHALATION) at 07:30

## 2024-03-30 RX ADMIN — ARFORMOTEROL TARTRATE 15 MCG: 15 SOLUTION RESPIRATORY (INHALATION) at 21:27

## 2024-03-30 NOTE — PROGRESS NOTES
[facility]    Hospitalist Progress Note    NAME: Hayder Fink   :  1955  MRM:  [id number]    Date/Time of service 3/30/2024  7:42 AM    To assist coordination of care and communication with nursing and staff, this note may be preliminary early in the day, but finalized by end of the day.        Assessment and Plan:     Multilobar collapse on left / Pleural effusion, left - POA, worsening over weeks despite diuretics.  No improved with antibiotics, and no infection proven.  ECHO years ago normal.  Mucous plug suspected. DDX Cancer vs empyema vs chylous vs other. Pulmonary consulted and plan for bronchoscopy for plug removal.  Not septic, low procalcitonin.  Not hypoxic.     Agitation and sundowning / Anxiety and depression - Continue sertraline, trazodone and prn xanax.  Added low dose nightly risperidone.      Iron deficiency Anemia - POA, moderate and a bit worse today.  Iron deficiency on serologies.  Give IV iron.  Monitor.    DM type 2 causing vascular disease - Diabetic diet and counseling.  SSI per protocol.  Continue home Lantus. A1c 8.1     Hx of completed stroke / Left hemiparesis / Hyperlipidemia - POA and likely stable. Continue flexeril, dantrolene, ASA. LDL 24. I do not think he benefits from atorvastatin. Hold Norco     Dysphagia as late effect of stroke / Gastroparesis due to DM / PEG (percutaneous endoscopic gastrostomy) status / Hypoalbuminemia - POA, continue PEG feedings and get nutrition consult.  Continue pepcid.     Edema / Hypoalbuminemia - Wife notes generally worse edema of legs, testicles and abdomen.   Assess as above.     Paroxysmal atrial fibrillation / Hypotension - POA, monitor tele. Continue coreg and diltiazem for rate control and midodrine for pressure support.  Not on anticoagulation.      Chronic obstructive pulmonary disease - Stable. Continue brovana, pulmicort and prn duonebs     Pressure injury of sacral region, stage 2 - Wound consult.

## 2024-03-30 NOTE — PROGRESS NOTES
PT Note- Orders received and chart reviewed. Patient has been bed bound with inability to mobilize BLEs and UE contracted. He has been bed bound, dependent x 2 for bed mob.x 2 years and has mechanical lift at home. PT services unable to be of benefit to this unfortunate 69 yo patient. Completed orders.    Lashawn Mckee, PT, DPT

## 2024-03-30 NOTE — CONSENT
Informed Consent for Blood Component Transfusion Note    I have discussed with the wife the rationale for blood component transfusion; its benefits in treating or preventing fatigue, organ damage, or death; and its risk which includes mild transfusion reactions, rare risk of blood borne infection, or more serious but rare reactions. I have discussed the alternatives to transfusion, including the risk and consequences of not receiving transfusion. The wife had an opportunity to ask questions and had agreed to proceed with transfusion of blood components.    Electronically signed by Ramirez Barrios MD on 3/30/24 at 10:27 AM EDT

## 2024-03-31 ENCOUNTER — APPOINTMENT (OUTPATIENT)
Dept: VASCULAR SURGERY | Facility: HOSPITAL | Age: 69
DRG: 206 | End: 2024-03-31
Attending: INTERNAL MEDICINE
Payer: MEDICARE

## 2024-03-31 LAB
ANION GAP SERPL CALC-SCNC: 3 MMOL/L (ref 5–15)
BASOPHILS # BLD: 0 K/UL (ref 0–0.1)
BASOPHILS NFR BLD: 0 % (ref 0–1)
BUN SERPL-MCNC: 41 MG/DL (ref 6–20)
BUN/CREAT SERPL: 38 (ref 12–20)
CALCIUM SERPL-MCNC: 8.9 MG/DL (ref 8.5–10.1)
CHLORIDE SERPL-SCNC: 104 MMOL/L (ref 97–108)
CO2 SERPL-SCNC: 27 MMOL/L (ref 21–32)
CREAT SERPL-MCNC: 1.09 MG/DL (ref 0.7–1.3)
DIFFERENTIAL METHOD BLD: ABNORMAL
EOSINOPHIL # BLD: 0.1 K/UL (ref 0–0.4)
EOSINOPHIL NFR BLD: 2 % (ref 0–7)
ERYTHROCYTE [DISTWIDTH] IN BLOOD BY AUTOMATED COUNT: 16.8 % (ref 11.5–14.5)
GLUCOSE BLD STRIP.AUTO-MCNC: 106 MG/DL (ref 65–117)
GLUCOSE BLD STRIP.AUTO-MCNC: 110 MG/DL (ref 65–117)
GLUCOSE BLD STRIP.AUTO-MCNC: 113 MG/DL (ref 65–117)
GLUCOSE BLD STRIP.AUTO-MCNC: 118 MG/DL (ref 65–117)
GLUCOSE BLD STRIP.AUTO-MCNC: 193 MG/DL (ref 65–117)
GLUCOSE BLD STRIP.AUTO-MCNC: 223 MG/DL (ref 65–117)
GLUCOSE BLD STRIP.AUTO-MCNC: 66 MG/DL (ref 65–117)
GLUCOSE SERPL-MCNC: 93 MG/DL (ref 65–100)
HCT VFR BLD AUTO: 21.6 % (ref 36.6–50.3)
HGB BLD-MCNC: 6.9 G/DL (ref 12.1–17)
IMM GRANULOCYTES # BLD AUTO: 0 K/UL (ref 0–0.04)
IMM GRANULOCYTES NFR BLD AUTO: 0 % (ref 0–0.5)
LYMPHOCYTES # BLD: 1.1 K/UL (ref 0.8–3.5)
LYMPHOCYTES NFR BLD: 15 % (ref 12–49)
MCH RBC QN AUTO: 26.3 PG (ref 26–34)
MCHC RBC AUTO-ENTMCNC: 31.9 G/DL (ref 30–36.5)
MCV RBC AUTO: 82.4 FL (ref 80–99)
MONOCYTES # BLD: 0.7 K/UL (ref 0–1)
MONOCYTES NFR BLD: 10 % (ref 5–13)
NEUTS SEG # BLD: 5.2 K/UL (ref 1.8–8)
NEUTS SEG NFR BLD: 73 % (ref 32–75)
NRBC # BLD: 0 K/UL (ref 0–0.01)
NRBC BLD-RTO: 0 PER 100 WBC
PLATELET # BLD AUTO: 416 K/UL (ref 150–400)
PMV BLD AUTO: 11.5 FL (ref 8.9–12.9)
POTASSIUM SERPL-SCNC: 4.4 MMOL/L (ref 3.5–5.1)
RBC # BLD AUTO: 2.62 M/UL (ref 4.1–5.7)
RBC MORPH BLD: ABNORMAL
RBC MORPH BLD: ABNORMAL
SERVICE CMNT-IMP: ABNORMAL
SERVICE CMNT-IMP: NORMAL
SODIUM SERPL-SCNC: 134 MMOL/L (ref 136–145)
WBC # BLD AUTO: 7.1 K/UL (ref 4.1–11.1)

## 2024-03-31 PROCEDURE — 2580000003 HC RX 258: Performed by: INTERNAL MEDICINE

## 2024-03-31 PROCEDURE — 36415 COLL VENOUS BLD VENIPUNCTURE: CPT

## 2024-03-31 PROCEDURE — 85025 COMPLETE CBC W/AUTO DIFF WBC: CPT

## 2024-03-31 PROCEDURE — 6370000000 HC RX 637 (ALT 250 FOR IP): Performed by: INTERNAL MEDICINE

## 2024-03-31 PROCEDURE — 80048 BASIC METABOLIC PNL TOTAL CA: CPT

## 2024-03-31 PROCEDURE — 6360000002 HC RX W HCPCS: Performed by: INTERNAL MEDICINE

## 2024-03-31 PROCEDURE — 93970 EXTREMITY STUDY: CPT

## 2024-03-31 PROCEDURE — 82962 GLUCOSE BLOOD TEST: CPT

## 2024-03-31 PROCEDURE — 1100000000 HC RM PRIVATE

## 2024-03-31 PROCEDURE — 94640 AIRWAY INHALATION TREATMENT: CPT

## 2024-03-31 PROCEDURE — 94761 N-INVAS EAR/PLS OXIMETRY MLT: CPT

## 2024-03-31 RX ADMIN — DEXTROSE MONOHYDRATE 125 ML: 100 INJECTION, SOLUTION INTRAVENOUS at 04:42

## 2024-03-31 RX ADMIN — DILTIAZEM HYDROCHLORIDE 60 MG: 30 TABLET ORAL at 10:45

## 2024-03-31 RX ADMIN — DANTROLENE SODIUM 25 MG: 25 CAPSULE ORAL at 10:44

## 2024-03-31 RX ADMIN — SERTRALINE HYDROCHLORIDE 200 MG: 50 TABLET ORAL at 10:44

## 2024-03-31 RX ADMIN — CARVEDILOL 12.5 MG: 12.5 TABLET, FILM COATED ORAL at 10:44

## 2024-03-31 RX ADMIN — ARFORMOTEROL TARTRATE 15 MCG: 15 SOLUTION RESPIRATORY (INHALATION) at 20:25

## 2024-03-31 RX ADMIN — FAMOTIDINE 40 MG: 20 TABLET, FILM COATED ORAL at 10:44

## 2024-03-31 RX ADMIN — TRAZODONE HYDROCHLORIDE 150 MG: 100 TABLET ORAL at 22:50

## 2024-03-31 RX ADMIN — BUDESONIDE 500 MCG: 0.5 INHALANT RESPIRATORY (INHALATION) at 20:25

## 2024-03-31 RX ADMIN — LEVOTHYROXINE SODIUM 75 MCG: 0.07 TABLET ORAL at 07:01

## 2024-03-31 RX ADMIN — FOLIC ACID 1 MG: 1 TABLET ORAL at 10:44

## 2024-03-31 RX ADMIN — DANTROLENE SODIUM 25 MG: 25 CAPSULE ORAL at 15:45

## 2024-03-31 RX ADMIN — DILTIAZEM HYDROCHLORIDE 60 MG: 30 TABLET ORAL at 22:50

## 2024-03-31 RX ADMIN — SODIUM CHLORIDE, PRESERVATIVE FREE 10 ML: 5 INJECTION INTRAVENOUS at 22:52

## 2024-03-31 RX ADMIN — ASPIRIN 81 MG: 81 TABLET, CHEWABLE ORAL at 10:44

## 2024-03-31 RX ADMIN — INSULIN GLARGINE 14 UNITS: 100 INJECTION, SOLUTION SUBCUTANEOUS at 10:45

## 2024-03-31 RX ADMIN — ARFORMOTEROL TARTRATE 15 MCG: 15 SOLUTION RESPIRATORY (INHALATION) at 07:47

## 2024-03-31 RX ADMIN — BUDESONIDE 500 MCG: 0.5 INHALANT RESPIRATORY (INHALATION) at 07:47

## 2024-03-31 RX ADMIN — CARVEDILOL 12.5 MG: 12.5 TABLET, FILM COATED ORAL at 22:50

## 2024-03-31 NOTE — PROGRESS NOTES
[facility]    Hospitalist Progress Note    NAME: Hayder Fink   :  1955  MRM:  [id number]    Date/Time of service 3/31/2024  8:32 AM    To assist coordination of care and communication with nursing and staff, this note may be preliminary early in the day, but finalized by end of the day.        Assessment and Plan:     Multilobar collapse on left / Pleural effusion, left - POA, worsening over weeks despite diuretics.  No improved with antibiotics, and no infection proven.  Mucous plug suspected. DDX Cancer vs empyema vs chylous vs other. Pulmonary consulted and plan for bronchoscopy for plug removal.  Not septic, low procalcitonin.  Not hypoxic.     Agitation and sundowning / Anxiety and depression - Continue sertraline, trazodone and prn xanax.  Added low dose nightly risperidone.      Iron deficiency Anemia - POA, moderate and stable today.  Iron deficiency on serologies.  Give IV iron.  Monitor.    DM type 2 causing vascular disease - Diabetic diet and counseling.  SSI per protocol.  Continue home Lantus. A1c 8.1     Hx of completed stroke / Left hemiparesis / Hyperlipidemia - POA and stable. Continue flexeril, dantrolene, ASA. LDL 24. I do not think he benefits from atorvastatin. Hold Norco     Dysphagia as late effect of stroke / Gastroparesis due to DM / PEG (percutaneous endoscopic gastrostomy) status / Hypoalbuminemia - POA, continue PEG feedings and get nutrition consult.  Continue pepcid.     Edema / Hypoalbuminemia - Wife notes generally worse edema of legs, testicles and abdomen.   Assess as above.     Paroxysmal atrial fibrillation / Hypotension - POA, monitor tele. Continue coreg and diltiazem for rate control and midodrine for pressure support.  Not on anticoagulation.      Chronic obstructive pulmonary disease - Stable. Continue brovana, pulmicort and prn duonebs     Pressure injury of sacral region, stage 2 - Wound consult.       Hypothyroid - Continue

## 2024-03-31 NOTE — PROGRESS NOTES
3/31/24 at 1542 sent message to MD following up on the patient and his hbg of 6.9.  [facility] and that [facility] was getting iron.  I do not have a report for iron and he has a procedure in the am.  3/31/24 at 1609  stated that No transfusion he is going to have procedure tomorrow

## 2024-03-31 NOTE — CONSULTS
[facility]     Name: Hayder Fink MRN: [id number]   : 1955 Hospital: [facility]   Date: 3/31/2024        Impression Plan   Multilobar collapse on left  Poor airway protection/weak cough  Dysphagia  Anemia  Hx of CVA  Hx of aspiration               Chest PT  Acetylcysteine nebs  Albuterol nebs  Will need a bronchoscopy early next week if unable to clear his left mainstem which I suspect he won't be able to do. Bronchoscopy will likely be made easier if pt is intubated during the procedure for airway protection  Pt would benefit from a percussion vest as an outpt to help with secretions.   I discussed risks with bronchoscopy with the wife, including sedation risks and airway protection risks. She understands that the pt is more sensitive to both of these factors. Pt is DNR and I informed her that DNR can temporarily be reversed for procedure only. Wife understand that mucus plugging will likely reoccur.     Addendum: Pt will tentatively have bronch done at 11 am tomorrow with Dr. Carranza. I called wife and updated her and let her know that procedure was likely going to be done under general anesthesia. I answered all her questions.          Radiology  ( personally reviewed) CT chest: complete collpase of EFRAÍN and LLL, small left pleural effusion   ABG Invalid input(s): \"PHI\", \"PO2I\", \"PCO2I\"       Subjective     Cc: AMS    67 yo with PMHx of a-fib, COPD, hemorrhagic CVA in  and recurrent aspiration PNA presenting with agitation. CXR read as left pleural effusion. CT chest shows multilobar collapse on the left due to mucus plug in the left mainstem. Wife reports that pt is unable to mount an quality cough and that sputum often seems to get hung up in his throat. He takes all feeds through a PEG tube. Pt is currently on RA and appears comfortable, however wife reports that he has seemed more agitated lately and she feels this may be due to the mucus plug.     3/31:  No  03/31/24  0503   WBC 7.2 7.2 7.1   HGB 7.5* 6.8* 6.9*   HCT 23.2* 21.2* 21.6*   * 431* 416*       Recent Labs     03/29/24  1317 03/29/24  1554 03/30/24  0059 03/31/24  0503   *  --  135* 134*   K 4.4  --  4.2 4.4     --  104 104   CO2 28  --  27 27   BUN 37*  --  36* 41*   MG  --  2.4 2.4  --    PHOS  --  3.3 3.1  --    ALT 22  --  17  --          Objective:     Mode Rate Tidal Volume Pressure FiO2 PEEP                    Vital Signs:     TMAX(24)      Intake/Output:   Last shift:         Last 3 shifts: No intake/output data recorded.[email or web address]  Intake/Output Summary (Last 24 hours) at 3/31/2024 0854  Last data filed at 3/31/2024 0427  Gross per 24 hour   Intake 1000 ml   Output 650 ml   Net 350 ml     EXAM:   GENERAL: awake, alert, on RA, non-verbal HEENT:  PERRL, EOMI, no alar flaring or epistaxis, oral mucosa moist without cyanosis, NECK:  no jugular vein distention, no retractions, no thyromegaly or masses, LUNGS: no breathsounds on the left, clear on the right, HEART:  Regular rate and rhythm with no MGR; no edema is present, ABDOMEN: PEG,  soft with no tenderness, bowel sounds present, EXTREMITIES:  warm with no cyanosis, SKIN:  no jaundice or ecchymosis, and NEUROLOGIC:  alert and oriented, grossly non-focal    Adriana Sandhu MD  [facility]

## 2024-03-31 NOTE — PROGRESS NOTES
Brief Nutrition Assessment    Type and Reason for Visit: Brief Assessment    Nutrition Recommendations/Plan:   Modified TF to home regimen:   Osmolite 1.5 (or equivalent) @ 60 mL x 16 hours   (Goal volume 960 mL/day, ex: 6 am - 10 pm)   mL q 4 hours     Nutrition Assessment:  Chart reviewed by weekend on-call dietitian. MST for home EN/TPN - patient familiar to services. Corrected TF order for home formula/rate. Full assessment to follow within 1 - 3 days per RD policy.     Diet Order:  Diet NPO  DIET ONE TIME MESSAGE;  ADULT TUBE FEEDING; PEG; Standard without Fiber; Cyclic; 60; 6:00 AM; 10:00 PM; 120; Q 4 hours      Nutr. Labs:  Hemoglobin A1C   Date Value Ref Range Status   03/29/2024 8.1 (H) 4.0 - 5.6 % Final     Comment:     (NOTE)  HbA1C Interpretive Ranges  <5.7              Normal  5.7 - 6.4         Consider Prediabetes  >6.5              Consider Diabetes         Lab Results   Component Value Date/Time    POCGLU 118 03/31/2024 07:04 AM    POCGLU 113 03/31/2024 05:01 AM    POCGLU 66 03/31/2024 04:32 AM    POCGLU 106 03/31/2024 01:21 AM    POCGLU 140 03/30/2024 09:26 PM    POCGLU 175 03/30/2024 06:06 PM       Electronically signed by Karla Agudelo RDMS  Contact: [phone] or via [employer]

## 2024-03-31 NOTE — PROGRESS NOTES
Occupational Therapy Note  3/31/2024    OT eval order received and acknowledged. Screen completed as pt is currently at baseline dependent functional status for self care and functional transfers/mobility. Per chart review, pt has been bed bound and dependent for all bed mobility x2 years (has mechanical lift at home) and pt stating requiring assist for all self care. Patient noted with limited christie UE A/PROM in all limbs. Patient with no acute OT from OT services at this time.    Thank you,  Carrie Monzon OTR/L

## 2024-04-01 ENCOUNTER — APPOINTMENT (OUTPATIENT)
Facility: HOSPITAL | Age: 69
DRG: 206 | End: 2024-04-01
Payer: MEDICARE

## 2024-04-01 LAB
COMMENT:: NORMAL
ECHO BSA: 1.86 M2
GLUCOSE BLD STRIP.AUTO-MCNC: 126 MG/DL (ref 65–117)
GLUCOSE BLD STRIP.AUTO-MCNC: 180 MG/DL (ref 65–117)
GLUCOSE BLD STRIP.AUTO-MCNC: 205 MG/DL (ref 65–117)
GLUCOSE BLD STRIP.AUTO-MCNC: 256 MG/DL (ref 65–117)
HISTORY CHECK: NORMAL
SERVICE CMNT-IMP: ABNORMAL
SPECIMEN HOLD: NORMAL

## 2024-04-01 PROCEDURE — 6360000002 HC RX W HCPCS: Performed by: FAMILY MEDICINE

## 2024-04-01 PROCEDURE — 86901 BLOOD TYPING SEROLOGIC RH(D): CPT

## 2024-04-01 PROCEDURE — 71045 X-RAY EXAM CHEST 1 VIEW: CPT

## 2024-04-01 PROCEDURE — 7100000010 HC PHASE II RECOVERY - FIRST 15 MIN: Performed by: INTERNAL MEDICINE

## 2024-04-01 PROCEDURE — 87185 SC STD ENZYME DETCJ PER NZM: CPT

## 2024-04-01 PROCEDURE — 87147 CULTURE TYPE IMMUNOLOGIC: CPT

## 2024-04-01 PROCEDURE — 3600007512: Performed by: INTERNAL MEDICINE

## 2024-04-01 PROCEDURE — 3600007502: Performed by: INTERNAL MEDICINE

## 2024-04-01 PROCEDURE — 2709999900 HC NON-CHARGEABLE SUPPLY: Performed by: INTERNAL MEDICINE

## 2024-04-01 PROCEDURE — 0BD18ZX EXTRACTION OF TRACHEA, VIA NATURAL OR ARTIFICIAL OPENING ENDOSCOPIC, DIAGNOSTIC: ICD-10-PCS | Performed by: INTERNAL MEDICINE

## 2024-04-01 PROCEDURE — 94761 N-INVAS EAR/PLS OXIMETRY MLT: CPT

## 2024-04-01 PROCEDURE — 87070 CULTURE OTHR SPECIMN AEROBIC: CPT

## 2024-04-01 PROCEDURE — 94640 AIRWAY INHALATION TREATMENT: CPT

## 2024-04-01 PROCEDURE — 99152 MOD SED SAME PHYS/QHP 5/>YRS: CPT | Performed by: INTERNAL MEDICINE

## 2024-04-01 PROCEDURE — 36415 COLL VENOUS BLD VENIPUNCTURE: CPT

## 2024-04-01 PROCEDURE — 6370000000 HC RX 637 (ALT 250 FOR IP): Performed by: INTERNAL MEDICINE

## 2024-04-01 PROCEDURE — 7100000011 HC PHASE II RECOVERY - ADDTL 15 MIN: Performed by: INTERNAL MEDICINE

## 2024-04-01 PROCEDURE — 2580000003 HC RX 258: Performed by: INTERNAL MEDICINE

## 2024-04-01 PROCEDURE — 6370000000 HC RX 637 (ALT 250 FOR IP)

## 2024-04-01 PROCEDURE — 1100000000 HC RM PRIVATE

## 2024-04-01 PROCEDURE — 6360000002 HC RX W HCPCS: Performed by: INTERNAL MEDICINE

## 2024-04-01 PROCEDURE — 86923 COMPATIBILITY TEST ELECTRIC: CPT

## 2024-04-01 PROCEDURE — 6360000002 HC RX W HCPCS

## 2024-04-01 PROCEDURE — 86900 BLOOD TYPING SEROLOGIC ABO: CPT

## 2024-04-01 PROCEDURE — 88112 CYTOPATH CELL ENHANCE TECH: CPT

## 2024-04-01 PROCEDURE — 6370000000 HC RX 637 (ALT 250 FOR IP): Performed by: FAMILY MEDICINE

## 2024-04-01 PROCEDURE — 82962 GLUCOSE BLOOD TEST: CPT

## 2024-04-01 PROCEDURE — 87205 SMEAR GRAM STAIN: CPT

## 2024-04-01 PROCEDURE — 86850 RBC ANTIBODY SCREEN: CPT

## 2024-04-01 PROCEDURE — 87186 SC STD MICRODIL/AGAR DIL: CPT

## 2024-04-01 PROCEDURE — P9016 RBC LEUKOCYTES REDUCED: HCPCS

## 2024-04-01 PROCEDURE — 87077 CULTURE AEROBIC IDENTIFY: CPT

## 2024-04-01 PROCEDURE — 99153 MOD SED SAME PHYS/QHP EA: CPT | Performed by: INTERNAL MEDICINE

## 2024-04-01 RX ORDER — SODIUM CHLORIDE 9 MG/ML
INJECTION, SOLUTION INTRAVENOUS PRN
Status: DISCONTINUED | OUTPATIENT
Start: 2024-04-01 | End: 2024-04-06 | Stop reason: HOSPADM

## 2024-04-01 RX ORDER — SODIUM CHLORIDE 0.9 % (FLUSH) 0.9 %
5-40 SYRINGE (ML) INJECTION PRN
Status: DISCONTINUED | OUTPATIENT
Start: 2024-04-01 | End: 2024-04-01 | Stop reason: HOSPADM

## 2024-04-01 RX ORDER — FUROSEMIDE 10 MG/ML
40 INJECTION INTRAMUSCULAR; INTRAVENOUS ONCE
Status: COMPLETED | OUTPATIENT
Start: 2024-04-01 | End: 2024-04-01

## 2024-04-01 RX ORDER — LIDOCAINE HYDROCHLORIDE 40 MG/ML
SOLUTION TOPICAL
Status: COMPLETED
Start: 2024-04-01 | End: 2024-04-01

## 2024-04-01 RX ORDER — FENTANYL CITRATE 50 UG/ML
INJECTION, SOLUTION INTRAMUSCULAR; INTRAVENOUS
Status: COMPLETED
Start: 2024-04-01 | End: 2024-04-01

## 2024-04-01 RX ORDER — SODIUM CHLORIDE 0.9 % (FLUSH) 0.9 %
5-40 SYRINGE (ML) INJECTION EVERY 12 HOURS SCHEDULED
Status: DISCONTINUED | OUTPATIENT
Start: 2024-04-01 | End: 2024-04-01 | Stop reason: HOSPADM

## 2024-04-01 RX ORDER — MIDAZOLAM HYDROCHLORIDE 1 MG/ML
5 INJECTION, SOLUTION INTRAMUSCULAR; INTRAVENOUS ONCE
Status: COMPLETED | OUTPATIENT
Start: 2024-04-01 | End: 2024-04-01

## 2024-04-01 RX ORDER — FENTANYL CITRATE 50 UG/ML
100 INJECTION, SOLUTION INTRAMUSCULAR; INTRAVENOUS ONCE
Status: COMPLETED | OUTPATIENT
Start: 2024-04-01 | End: 2024-04-01

## 2024-04-01 RX ORDER — LIDOCAINE HYDROCHLORIDE 20 MG/ML
2 INJECTION, SOLUTION INFILTRATION; PERINEURAL ONCE
Status: DISCONTINUED | OUTPATIENT
Start: 2024-04-01 | End: 2024-04-06 | Stop reason: HOSPADM

## 2024-04-01 RX ORDER — SODIUM CHLORIDE 0.9 % (FLUSH) 0.9 %
5-40 SYRINGE (ML) INJECTION EVERY 12 HOURS SCHEDULED
Status: DISCONTINUED | OUTPATIENT
Start: 2024-04-01 | End: 2024-04-06 | Stop reason: HOSPADM

## 2024-04-01 RX ORDER — MIDAZOLAM HYDROCHLORIDE 1 MG/ML
INJECTION INTRAMUSCULAR; INTRAVENOUS
Status: COMPLETED
Start: 2024-04-01 | End: 2024-04-01

## 2024-04-01 RX ORDER — LIDOCAINE HYDROCHLORIDE 20 MG/ML
INJECTION, SOLUTION INFILTRATION; PERINEURAL
Status: DISCONTINUED
Start: 2024-04-01 | End: 2024-04-01 | Stop reason: WASHOUT

## 2024-04-01 RX ORDER — SODIUM CHLORIDE 9 MG/ML
INJECTION, SOLUTION INTRAVENOUS PRN
Status: DISCONTINUED | OUTPATIENT
Start: 2024-04-01 | End: 2024-04-01 | Stop reason: HOSPADM

## 2024-04-01 RX ORDER — FUROSEMIDE 10 MG/ML
40 INJECTION INTRAMUSCULAR; INTRAVENOUS ONCE
Status: COMPLETED | OUTPATIENT
Start: 2024-04-02 | End: 2024-04-02

## 2024-04-01 RX ORDER — INSULIN GLARGINE 100 [IU]/ML
7 INJECTION, SOLUTION SUBCUTANEOUS ONCE
Status: COMPLETED | OUTPATIENT
Start: 2024-04-01 | End: 2024-04-01

## 2024-04-01 RX ORDER — SODIUM CHLORIDE 0.9 % (FLUSH) 0.9 %
5-40 SYRINGE (ML) INJECTION PRN
Status: DISCONTINUED | OUTPATIENT
Start: 2024-04-01 | End: 2024-04-06 | Stop reason: HOSPADM

## 2024-04-01 RX ADMIN — ARFORMOTEROL TARTRATE 15 MCG: 15 SOLUTION RESPIRATORY (INHALATION) at 07:57

## 2024-04-01 RX ADMIN — SODIUM CHLORIDE, PRESERVATIVE FREE 10 ML: 5 INJECTION INTRAVENOUS at 22:00

## 2024-04-01 RX ADMIN — ACETYLCYSTEINE 600 MG: 200 INHALANT RESPIRATORY (INHALATION) at 07:57

## 2024-04-01 RX ADMIN — ARFORMOTEROL TARTRATE 15 MCG: 15 SOLUTION RESPIRATORY (INHALATION) at 23:57

## 2024-04-01 RX ADMIN — FOLIC ACID 1 MG: 1 TABLET ORAL at 08:53

## 2024-04-01 RX ADMIN — MIDAZOLAM 2 MG: 1 INJECTION INTRAMUSCULAR; INTRAVENOUS at 10:59

## 2024-04-01 RX ADMIN — CARVEDILOL 12.5 MG: 12.5 TABLET, FILM COATED ORAL at 21:59

## 2024-04-01 RX ADMIN — BUDESONIDE 500 MCG: 0.5 INHALANT RESPIRATORY (INHALATION) at 23:57

## 2024-04-01 RX ADMIN — FENTANYL CITRATE 50 MCG: 50 INJECTION, SOLUTION INTRAMUSCULAR; INTRAVENOUS at 11:01

## 2024-04-01 RX ADMIN — CARVEDILOL 12.5 MG: 12.5 TABLET, FILM COATED ORAL at 08:53

## 2024-04-01 RX ADMIN — FENTANYL CITRATE 50 MCG: 50 INJECTION INTRAMUSCULAR; INTRAVENOUS at 11:01

## 2024-04-01 RX ADMIN — DANTROLENE SODIUM 25 MG: 25 CAPSULE ORAL at 00:13

## 2024-04-01 RX ADMIN — FUROSEMIDE 40 MG: 10 INJECTION, SOLUTION INTRAMUSCULAR; INTRAVENOUS at 18:12

## 2024-04-01 RX ADMIN — DILTIAZEM HYDROCHLORIDE 60 MG: 30 TABLET ORAL at 21:58

## 2024-04-01 RX ADMIN — INSULIN LISPRO 2 UNITS: 100 INJECTION, SOLUTION INTRAVENOUS; SUBCUTANEOUS at 16:45

## 2024-04-01 RX ADMIN — SERTRALINE HYDROCHLORIDE 200 MG: 50 TABLET ORAL at 08:55

## 2024-04-01 RX ADMIN — FAMOTIDINE 40 MG: 20 TABLET, FILM COATED ORAL at 08:52

## 2024-04-01 RX ADMIN — RISPERIDONE 0.5 MG: 1 SOLUTION ORAL at 22:00

## 2024-04-01 RX ADMIN — BUDESONIDE 500 MCG: 0.5 INHALANT RESPIRATORY (INHALATION) at 07:57

## 2024-04-01 RX ADMIN — LIDOCAINE HYDROCHLORIDE: 40 SOLUTION ORAL at 10:29

## 2024-04-01 RX ADMIN — ACETYLCYSTEINE 600 MG: 200 INHALANT RESPIRATORY (INHALATION) at 23:49

## 2024-04-01 RX ADMIN — RISPERIDONE 0.5 MG: 1 SOLUTION ORAL at 00:13

## 2024-04-01 RX ADMIN — IPRATROPIUM BROMIDE AND ALBUTEROL SULFATE 1 DOSE: .5; 3 SOLUTION RESPIRATORY (INHALATION) at 23:49

## 2024-04-01 RX ADMIN — IPRATROPIUM BROMIDE AND ALBUTEROL SULFATE 1 DOSE: .5; 3 SOLUTION RESPIRATORY (INHALATION) at 07:57

## 2024-04-01 RX ADMIN — DANTROLENE SODIUM 25 MG: 25 CAPSULE ORAL at 08:51

## 2024-04-01 RX ADMIN — MIDAZOLAM HYDROCHLORIDE 2 MG: 1 INJECTION, SOLUTION INTRAMUSCULAR; INTRAVENOUS at 10:59

## 2024-04-01 RX ADMIN — DILTIAZEM HYDROCHLORIDE 60 MG: 30 TABLET ORAL at 08:51

## 2024-04-01 RX ADMIN — DANTROLENE SODIUM 25 MG: 25 CAPSULE ORAL at 13:28

## 2024-04-01 RX ADMIN — INSULIN GLARGINE 7 UNITS: 100 INJECTION, SOLUTION SUBCUTANEOUS at 18:13

## 2024-04-01 RX ADMIN — TRAZODONE HYDROCHLORIDE 150 MG: 100 TABLET ORAL at 21:57

## 2024-04-01 RX ADMIN — MIDODRINE HYDROCHLORIDE 2.5 MG: 5 TABLET ORAL at 21:58

## 2024-04-01 RX ADMIN — DANTROLENE SODIUM 25 MG: 25 CAPSULE ORAL at 21:58

## 2024-04-01 ASSESSMENT — PAIN - FUNCTIONAL ASSESSMENT: PAIN_FUNCTIONAL_ASSESSMENT: ADULT NONVERBAL PAIN SCALE (NPVS)

## 2024-04-01 ASSESSMENT — PAIN SCALES - GENERAL: PAINLEVEL_OUTOF10: 0

## 2024-04-01 NOTE — PROGRESS NOTES
[facility]     Name: Hayder Fink MRN: [id number]   : 1955 Hospital: [facility]   Date: 2024        Impression Plan   Multilobar collapse on left  Poor airway protection/weak cough  Dysphagia  Anemia  Hx of CVA  Hx of aspiration               Chest PT  Acetylcysteine nebs  Albuterol nebs  Bronchoscopy this am @ 11 am, d/w pt and Mrs Fink  Pt would benefit from a percussion vest as an outpt to help with secretions.   I discussed risks with bronchoscopy with the wife, including sedation risks and airway protection risks. She understands that the pt is more sensitive to both of these factors. Pt is DNR and I informed her that DNR can temporarily be reversed for procedure only. Wife understand that mucus plugging will likely reoccur.              Radiology  ( personally reviewed) CT chest: complete collpase of EFRAÍN and LLL, small left pleural effusion   ABG Invalid input(s): \"PHI\", \"PO2I\", \"PCO2I\"       Subjective     Cc: AMS    69 yo with PMHx of a-fib, COPD, hemorrhagic CVA in  and recurrent aspiration PNA presenting with agitation. CXR read as left pleural effusion. CT chest shows multilobar collapse on the left due to mucus plug in the left mainstem. Wife reports that pt is unable to mount an quality cough and that sputum often seems to get hung up in his throat. He takes all feeds through a PEG tube. Pt is currently on RA and appears comfortable, however wife reports that he has seemed more agitated lately and she feels this may be due to the mucus plug.     24:  Pt alert and receiving neb.  Will answer yes to intermittent questions.  On RA    Past Medical History:   Diagnosis Date    AF (paroxysmal atrial fibrillation) (HCC)     Anxiety and depression     COPD (chronic obstructive pulmonary disease) (HCC)     DM type 2 causing vascular disease (HCC)     Dysphagia as late effect of stroke     Gastroparesis due to DM (HCC)     Hx of completed stroke   FiO2 PEEP                    Vital Signs:     TMAX(24)      Intake/Output:   Last shift:         Last 3 shifts: No intake/output data recorded.[email or web address]  Intake/Output Summary (Last 24 hours) at 4/1/2024 0844  Last data filed at 4/1/2024 0237  Gross per 24 hour   Intake --   Output 1450 ml   Net -1450 ml       EXAM:   GENERAL: awake, alert, on RA, non-verbal HEENT:  PERRL, EOMI, no alar flaring or epistaxis, oral mucosa moist without cyanosis, NECK:  no jugular vein distention, no retractions, no thyromegaly or masses, LUNGS: no breathsounds on the left, clear on the right, HEART:  Regular rate and rhythm with no MGR; no edema is present, ABDOMEN: PEG,  soft with no tenderness, bowel sounds present, EXTREMITIES:  warm with no cyanosis, SKIN:  no jaundice or ecchymosis, and NEUROLOGIC:  alert and oriented, grossly non-focal    Tirso Carranza MD  [facility]

## 2024-04-01 NOTE — PROGRESS NOTES
[facility]  [address] [place] [address] [phone]    [facility]  Hospitalist Group                                                                                          Hospitalist Progress Note  Millicent Callejas MD        Date of Service:  2024  NAME:  Hayder Fink  :  1955  MRN:  [id number]      Admission Summary:   68-year-old male is admitted for multilobar left lung collapse    Interval history / Subjective:   Doing well post bronchoscopy     Assessment & Plan:     Multilobar collapse on left  -Not improved with antibiotics  -Suspected mucous plug  -Pulmonology evaluated and planned for bronchoscopy which was done on  and showed a large amount of thick secretions in the mid trachea and cleared.  Bronchial washings were sent for micro and cytology    Agitation and sundowning  Anxiety and depression  -Continue sertraline, trazodone, Xanax as needed  -While here added nightly risperidone    Iron deficiency anemia  -Given IV iron  -Will also need a blood transfusion today.  Will give with IV Lasix    Diastolic congestive heart failure  -Not in exacerbation but does appear fluid overloaded  -He has not been getting his p.o. Lasix here.  Will give a dose of IV Lasix in preparation for blood transfusion and another dose of IV Lasix in the morning  -Echo with preserved ejection fraction but abnormal diastolic function  -Plan to continue his usual 40 mg Lasix via PEG daily and add a low-dose 20 mg Lasix in the evening on discharge    Type 2 diabetes  -Continue with diabetic diet and SSI per protocol  -Recent A1c 8.1  -Continue home Lantus    History of completed stroke  Left hemiparesis  Dysphagia as late effect of stroke s/p gastrostomy  -Continue Flexeril, dantrolene, aspirin and statin  -Continue PEG feedings    Paroxysmal A-fib  -Continue Coreg, diltiazem    Hypotension  -Continue midodrine for blood pressure     acetylcysteine (MUCOMYST) 20 % solution 600 mg  600 mg Inhalation BID RT     ______________________________________________________________________  EXPECTED LENGTH OF STAY:    ACTUAL LENGTH OF STAY:          3                 Millicent Callejas MD

## 2024-04-01 NOTE — PROGRESS NOTES
Physician Progress Note      PATIENT:               GRACE ORTIZ  CSN #:                  [id number]  :                       1955  ADMIT DATE:       3/29/2024 11:12 AM  DISCH DATE:  RESPONDING  PROVIDER #:        Millicent Loco MD          QUERY TEXT:    Patient admitted with pleural effusion. Noted to have \"moderate malnutrition\"   documented in  assessment. If possible, please document in progress   notes and discharge summary if you are evaluating and /or treating any of the   following:    The medical record reflects the following:  Risk Factors: PEG, dysphagia  Clinical Indicators:  RD: Malnutrition Status:  Moderate malnutrition   (24 1255)  Context:  Chronic Illness  Findings of the 6 clinical characteristics of malnutrition:  Energy Intake:  Mild decrease in energy intake  Weight Loss:  Mild weight loss (specify amount and time period) (per wife)  Body Fat Loss:  Mild body fat loss Triceps, Fat Overlying Ribs  Muscle Mass Loss:  Mild muscle mass loss Clavicles (pectoralis & deltoids),   Scapula (trapezius), Temples (temporalis)  Treatment: RD assessment, PEG with TF, monitoring    ASPEN Criteria:    https://aspenjournals.onlinelibrary.oshea.com/doi/full/10.1177/329623454822078  5    Thank you,    Daisy Blum RN  CDI  Options provided:  -- Protein calorie malnutrition moderate  -- Other - I will add my own diagnosis  -- Disagree - Not applicable / Not valid  -- Disagree - Clinically unable to determine / Unknown  -- Refer to Clinical Documentation Reviewer    PROVIDER RESPONSE TEXT:    This patient has moderate protein calorie malnutrition.    Query created by: Daisy Blum on 2024 1:29 PM      Electronically signed by:  Millicent Loco MD 2024 1:32 PM

## 2024-04-01 NOTE — PROGRESS NOTES
Spiritual Care Assessment/Progress Note  [facility]    Name: Hayder Fink MRN: [id number]    Age: 68 y.o.     Sex: male   Language: English     Date: 4/1/2024            Total Time Calculated: 16 min              Spiritual Assessment begun in [facility] B[facility] MULTI-SPECIALTY ONCOLOGY 2  Service Provided For:: Patient and family together  Referral/Consult From:: Rounding  Encounter Overview/Reason : Palliative Care    Spiritual beliefs:      [x] Involved in a abhilash tradition/spiritual practice: Spiritism     [] Supported by a abhilash community:      [] Claims no spiritual orientation:      [] Seeking spiritual identity:           [] Adheres to an individual form of spirituality:      [] Not able to assess:                Identified resources for coping and support system:   Support System: Family members       [] Prayer                  [] Devotional reading               [] Music                  [] Guided Imagery     [] Pet visits                                        [] Other: (COMMENT)     Specific area/focus of visit   Encounter:    Crisis:    Spiritual/Emotional needs: Type: Spiritual Support  Ritual, Rites and Sacraments:    Grief, Loss, and Adjustments:    Ethics/Mediation:    Behavioral Health:    Palliative Care: Type: Palliative Care, Initial/Spiritual Assessment  Advance Care Planning:      Plan/Referrals: Other (Comment) (Contact Spiritual Health fro further visits)    Narrative: Initial spiritual assessment o 5th floor. Reviewed chart prior to visit. Mr. Fink was being preped to go out for a procedure, his wife shavon was present.  He gave me a  smile and she indicated she enjoyed Sr. Costa's visits last time they were here.  He is Yarsanism though has not been able to attend Yazidi for a while.  She was praised presbyterian and now sometimes goes to a Worship Yazidi.  They have a great connection with each other, his face and eyes lighting up when Shavon interacts with him.

## 2024-04-01 NOTE — PLAN OF CARE
Problem: Pain  Goal: Verbalizes/displays adequate comfort level or baseline comfort level  Outcome: Progressing     Problem: Skin/Tissue Integrity  Goal: Absence of new skin breakdown  Description: 1.  Monitor for areas of redness and/or skin breakdown  2.  Assess vascular access sites hourly  3.  Every 4-6 hours minimum:  Change oxygen saturation probe site  4.  Every 4-6 hours:  If on nasal continuous positive airway pressure, respiratory therapy assess nares and determine need for appliance change or resting period.  Outcome: Progressing     Problem: Safety - Adult  Goal: Free from fall injury  Outcome: Progressing     Problem: Chronic Conditions and Co-morbidities  Goal: Patient's chronic conditions and co-morbidity symptoms are monitored and maintained or improved  Outcome: Progressing     Problem: Respiratory - Adult  Goal: Achieves optimal ventilation and oxygenation  4/1/2024 1947 by Wilda Haq, RN  Outcome: Progressing  4/1/2024 0911 by Lety Conrad, RCRAYA  Outcome: Progressing     Problem: Nutrition Deficit:  Goal: Optimize nutritional status  Outcome: Progressing

## 2024-04-01 NOTE — PERIOP NOTE
TRANSFER - IN REPORT:    Verbal report received from Dakotah AVILA on Hayder [place] Fink  being received from 530 for ordered procedure      Report consisted of patient's Situation, Background, Assessment and   Recommendations(SBAR).     Information from the following report(s) Alarm Parameters, Pre Procedure Checklist, and Procedure Verification was reviewed with the receiving nurse.    Opportunity for questions and clarification was provided.      Assessment completed upon patient's arrival to unit and care assumed.

## 2024-04-01 NOTE — PROGRESS NOTES
Comprehensive Nutrition Assessment    Type and Reason for Visit:  Initial    Nutrition Recommendations/Plan:   Resume home TF:  Osmolite 1.5 @ 60 mL x 16 hours   (Goal volume 960 mL/day, ex: 6 am - 10 pm)  1 Prosource/day, flush with 15 mL before and after   mL q 4 hours     Miralax 1x/day at minimum      Malnutrition Assessment:  Malnutrition Status:  Moderate malnutrition (04/01/24 1255)    Context:  Chronic Illness     Findings of the 6 clinical characteristics of malnutrition:  Energy Intake:  Mild decrease in energy intake (Comment)  Weight Loss:  Mild weight loss (specify amount and time period) (per wife)     Body Fat Loss:  Mild body fat loss Triceps, Fat Overlying Ribs   Muscle Mass Loss:  Mild muscle mass loss Clavicles (pectoralis & deltoids), Scapula (trapezius), Temples (temporalis)  Fluid Accumulation:  No significant fluid accumulation     Strength:   N/A    Nutrition Assessment:     Patient is a 68 year old male admitted with Pleural effusion [J90]  Elevated brain natriuretic peptide (BNP) level [R79.89]  Lung consolidation (HCC) [J18.1]  Elevated d-dimer [R79.89]  Anemia, unspecified type [D64.9]. He  has a past medical history of AF (paroxysmal atrial fibrillation) (HCC), Anxiety and depression, COPD (chronic obstructive pulmonary disease) (HCC), DM type 2 causing vascular disease (HCC), Dysphagia as late effect of stroke, Gastroparesis due to DM (HCC), Hx of completed stroke, Hyperlipidemia, Hypertension, Hypothyroid, PEG (percutaneous endoscopic gastrostomy) status (HCC), and Rheumatoid arthritis (HCC).  Familiar to services, just discharged home on new cyclic feed regimen. Patient's wife at bedside; reports tolerance to feeds. Wife noted increased edema upon discharge, questioning if related to modified regimen. While edema may be related to inadequate protein intake, current formula provides 75% needs - RD will introduce additional protein modular to regimen in attempt to decrease  protein-energy intake as evidenced by weight loss, Criteria as identified in malnutrition assessment    Nutrition Interventions:   Food and/or Nutrient Delivery: Start Tube Feeding  Nutrition Education/Counseling: No recommendation at this time  Coordination of Nutrition Care: Continue to monitor while inpatient  Plan of Care discussed with: patient's wife    Goals:     Goals: Tolerate nutrition support at goal rate, by next RD assessment       Nutrition Monitoring and Evaluation:   Behavioral-Environmental Outcomes: None Identified  Food/Nutrient Intake Outcomes: Enteral Nutrition Intake/Tolerance  Physical Signs/Symptoms Outcomes: Biochemical Data, Weight, GI Status, Fluid Status or Edema    Discharge Planning:    Enteral Nutrition     Karla Agudelo RD MS  Contact: Ext: [phone], or via [employer]

## 2024-04-01 NOTE — CARE COORDINATION
04/01/24 1512   Readmission Assessment   Number of Days since last admission? 8-30 days  (readmitted 8 days post discharge)   Previous Disposition Home with Family   Who is being Interviewed   (wife)   What was the patient's/caregiver's perception as to why they think they needed to return back to the hospital?   (pt not getting better)   Did you visit your Primary Care Physician after you left the hospital, before you returned this time?   (N/A)   Did you see a specialist, such as Cardiac, Pulmonary, Orthopedic Physician, etc. after you left the hospital?   (N/A)   Who advised the patient to return to the hospital? Caregiver   Does the patient report anything that got in the way of taking their medications? No   In our efforts to provide the best possible care to you and others like you, can you think of anything that we could have done to help you after you left the hospital the first time, so that you might not have needed to return so soon? Other (Comment)  (pt with complex medical hx)

## 2024-04-01 NOTE — CARE COORDINATION
04/01/24 1516   Service Assessment   Patient Orientation Unable to Assess   History Provided By Spouse   Primary Caregiver Spouse   Support Systems Spouse/Significant Other   PCP Verified by CM Yes  (Dr. Fermin Slade)   Prior Functional Level Assistance with the following:;Bathing;Dressing;Toileting   Current Functional Level Assistance with the following:;Bathing;Dressing;Toileting   Can patient return to prior living arrangement Yes   Financial Resources Medicare   Social/Functional History   Lives With Spouse   Type of Home Apartment   Home Equipment Hospital bed   ADL Assistance Needs assistance   Toileting Needs assistance   Ambulation Assistance Needs assistance   Discharge Planning   Patient expects to be discharged to: Apartment   Services At/After Discharge   Mode of Transport at Discharge Other (see comment)  (family)

## 2024-04-01 NOTE — PROCEDURES
Pulmonary, Critical Care, and Sleep Medicine    Name: Hayder Fink MRN: [id number]   : 1955 Hospital: [facility]   Date: 2024        Bronchoscopy Report    Procedure: Therapeutic bronchoscopy.    Indication: Abnormal chest imaging    Consent/Treatment: Informed consent was obtained from the  family after risks, benefits and alternatives were explained. Timeout verified the correct patient and correct procedure.     Anesthesia:   Moderate sedation with Fentanyl 50 mcg and Versed 2mg was used  Moderate (conscious) sedation was administered by the endoscopy nurse and supervised by the endoscopist.  The following parameters were monitored: oxygen saturation, heart rate, blood pressure, respiratory rate, EKG, adequacy of pulmonary ventilation, and response to care.  Total physician intraservice time was 15 minutes.     Procedure Details:   -- The bronchoscope was introduced orally with use of a bite block.   -- The vocal cords were found to be normal.  -- The trachea and mj were completely inspected and a large amount of thick secretions were noted in the mid trachea.  This was easily suctioned clear.  -- The right-sided endobronchial anatomy was completely inspected and mild diffuse erythema and edema involving the mucosa.  -- The left-sided endobronchial anatomy was completely inspected and mild diffuse erythema and edema noted involving the mucosa.     Specimens:   Bronchial washings were sent for  microbiology and cytology    Rapid On-Site Evaluation:  mucous plugging of airway    Complications: none    Estimated Blood Loss: Minimal    Tirso Carranza MD

## 2024-04-01 NOTE — PLAN OF CARE
Problem: Pain  Goal: Verbalizes/displays adequate comfort level or baseline comfort level  Outcome: Progressing     Problem: Skin/Tissue Integrity  Goal: Absence of new skin breakdown  Description: 1.  Monitor for areas of redness and/or skin breakdown  2.  Assess vascular access sites hourly  3.  Every 4-6 hours minimum:  Change oxygen saturation probe site  4.  Every 4-6 hours:  If on nasal continuous positive airway pressure, respiratory therapy assess nares and determine need for appliance change or resting period.  Outcome: Progressing     Problem: Safety - Adult  Goal: Free from fall injury  Outcome: Progressing     Problem: Chronic Conditions and Co-morbidities  Goal: Patient's chronic conditions and co-morbidity symptoms are monitored and maintained or improved  Outcome: Progressing     Problem: Respiratory - Adult  Goal: Achieves optimal ventilation and oxygenation  4/1/2024 0324 by Maria A Ojeda RN  Outcome: Progressing  4/1/2024 0106 by Tatum Gu, RT  Outcome: Progressing

## 2024-04-01 NOTE — PERIOP NOTE
1108   Endoscope was pre-cleaned at bedside immediately following procedure by Emery valera      1120   Patient tolerated procedure.   Abdomen soft and patient arousable and voices no complaints.   Patient transported to endoscopy recovery area.   Report given to post procedure RNKandis.         1204   TRANSFER - OUT REPORT:    Verbal report given to [place] on Hayder [place] Fink  being transferred to [facility] for routine progression of patient care       Report consisted of patient's Situation, Background, Assessment and   Recommendations(SBAR).     Information from the following report(s) Nurse Handoff Report and Cardiac Rhythm A-fib  was reviewed with the receiving nurse.           Lines:   Peripheral IV 03/29/24 Right;Dorsal Forearm (Active)   Site Assessment Other (Comment) 04/01/24 1014   Line Status Flushed;No blood return 04/01/24 1014   Line Care Connections checked and tightened 04/01/24 1014   Phlebitis Assessment No symptoms 04/01/24 1014   Infiltration Assessment 0 04/01/24 0946   Alcohol Cap Used Yes 04/01/24 1014   Dressing Status Old drainage noted 04/01/24 1014   Dressing Type Transparent 04/01/24 0946        Opportunity for questions and clarification was provided.      Patient transported with:  Monitor

## 2024-04-02 ENCOUNTER — APPOINTMENT (OUTPATIENT)
Facility: HOSPITAL | Age: 69
DRG: 206 | End: 2024-04-02
Payer: MEDICARE

## 2024-04-02 LAB
ABO + RH BLD: NORMAL
ANION GAP SERPL CALC-SCNC: 4 MMOL/L (ref 5–15)
BASOPHILS # BLD: 0 K/UL (ref 0–0.1)
BASOPHILS NFR BLD: 0 % (ref 0–1)
BLD PROD TYP BPU: NORMAL
BLOOD BANK BLOOD PRODUCT EXPIRATION DATE: NORMAL
BLOOD BANK DISPENSE STATUS: NORMAL
BLOOD BANK ISBT PRODUCT BLOOD TYPE: 5100
BLOOD BANK PRODUCT CODE: NORMAL
BLOOD BANK UNIT TYPE AND RH: NORMAL
BLOOD GROUP ANTIBODIES SERPL: NORMAL
BPU ID: NORMAL
BUN SERPL-MCNC: 30 MG/DL (ref 6–20)
BUN/CREAT SERPL: 31 (ref 12–20)
CALCIUM SERPL-MCNC: 9.1 MG/DL (ref 8.5–10.1)
CHLORIDE SERPL-SCNC: 106 MMOL/L (ref 97–108)
CO2 SERPL-SCNC: 27 MMOL/L (ref 21–32)
CREAT SERPL-MCNC: 0.98 MG/DL (ref 0.7–1.3)
CROSSMATCH RESULT: NORMAL
CYTOLOGY-NON GYN: NORMAL
DIFFERENTIAL METHOD BLD: ABNORMAL
EOSINOPHIL # BLD: 0.1 K/UL (ref 0–0.4)
EOSINOPHIL NFR BLD: 2 % (ref 0–7)
ERYTHROCYTE [DISTWIDTH] IN BLOOD BY AUTOMATED COUNT: 16.7 % (ref 11.5–14.5)
GLUCOSE BLD STRIP.AUTO-MCNC: 171 MG/DL (ref 65–117)
GLUCOSE BLD STRIP.AUTO-MCNC: 224 MG/DL (ref 65–117)
GLUCOSE BLD STRIP.AUTO-MCNC: 245 MG/DL (ref 65–117)
GLUCOSE BLD STRIP.AUTO-MCNC: 253 MG/DL (ref 65–117)
GLUCOSE SERPL-MCNC: 189 MG/DL (ref 65–100)
HCT VFR BLD AUTO: 27.1 % (ref 36.6–50.3)
HGB BLD-MCNC: 8.6 G/DL (ref 12.1–17)
IMM GRANULOCYTES # BLD AUTO: 0 K/UL (ref 0–0.04)
IMM GRANULOCYTES NFR BLD AUTO: 0 % (ref 0–0.5)
LYMPHOCYTES # BLD: 1.2 K/UL (ref 0.8–3.5)
LYMPHOCYTES NFR BLD: 17 % (ref 12–49)
MCH RBC QN AUTO: 26.3 PG (ref 26–34)
MCHC RBC AUTO-ENTMCNC: 31.7 G/DL (ref 30–36.5)
MCV RBC AUTO: 82.9 FL (ref 80–99)
MONOCYTES # BLD: 0.6 K/UL (ref 0–1)
MONOCYTES NFR BLD: 8 % (ref 5–13)
NEUTS SEG # BLD: 5.3 K/UL (ref 1.8–8)
NEUTS SEG NFR BLD: 73 % (ref 32–75)
NRBC # BLD: 0 K/UL (ref 0–0.01)
NRBC BLD-RTO: 0 PER 100 WBC
PLATELET # BLD AUTO: 411 K/UL (ref 150–400)
PMV BLD AUTO: 11 FL (ref 8.9–12.9)
POTASSIUM SERPL-SCNC: 4.3 MMOL/L (ref 3.5–5.1)
RBC # BLD AUTO: 3.27 M/UL (ref 4.1–5.7)
SERVICE CMNT-IMP: ABNORMAL
SODIUM SERPL-SCNC: 137 MMOL/L (ref 136–145)
SPECIMEN EXP DATE BLD: NORMAL
UNIT DIVISION: 0
UNIT ISSUE DATE/TIME: NORMAL
WBC # BLD AUTO: 7.3 K/UL (ref 4.1–11.1)

## 2024-04-02 PROCEDURE — 94640 AIRWAY INHALATION TREATMENT: CPT

## 2024-04-02 PROCEDURE — 2580000003 HC RX 258: Performed by: INTERNAL MEDICINE

## 2024-04-02 PROCEDURE — 36430 TRANSFUSION BLD/BLD COMPNT: CPT

## 2024-04-02 PROCEDURE — 6370000000 HC RX 637 (ALT 250 FOR IP): Performed by: INTERNAL MEDICINE

## 2024-04-02 PROCEDURE — 36415 COLL VENOUS BLD VENIPUNCTURE: CPT

## 2024-04-02 PROCEDURE — 6360000002 HC RX W HCPCS: Performed by: FAMILY MEDICINE

## 2024-04-02 PROCEDURE — 6360000002 HC RX W HCPCS: Performed by: INTERNAL MEDICINE

## 2024-04-02 PROCEDURE — 1100000000 HC RM PRIVATE

## 2024-04-02 PROCEDURE — 6360000002 HC RX W HCPCS: Performed by: NURSE PRACTITIONER

## 2024-04-02 PROCEDURE — 82962 GLUCOSE BLOOD TEST: CPT

## 2024-04-02 PROCEDURE — 94761 N-INVAS EAR/PLS OXIMETRY MLT: CPT

## 2024-04-02 PROCEDURE — 85025 COMPLETE CBC W/AUTO DIFF WBC: CPT

## 2024-04-02 PROCEDURE — 80048 BASIC METABOLIC PNL TOTAL CA: CPT

## 2024-04-02 PROCEDURE — 71045 X-RAY EXAM CHEST 1 VIEW: CPT

## 2024-04-02 RX ORDER — LEVOFLOXACIN 5 MG/ML
750 INJECTION, SOLUTION INTRAVENOUS EVERY 24 HOURS
Status: DISCONTINUED | OUTPATIENT
Start: 2024-04-02 | End: 2024-04-05

## 2024-04-02 RX ORDER — FUROSEMIDE 10 MG/ML
40 INJECTION INTRAMUSCULAR; INTRAVENOUS ONCE
Status: COMPLETED | OUTPATIENT
Start: 2024-04-02 | End: 2024-04-02

## 2024-04-02 RX ORDER — FUROSEMIDE 40 MG/1
40 TABLET ORAL DAILY
Status: DISCONTINUED | OUTPATIENT
Start: 2024-04-03 | End: 2024-04-06 | Stop reason: HOSPADM

## 2024-04-02 RX ADMIN — BUDESONIDE 500 MCG: 0.5 INHALANT RESPIRATORY (INHALATION) at 19:45

## 2024-04-02 RX ADMIN — TRAZODONE HYDROCHLORIDE 150 MG: 100 TABLET ORAL at 20:31

## 2024-04-02 RX ADMIN — FUROSEMIDE 40 MG: 10 INJECTION, SOLUTION INTRAMUSCULAR; INTRAVENOUS at 20:32

## 2024-04-02 RX ADMIN — INSULIN GLARGINE 14 UNITS: 100 INJECTION, SOLUTION SUBCUTANEOUS at 09:33

## 2024-04-02 RX ADMIN — DANTROLENE SODIUM 25 MG: 25 CAPSULE ORAL at 20:32

## 2024-04-02 RX ADMIN — SODIUM CHLORIDE, PRESERVATIVE FREE 10 ML: 5 INJECTION INTRAVENOUS at 20:34

## 2024-04-02 RX ADMIN — SODIUM CHLORIDE, PRESERVATIVE FREE 10 ML: 5 INJECTION INTRAVENOUS at 09:31

## 2024-04-02 RX ADMIN — IPRATROPIUM BROMIDE AND ALBUTEROL SULFATE 1 DOSE: .5; 3 SOLUTION RESPIRATORY (INHALATION) at 19:39

## 2024-04-02 RX ADMIN — CARVEDILOL 12.5 MG: 12.5 TABLET, FILM COATED ORAL at 20:32

## 2024-04-02 RX ADMIN — MIDODRINE HYDROCHLORIDE 2.5 MG: 5 TABLET ORAL at 09:33

## 2024-04-02 RX ADMIN — ASPIRIN 81 MG: 81 TABLET, CHEWABLE ORAL at 09:34

## 2024-04-02 RX ADMIN — SODIUM CHLORIDE, PRESERVATIVE FREE 10 ML: 5 INJECTION INTRAVENOUS at 09:34

## 2024-04-02 RX ADMIN — MIDODRINE HYDROCHLORIDE 2.5 MG: 5 TABLET ORAL at 20:32

## 2024-04-02 RX ADMIN — INSULIN LISPRO 2 UNITS: 100 INJECTION, SOLUTION INTRAVENOUS; SUBCUTANEOUS at 11:45

## 2024-04-02 RX ADMIN — ARFORMOTEROL TARTRATE 15 MCG: 15 SOLUTION RESPIRATORY (INHALATION) at 19:45

## 2024-04-02 RX ADMIN — ARFORMOTEROL TARTRATE 15 MCG: 15 SOLUTION RESPIRATORY (INHALATION) at 08:31

## 2024-04-02 RX ADMIN — DANTROLENE SODIUM 25 MG: 25 CAPSULE ORAL at 09:33

## 2024-04-02 RX ADMIN — DANTROLENE SODIUM 25 MG: 25 CAPSULE ORAL at 14:15

## 2024-04-02 RX ADMIN — SERTRALINE HYDROCHLORIDE 200 MG: 50 TABLET ORAL at 09:34

## 2024-04-02 RX ADMIN — DILTIAZEM HYDROCHLORIDE 60 MG: 30 TABLET ORAL at 09:33

## 2024-04-02 RX ADMIN — FUROSEMIDE 40 MG: 10 INJECTION, SOLUTION INTRAMUSCULAR; INTRAVENOUS at 06:25

## 2024-04-02 RX ADMIN — IPRATROPIUM BROMIDE AND ALBUTEROL SULFATE 1 DOSE: .5; 3 SOLUTION RESPIRATORY (INHALATION) at 08:31

## 2024-04-02 RX ADMIN — ACETYLCYSTEINE 600 MG: 200 INHALANT RESPIRATORY (INHALATION) at 08:31

## 2024-04-02 RX ADMIN — BUDESONIDE 500 MCG: 0.5 INHALANT RESPIRATORY (INHALATION) at 08:31

## 2024-04-02 RX ADMIN — RISPERIDONE 0.5 MG: 1 SOLUTION ORAL at 20:34

## 2024-04-02 RX ADMIN — FOLIC ACID 1 MG: 1 TABLET ORAL at 09:33

## 2024-04-02 RX ADMIN — INSULIN LISPRO 4 UNITS: 100 INJECTION, SOLUTION INTRAVENOUS; SUBCUTANEOUS at 16:31

## 2024-04-02 RX ADMIN — FAMOTIDINE 40 MG: 20 TABLET, FILM COATED ORAL at 09:33

## 2024-04-02 RX ADMIN — DILTIAZEM HYDROCHLORIDE 60 MG: 30 TABLET ORAL at 20:32

## 2024-04-02 RX ADMIN — LEVOFLOXACIN 750 MG: 5 INJECTION, SOLUTION INTRAVENOUS at 13:02

## 2024-04-02 RX ADMIN — LEVOTHYROXINE SODIUM 75 MCG: 0.07 TABLET ORAL at 06:25

## 2024-04-02 RX ADMIN — CARVEDILOL 12.5 MG: 12.5 TABLET, FILM COATED ORAL at 09:34

## 2024-04-02 ASSESSMENT — PAIN SCALES - GENERAL
PAINLEVEL_OUTOF10: 0

## 2024-04-02 NOTE — PROGRESS NOTES
[facility] [place]     Name: Hayder Fink MRN: [id number]   : 1955 Hospital: [facility]   Date: 2024        Impression Plan   Multilobar collapse on left  S/p bronch with BAL   Poor airway protection/weak cough  Dysphagia  Anemia  Hx of CVA  Hx of aspiration  DHF               Follow cytology and cultures- prelim +pseudomonas  Start levaquin to cover for pseudomonas (noted 2 weeks ago at prior hospitalization he was treated with ~5 days empiric zosyn).  Follow pseudomonas sensitivities (noted contraindication to ciprofloxacin with Tizanidine)  Chest PT  Acetylcysteine nebs  Albuterol nebs  Pt would benefit from a percussion vest as an outpt to help with secretions-- CM order for home set up             Radiology  ( personally reviewed) CT chest: complete collpase of EFRANÍ and LLL, small left pleural effusion   ABG Invalid input(s): \"PHI\", \"PO2I\", \"PCO2I\"       Subjective     Cc: AMS    67 yo with PMHx of a-fib, COPD, hemorrhagic CVA in  and recurrent aspiration PNA presenting with agitation. CXR read as left pleural effusion. CT chest shows multilobar collapse on the left due to mucus plug in the left mainstem. Wife reports that pt is unable to mount an quality cough and that sputum often seems to get hung up in his throat. He takes all feeds through a PEG tube. Pt is currently on RA and appears comfortable, however wife reports that he has seemed more agitated lately and she feels this may be due to the mucus plug.     24:  Pt alert and receiving neb.  Will answer yes to intermittent questions.  On RA  : pt denies any complaints. Remains on room air. No acute events per nurse.    Past Medical History:   Diagnosis Date    AF (paroxysmal atrial fibrillation) (HCC)     Anxiety and depression     COPD (chronic obstructive pulmonary disease) (HCC)     DM type 2 causing vascular disease (HCC)     patients wife states type 1 diabetes    Dysphagia as late effect

## 2024-04-02 NOTE — CARE COORDINATION
4/2/2024   CARE MANAGEMENT NOTE:  CM reviewed EMR.  READMISSION:  Pt was admitted to  from 3/15 to 3/21 with met enceph.  Pt was readmitted to  on 3/29 with left pleural effusion.    Reportedly, pt resides with his wife Shavon ([phone]).    RUR 21%    Transition Plan of Care:  Pulmonary following for medical management  CM received order for chest PT vest for home.  PT will need to do chest measurements.  Also, MD please state in progress note that pt is unable to clear his own secretions.   3.   [facility] provides pt's home Tfs.  [employer] supplied pt's hospital bed  4.   Outpatient follow up  5.   Family will transport pt home in their own w/c vehicle    CM will continue to follow pt until discharged.  Surinder

## 2024-04-02 NOTE — PROGRESS NOTES
[facility]  [address] [place] [address] [phone]    [facility] Adult  Hospitalist Group                                                                                          Hospitalist Progress Note  Millicent Callejas MD        Date of Service:  2024  NAME:  Hayder Fink  :  1955  MRN:  [id number]      Admission Summary:   68-year-old male is admitted for multilobar left lung collapse    Interval history / Subjective:   Doing well.  Wife states he look a little bit less swollen     Assessment & Plan:     Multilobar collapse on left  -Not improved with antibiotics  -Suspected mucous plug  -Pulmonology evaluated and planned for bronchoscopy which was done on  and showed a large amount of thick secretions in the mid trachea and cleared.  Bronchial washings were sent for micro and cytology -preliminary cultures growing Pseudomonas so started on Levaquin  -Continue acetylcysteine nebs  -He would benefit from a percussion vest as outpatient to help with secretions    Agitation and sundowning  Anxiety and depression  -Continue sertraline, trazodone, Xanax as needed  -While here added nightly risperidone    Iron deficiency anemia  -Given IV iron  -Will also need a blood transfusion today.  Will give with IV Lasix    Diastolic congestive heart failure  -Not in exacerbation but does appear fluid overloaded  -Given IV Lasix here.  1 more dose of IV Lasix tonight prior to starting oral regimen tomorrow  -Echo with preserved ejection fraction but abnormal diastolic function  -Plan to continue his usual 40 mg Lasix via PEG daily and add a low-dose 20 mg Lasix in the evening on discharge    Type 2 diabetes  -Continue with diabetic diet and SSI per protocol  -Recent A1c 8.1  -Continue home Lantus    History of completed stroke  Left hemiparesis  Dysphagia as late effect of stroke s/p gastrostomy  -Continue Flexeril, dantrolene, aspirin and  Units  14 Units SubCUTAneous Daily    levothyroxine (SYNTHROID) tablet 75 mcg  75 mcg Per G Tube Daily    acetaminophen (TYLENOL) 160 MG/5ML solution 650 mg  650 mg Per G Tube Q6H PRN    Or    acetaminophen (TYLENOL) suppository 650 mg  650 mg Rectal Q6H PRN    tiZANidine (ZANAFLEX) tablet 4 mg  4 mg Per G Tube Q6H PRN    risperiDONE (RisperDAL) 1 MG/ML oral solution 0.5 mg  0.5 mg PEG Tube Nightly    acetylcysteine (MUCOMYST) 20 % solution 600 mg  600 mg Inhalation BID RT     ______________________________________________________________________  EXPECTED LENGTH OF STAY:    ACTUAL LENGTH OF STAY:          4                 Millicent Callejas MD

## 2024-04-03 LAB
ANION GAP SERPL CALC-SCNC: 4 MMOL/L (ref 5–15)
BUN SERPL-MCNC: 33 MG/DL (ref 6–20)
BUN/CREAT SERPL: 32 (ref 12–20)
CALCIUM SERPL-MCNC: 9.1 MG/DL (ref 8.5–10.1)
CHLORIDE SERPL-SCNC: 101 MMOL/L (ref 97–108)
CO2 SERPL-SCNC: 28 MMOL/L (ref 21–32)
CREAT SERPL-MCNC: 1.02 MG/DL (ref 0.7–1.3)
GLUCOSE BLD STRIP.AUTO-MCNC: 272 MG/DL (ref 65–117)
GLUCOSE BLD STRIP.AUTO-MCNC: 296 MG/DL (ref 65–117)
GLUCOSE BLD STRIP.AUTO-MCNC: 323 MG/DL (ref 65–117)
GLUCOSE BLD STRIP.AUTO-MCNC: 388 MG/DL (ref 65–117)
GLUCOSE SERPL-MCNC: 348 MG/DL (ref 65–100)
POTASSIUM SERPL-SCNC: 4.1 MMOL/L (ref 3.5–5.1)
SERVICE CMNT-IMP: ABNORMAL
SODIUM SERPL-SCNC: 133 MMOL/L (ref 136–145)

## 2024-04-03 PROCEDURE — 6360000002 HC RX W HCPCS: Performed by: INTERNAL MEDICINE

## 2024-04-03 PROCEDURE — 6370000000 HC RX 637 (ALT 250 FOR IP): Performed by: INTERNAL MEDICINE

## 2024-04-03 PROCEDURE — 6360000002 HC RX W HCPCS: Performed by: NURSE PRACTITIONER

## 2024-04-03 PROCEDURE — 94640 AIRWAY INHALATION TREATMENT: CPT

## 2024-04-03 PROCEDURE — 6370000000 HC RX 637 (ALT 250 FOR IP): Performed by: FAMILY MEDICINE

## 2024-04-03 PROCEDURE — 82962 GLUCOSE BLOOD TEST: CPT

## 2024-04-03 PROCEDURE — 30233N1 TRANSFUSION OF NONAUTOLOGOUS RED BLOOD CELLS INTO PERIPHERAL VEIN, PERCUTANEOUS APPROACH: ICD-10-PCS | Performed by: STUDENT IN AN ORGANIZED HEALTH CARE EDUCATION/TRAINING PROGRAM

## 2024-04-03 PROCEDURE — 2580000003 HC RX 258: Performed by: INTERNAL MEDICINE

## 2024-04-03 PROCEDURE — 1100000000 HC RM PRIVATE

## 2024-04-03 PROCEDURE — 36415 COLL VENOUS BLD VENIPUNCTURE: CPT

## 2024-04-03 PROCEDURE — 99223 1ST HOSP IP/OBS HIGH 75: CPT | Performed by: NURSE PRACTITIONER

## 2024-04-03 PROCEDURE — 94761 N-INVAS EAR/PLS OXIMETRY MLT: CPT

## 2024-04-03 PROCEDURE — 80048 BASIC METABOLIC PNL TOTAL CA: CPT

## 2024-04-03 RX ORDER — CARVEDILOL 12.5 MG/1
25 TABLET ORAL 2 TIMES DAILY
Status: DISCONTINUED | OUTPATIENT
Start: 2024-04-03 | End: 2024-04-06 | Stop reason: HOSPADM

## 2024-04-03 RX ORDER — FUROSEMIDE 20 MG/1
20 TABLET ORAL
Status: DISCONTINUED | OUTPATIENT
Start: 2024-04-03 | End: 2024-04-06 | Stop reason: HOSPADM

## 2024-04-03 RX ADMIN — FOLIC ACID 1 MG: 1 TABLET ORAL at 09:50

## 2024-04-03 RX ADMIN — INSULIN LISPRO 4 UNITS: 100 INJECTION, SOLUTION INTRAVENOUS; SUBCUTANEOUS at 16:32

## 2024-04-03 RX ADMIN — CARVEDILOL 25 MG: 12.5 TABLET, FILM COATED ORAL at 09:49

## 2024-04-03 RX ADMIN — TRAZODONE HYDROCHLORIDE 150 MG: 100 TABLET ORAL at 22:16

## 2024-04-03 RX ADMIN — ASPIRIN 81 MG: 81 TABLET, CHEWABLE ORAL at 09:49

## 2024-04-03 RX ADMIN — SERTRALINE HYDROCHLORIDE 200 MG: 50 TABLET ORAL at 09:49

## 2024-04-03 RX ADMIN — RISPERIDONE 0.5 MG: 1 SOLUTION ORAL at 22:23

## 2024-04-03 RX ADMIN — LEVOTHYROXINE SODIUM 75 MCG: 0.07 TABLET ORAL at 05:48

## 2024-04-03 RX ADMIN — MIDODRINE HYDROCHLORIDE 2.5 MG: 5 TABLET ORAL at 09:50

## 2024-04-03 RX ADMIN — ONDANSETRON 4 MG: 2 INJECTION INTRAMUSCULAR; INTRAVENOUS at 15:25

## 2024-04-03 RX ADMIN — DANTROLENE SODIUM 25 MG: 25 CAPSULE ORAL at 13:20

## 2024-04-03 RX ADMIN — LEVOFLOXACIN 750 MG: 5 INJECTION, SOLUTION INTRAVENOUS at 11:36

## 2024-04-03 RX ADMIN — CARVEDILOL 25 MG: 12.5 TABLET, FILM COATED ORAL at 22:16

## 2024-04-03 RX ADMIN — INSULIN LISPRO 10 UNITS: 100 INJECTION, SOLUTION INTRAVENOUS; SUBCUTANEOUS at 11:34

## 2024-04-03 RX ADMIN — SODIUM CHLORIDE, PRESERVATIVE FREE 5 ML: 5 INJECTION INTRAVENOUS at 22:18

## 2024-04-03 RX ADMIN — DILTIAZEM HYDROCHLORIDE 60 MG: 30 TABLET ORAL at 22:14

## 2024-04-03 RX ADMIN — FUROSEMIDE 20 MG: 20 TABLET ORAL at 16:31

## 2024-04-03 RX ADMIN — DANTROLENE SODIUM 25 MG: 25 CAPSULE ORAL at 09:49

## 2024-04-03 RX ADMIN — ACETYLCYSTEINE 600 MG: 200 INHALANT RESPIRATORY (INHALATION) at 07:57

## 2024-04-03 RX ADMIN — INSULIN LISPRO 6 UNITS: 100 INJECTION, SOLUTION INTRAVENOUS; SUBCUTANEOUS at 08:06

## 2024-04-03 RX ADMIN — INSULIN GLARGINE 14 UNITS: 100 INJECTION, SOLUTION SUBCUTANEOUS at 08:07

## 2024-04-03 RX ADMIN — MIDODRINE HYDROCHLORIDE 2.5 MG: 5 TABLET ORAL at 22:15

## 2024-04-03 RX ADMIN — FAMOTIDINE 40 MG: 20 TABLET, FILM COATED ORAL at 09:50

## 2024-04-03 RX ADMIN — SODIUM CHLORIDE, PRESERVATIVE FREE 10 ML: 5 INJECTION INTRAVENOUS at 09:50

## 2024-04-03 RX ADMIN — FUROSEMIDE 40 MG: 40 TABLET ORAL at 09:49

## 2024-04-03 RX ADMIN — DANTROLENE SODIUM 25 MG: 25 CAPSULE ORAL at 22:14

## 2024-04-03 RX ADMIN — DILTIAZEM HYDROCHLORIDE 60 MG: 30 TABLET ORAL at 09:49

## 2024-04-03 RX ADMIN — BUDESONIDE 500 MCG: 0.5 INHALANT RESPIRATORY (INHALATION) at 07:57

## 2024-04-03 RX ADMIN — ARFORMOTEROL TARTRATE 15 MCG: 15 SOLUTION RESPIRATORY (INHALATION) at 07:57

## 2024-04-03 NOTE — PLAN OF CARE
Problem: Pain  Goal: Verbalizes/displays adequate comfort level or baseline comfort level  Outcome: Progressing     Problem: Skin/Tissue Integrity  Goal: Absence of new skin breakdown  Description: 1.  Monitor for areas of redness and/or skin breakdown  2.  Assess vascular access sites hourly  3.  Every 4-6 hours minimum:  Change oxygen saturation probe site  4.  Every 4-6 hours:  If on nasal continuous positive airway pressure, respiratory therapy assess nares and determine need for appliance change or resting period.  Outcome: Progressing     Problem: Safety - Adult  Goal: Free from fall injury  Outcome: Progressing     Problem: Chronic Conditions and Co-morbidities  Goal: Patient's chronic conditions and co-morbidity symptoms are monitored and maintained or improved  Outcome: Progressing     Problem: Respiratory - Adult  Goal: Achieves optimal ventilation and oxygenation  4/3/2024 0247 by Wilda Haq, RN  Outcome: Progressing  4/2/2024 1944 by Gabby Acharya RCRAYA  Outcome: Progressing     Problem: Nutrition Deficit:  Goal: Optimize nutritional status  Outcome: Progressing

## 2024-04-03 NOTE — PLAN OF CARE
Problem: Pain  Goal: Verbalizes/displays adequate comfort level or baseline comfort level  Outcome: Progressing     Problem: Skin/Tissue Integrity  Goal: Absence of new skin breakdown  Description: 1.  Monitor for areas of redness and/or skin breakdown  2.  Assess vascular access sites hourly  3.  Every 4-6 hours minimum:  Change oxygen saturation probe site  4.  Every 4-6 hours:  If on nasal continuous positive airway pressure, respiratory therapy assess nares and determine need for appliance change or resting period.  Outcome: Progressing     Problem: Safety - Adult  Goal: Free from fall injury  Outcome: Progressing     Problem: Chronic Conditions and Co-morbidities  Goal: Patient's chronic conditions and co-morbidity symptoms are monitored and maintained or improved  Outcome: Progressing     Problem: Respiratory - Adult  Goal: Achieves optimal ventilation and oxygenation  4/3/2024 1956 by Wilda Haq RN  Outcome: Progressing  4/3/2024 0935 by Viviana Flores, RT  Outcome: Progressing     Problem: Nutrition Deficit:  Goal: Optimize nutritional status  Outcome: Progressing

## 2024-04-03 NOTE — WOUND CARE
Wound Consult:  new consult Visit. Chart reviewed.  Consulted for buttock/heel .  Spoke with patients nurse,  Efren AVILA.  Patient is resting on a chayito bed with KATHY mattress.  Heels off loaded with heel boots.  Patient is awake, alert, cooperative; requires 2 assists to move side to side in bed.  Samuel score 13.  Assessment:  Sacrum- 2x1x0.1cm - moist, pink, no drainage, surrounding blanching redness.    Left heel- 1x1x0.1cm dry, red, resurfaced vs hyperpigmented , pressure,blanches.    Right heel- no redness  Treatment:  Right heel- foam applied  Sacrum- cleansed with NSS, medi honey sheet and foam dressing  Wound Recommendations:  Right heel- foam dressing , QOD  Sacrum- cleanse with NSS, medi honey sheet and foam dressing. Change every other day  Plan:  Spoke with  regarding findings and proposed orders for treatment.  We will continue to reassess  Zehra Angulo RN  [facility], Wound / Ostomy Department  Wound Healing Office [phone]

## 2024-04-03 NOTE — CONSULTS
Clubbing/cyanosis  [x] No edema  [] Other:    Wt Readings from Last 15 Encounters:   03/30/24 70.3 kg (155 lb)   03/15/24 71.2 kg (156 lb 14.4 oz)   05/28/22 74.4 kg (164 lb 0.4 oz)        Current Diet: ADULT TUBE FEEDING; PEG; Other Tube Feeding (specify); Osmolite 1.5; Cyclic; 60; 6:00 AM; 10:00 PM; 120; Q 4 hours; Protein; 1 Dose; Daily  DIET ONE TIME MESSAGE;       PSYCHOSOCIAL/SPIRITUAL SCREENING:   Palliative IDT has assessed this patient for cultural preferences / practices and a referral made as appropriate to needs (Cultural Services, Patient Advocacy, Ethics, etc.)    Spiritual Affiliation: Quaker    Any spiritual / Christianity concerns:  [] Yes /  [x] No  [] Unable to obtain this information  If \"Yes\" to discuss with pastoral care during IDT     Does caregiver feel burdened by caring for their loved one:   [] Yes /  [x] No /  [] No Caregiver Present/Available [] No Caregiver [] Pt Lives at Facility  If \"Yes\" to discuss with social work during IDT    Anticipatory grief assessment:   [x] Normal  / [] Maladaptive   [] Unable to obtain this information  If \"Maladaptive\" to discuss with social work during IDT    ESAS Anxiety: Anxiety Score: Not anxious    ESAS Depression:          LAB AND IMAGING FINDINGS:   Objective data reviewed:  labs, images, records, medication use, vitals, and chart     FINAL COMMENTS   Thank you for allowing Palliative Medicine to participate in the care of Hayder Fink.    Only check if applicable and billing time based rather than MDM  [x] The total encounter time on this service date was _70___ minutes which was spent performing a face-to-face encounter and personally completing the provider-level activities documented in the note. This includes time spent prior to the visit and after the visit in direct care of the patient. This time does not include time spent in any separately reportable services.    Electronically signed by   Tamy Cronin, MSN, FNP-BC, [facility] facility[facility]  Palliative

## 2024-04-03 NOTE — CARE COORDINATION
4/3/2024   CARE MANAGEMENT NOTE:  CM reviewed EMR.  READMISSION:  Pt was admitted to  from 3/15 to 3/21 with met enceph.  Pt was readmitted to  on 3/29 with left pleural effusion.    Reportedly, pt resides with his wife Shavon ([phone]).     RUR 22%; LOS 4 days     Transition Plan of Care:  Pulmonary following for medical management  Consult for home percussion vest received and form was completed by pulmonary NP.   Per RT, pt's chest measurement is 57 inches crosswise.    3.   [facility] provides pt's home Tfs.  [employer] supplied pt's hospital bed  4.   Outpatient follow up  5.   Family will transport pt home in their own w/c vehicle     CM will continue to follow pt until discharged.  Surinder

## 2024-04-03 NOTE — PROGRESS NOTES
complaints    Past Medical History:   Diagnosis Date    AF (paroxysmal atrial fibrillation) (HCC)     Anxiety and depression     COPD (chronic obstructive pulmonary disease) ([facility])     DM type 2 causing vascular disease (HCC)     patients wife states type 1 diabetes    Dysphagia as late effect of stroke     Gastroparesis due to DM (HCC)     Hx of completed stroke     Hyperlipidemia     Hypertension     Hypothyroid     PEG (percutaneous endoscopic gastrostomy) status (HCC)     Rheumatoid arthritis (HCC)       Past Surgical History:   Procedure Laterality Date    AMPUTATION Left     left 5 toes    CARDIAC ELECTROPHYSIOLOGY STUDY AND ABLATION      multiple for a fib    HERNIA REPAIR      TRACHEOSTOMY      reversed Sept 2021      Prior to Admission medications    Medication Sig Start Date End Date Taking? Authorizing Provider   furosemide (LASIX) 40 MG tablet 1 tablet by PEG Tube route daily as needed (Swelling) 3/21/24   Millicent Callejas MD   tiZANidine (ZANAFLEX) 4 MG tablet Take 1 tablet by mouth every 6 hours as needed (legs muscle spasms)    Provider, MD Antonio   famotidine (PEPCID) 40 MG tablet 1 tablet by PEG Tube route daily    Provider, MD Antonio   ALPRAZolam (XANAX) 0.25 MG tablet 1 tablet by Per G Tube route daily as needed for Anxiety.    Provider, MD Antonio   albuterol sulfate HFA (PROVENTIL;VENTOLIN;PROAIR) 108 (90 Base) MCG/ACT inhaler Inhale 2 puffs into the lungs every 4 hours as needed 12/4/22   Automatic Reconciliation, Ar   albuterol (PROVENTIL) (5 MG/ML) 0.5% nebulizer solution Take 0.5 mLs by nebulization every 4 hours as needed for Wheezing    Automatic Reconciliation, Ar   arformoterol tartrate (BROVANA) 15 MCG/2ML NEBU Take 2 mLs by nebulization 2 times daily as needed    Automatic Reconciliation, Ar   aspirin 81 MG chewable tablet 1 tablet by PEG Tube route daily 9/22/22   Automatic Reconciliation, Ar   atorvastatin (LIPITOR) 40 MG tablet 1 tablet by PEG Tube route nightly

## 2024-04-03 NOTE — PROGRESS NOTES
[facility]  [address] [place] [address] [phone]    [facility] Adult  Hospitalist Group                                                                                          Hospitalist Progress Note  Millicent Callejas MD        Date of Service:  4/3/2024  NAME:  Hayder Fink  :  1955  MRN:  [id number]      Admission Summary:   68-year-old male is admitted for multilobar left lung collapse    Interval history / Subjective:   Doing well.  No acute issues or concerns     Assessment & Plan:     Multilobar collapse on left  -Not improved with antibiotics  -Suspected mucous plug  -Pulmonology evaluated and planned for bronchoscopy which was done on  and showed a large amount of thick secretions in the mid trachea and cleared.  Bronchial washings were sent for micro and cytology -preliminary cultures growing Pseudomonas so started on Levaquin.  Also growing a second organism Eikenella corrodens.  Will wait on final sensitivities  -Continue acetylcysteine nebs  -He would benefit from a percussion vest as outpatient to help with secretions    Agitation and sundowning  Anxiety and depression  -Continue sertraline, trazodone, Xanax as needed  -While here added nightly risperidone.  May be beneficial on discharge as well as wife states that he has been sleeping well while in the hospital    Iron deficiency anemia  -Given IV iron  -Received 1 unit PRBC    Diastolic congestive heart failure  -Not in exacerbation but does appear fluid overloaded  -Given IV Lasix here.  1 more dose of IV Lasix tonight prior to starting oral regimen tomorrow  -Echo with preserved ejection fraction but abnormal diastolic function  -Plan to continue his usual 40 mg Lasix via PEG daily and add a low-dose 20 mg Lasix in the evening on discharge    Type 2 diabetes  -Continue with diabetic diet and SSI per protocol  -Recent A1c 8.1  -Continue home Lantus    History of  IntraVENous 2 times per day    sodium chloride flush 0.9 % injection 5-40 mL  5-40 mL IntraVENous PRN    0.9 % sodium chloride infusion   IntraVENous PRN    0.9 % sodium chloride infusion   IntraVENous PRN    ALPRAZolam (XANAX) tablet 0.25 mg  0.25 mg Per G Tube Daily PRN    aspirin chewable tablet 81 mg  81 mg PEG Tube Daily    dantrolene (DANTRIUM) capsule 25 mg  25 mg PEG Tube TID    dilTIAZem (CARDIZEM) tablet 60 mg  60 mg PEG Tube BID    famotidine (PEPCID) tablet 40 mg  40 mg PEG Tube Daily    folic acid (FOLVITE) tablet 1 mg  1 mg Per G Tube Daily    midodrine (PROAMATINE) tablet 2.5 mg  2.5 mg Per G Tube BID    sertraline (ZOLOFT) tablet 200 mg  200 mg PEG Tube Daily    traZODone (DESYREL) tablet 150 mg  150 mg Per G Tube Nightly    arformoterol tartrate (BROVANA) nebulizer solution 15 mcg  15 mcg Nebulization BID RT    budesonide (PULMICORT) nebulizer suspension 500 mcg  0.5 mg Nebulization BID RT    ipratropium 0.5 mg-albuterol 2.5 mg (DUONEB) nebulizer solution 1 Dose  1 Dose Inhalation Q6H PRN    glucose chewable tablet 16 g  4 tablet Oral PRN    dextrose bolus 10% 125 mL  125 mL IntraVENous PRN    Or    dextrose bolus 10% 250 mL  250 mL IntraVENous PRN    glucagon injection 1 mg  1 mg SubCUTAneous PRN    dextrose 10 % infusion   IntraVENous Continuous PRN    sodium chloride flush 0.9 % injection 5-40 mL  5-40 mL IntraVENous 2 times per day    sodium chloride flush 0.9 % injection 5-40 mL  5-40 mL IntraVENous PRN    0.9 % sodium chloride infusion   IntraVENous PRN    ondansetron (ZOFRAN-ODT) disintegrating tablet 4 mg  4 mg Oral Q8H PRN    Or    ondansetron (ZOFRAN) injection 4 mg  4 mg IntraVENous Q6H PRN    polyethylene glycol (GLYCOLAX) packet 17 g  17 g Oral Daily PRN    [Held by provider] enoxaparin (LOVENOX) injection 40 mg  40 mg SubCUTAneous Daily    insulin lispro (HUMALOG) injection vial 0-8 Units  0-8 Units SubCUTAneous TID WC    insulin lispro (HUMALOG) injection vial 0-4 Units  0-4 Units

## 2024-04-04 LAB
BACTERIA SPEC CULT: NORMAL
BACTERIA SPEC CULT: NORMAL
GLUCOSE BLD STRIP.AUTO-MCNC: 165 MG/DL (ref 65–117)
GLUCOSE BLD STRIP.AUTO-MCNC: 254 MG/DL (ref 65–117)
GLUCOSE BLD STRIP.AUTO-MCNC: 256 MG/DL (ref 65–117)
GLUCOSE BLD STRIP.AUTO-MCNC: 333 MG/DL (ref 65–117)
SERVICE CMNT-IMP: ABNORMAL
SERVICE CMNT-IMP: NORMAL
SERVICE CMNT-IMP: NORMAL
TROPONIN I SERPL HS-MCNC: 34 NG/L (ref 0–76)

## 2024-04-04 PROCEDURE — 2580000003 HC RX 258: Performed by: INTERNAL MEDICINE

## 2024-04-04 PROCEDURE — 6370000000 HC RX 637 (ALT 250 FOR IP): Performed by: FAMILY MEDICINE

## 2024-04-04 PROCEDURE — 2700000000 HC OXYGEN THERAPY PER DAY

## 2024-04-04 PROCEDURE — 36415 COLL VENOUS BLD VENIPUNCTURE: CPT

## 2024-04-04 PROCEDURE — 6360000002 HC RX W HCPCS: Performed by: INTERNAL MEDICINE

## 2024-04-04 PROCEDURE — 6370000000 HC RX 637 (ALT 250 FOR IP): Performed by: INTERNAL MEDICINE

## 2024-04-04 PROCEDURE — 94640 AIRWAY INHALATION TREATMENT: CPT

## 2024-04-04 PROCEDURE — 94761 N-INVAS EAR/PLS OXIMETRY MLT: CPT

## 2024-04-04 PROCEDURE — 93005 ELECTROCARDIOGRAM TRACING: CPT | Performed by: FAMILY MEDICINE

## 2024-04-04 PROCEDURE — 6360000002 HC RX W HCPCS: Performed by: NURSE PRACTITIONER

## 2024-04-04 PROCEDURE — 99233 SBSQ HOSP IP/OBS HIGH 50: CPT | Performed by: NURSE PRACTITIONER

## 2024-04-04 PROCEDURE — 84484 ASSAY OF TROPONIN QUANT: CPT

## 2024-04-04 PROCEDURE — 82962 GLUCOSE BLOOD TEST: CPT

## 2024-04-04 PROCEDURE — 1100000000 HC RM PRIVATE

## 2024-04-04 RX ADMIN — ALPRAZOLAM 0.25 MG: 0.5 TABLET ORAL at 20:50

## 2024-04-04 RX ADMIN — DILTIAZEM HYDROCHLORIDE 60 MG: 30 TABLET ORAL at 20:50

## 2024-04-04 RX ADMIN — INSULIN LISPRO 4 UNITS: 100 INJECTION, SOLUTION INTRAVENOUS; SUBCUTANEOUS at 16:37

## 2024-04-04 RX ADMIN — CARVEDILOL 25 MG: 12.5 TABLET, FILM COATED ORAL at 08:08

## 2024-04-04 RX ADMIN — FAMOTIDINE 40 MG: 20 TABLET, FILM COATED ORAL at 08:08

## 2024-04-04 RX ADMIN — SODIUM CHLORIDE, PRESERVATIVE FREE 10 ML: 5 INJECTION INTRAVENOUS at 08:08

## 2024-04-04 RX ADMIN — DANTROLENE SODIUM 25 MG: 25 CAPSULE ORAL at 08:07

## 2024-04-04 RX ADMIN — DANTROLENE SODIUM 25 MG: 25 CAPSULE ORAL at 14:21

## 2024-04-04 RX ADMIN — FOLIC ACID 1 MG: 1 TABLET ORAL at 08:08

## 2024-04-04 RX ADMIN — ASPIRIN 81 MG: 81 TABLET, CHEWABLE ORAL at 08:07

## 2024-04-04 RX ADMIN — CARVEDILOL 25 MG: 12.5 TABLET, FILM COATED ORAL at 20:50

## 2024-04-04 RX ADMIN — SODIUM CHLORIDE, PRESERVATIVE FREE 10 ML: 5 INJECTION INTRAVENOUS at 20:51

## 2024-04-04 RX ADMIN — INSULIN LISPRO 6 UNITS: 100 INJECTION, SOLUTION INTRAVENOUS; SUBCUTANEOUS at 12:15

## 2024-04-04 RX ADMIN — DILTIAZEM HYDROCHLORIDE 60 MG: 30 TABLET ORAL at 08:08

## 2024-04-04 RX ADMIN — LEVOFLOXACIN 750 MG: 5 INJECTION, SOLUTION INTRAVENOUS at 12:17

## 2024-04-04 RX ADMIN — LEVOTHYROXINE SODIUM 75 MCG: 0.07 TABLET ORAL at 06:29

## 2024-04-04 RX ADMIN — TRAZODONE HYDROCHLORIDE 150 MG: 100 TABLET ORAL at 20:50

## 2024-04-04 RX ADMIN — ACETAMINOPHEN 650 MG: 650 SOLUTION ORAL at 20:50

## 2024-04-04 RX ADMIN — IPRATROPIUM BROMIDE AND ALBUTEROL SULFATE 1 DOSE: .5; 3 SOLUTION RESPIRATORY (INHALATION) at 09:24

## 2024-04-04 RX ADMIN — FUROSEMIDE 20 MG: 20 TABLET ORAL at 16:37

## 2024-04-04 RX ADMIN — INSULIN LISPRO 4 UNITS: 100 INJECTION, SOLUTION INTRAVENOUS; SUBCUTANEOUS at 08:06

## 2024-04-04 RX ADMIN — MIDODRINE HYDROCHLORIDE 2.5 MG: 5 TABLET ORAL at 08:08

## 2024-04-04 RX ADMIN — DANTROLENE SODIUM 25 MG: 25 CAPSULE ORAL at 20:50

## 2024-04-04 RX ADMIN — INSULIN GLARGINE 14 UNITS: 100 INJECTION, SOLUTION SUBCUTANEOUS at 08:05

## 2024-04-04 RX ADMIN — ARFORMOTEROL TARTRATE 15 MCG: 15 SOLUTION RESPIRATORY (INHALATION) at 09:24

## 2024-04-04 RX ADMIN — BUDESONIDE 500 MCG: 0.5 INHALANT RESPIRATORY (INHALATION) at 09:24

## 2024-04-04 RX ADMIN — SERTRALINE HYDROCHLORIDE 200 MG: 50 TABLET ORAL at 08:07

## 2024-04-04 RX ADMIN — RISPERIDONE 0.5 MG: 1 SOLUTION ORAL at 20:57

## 2024-04-04 RX ADMIN — ACETYLCYSTEINE 600 MG: 200 INHALANT RESPIRATORY (INHALATION) at 09:24

## 2024-04-04 RX ADMIN — FUROSEMIDE 40 MG: 40 TABLET ORAL at 08:07

## 2024-04-04 ASSESSMENT — PAIN SCALES - GENERAL
PAINLEVEL_OUTOF10: 0
PAINLEVEL_OUTOF10: 0

## 2024-04-04 NOTE — PROGRESS NOTES
[facility].  Mr. Fink was asleep.  No family was present at the time of the visit. Mr. Fink is unable to receive physical communion. Prayer for spiritual communion offered for his well-being.    Chaplain Mohr. JCARLOS Jean, RN, ACSW, LCSW   Page:  [phone]

## 2024-04-04 NOTE — PROGRESS NOTES
[facility] [place] [facility]     Name: Hayder Fink MRN: [id number]   : 1955 Hospital: [facility]   Date: 2024        Impression Plan   Multilobar collapse on left  S/p bronch with BAL   Poor airway protection/weak cough  Dysphagia  Anemia  Hx of CVA  Hx of aspiration  DHF               Follow cytology and cultures- prelim +pseudomonas, Eikenella corrodens, strep  Continue levaquin; of note, contraindication to ciprofloxacin with Tizanidine.  Chest PT  Acetylcysteine nino, brovana, pulmicort  Pt would benefit from a percussion vest as an outpt to help with secretions as pt is unable to expectorate mucous on his own resulting in frequent exacerbations and pneumonia requiring antibiotic therapy in the setting of myopathy and left hemiparesis as a result of prior hemorrhagic stroke-- CM assisting with home set up  Palliative care is following. Pt's wife notes she has a meeting with hospice today.    Ok to discharge from a pulmonary standpoint.          Radiology  ( personally reviewed) CT chest: complete collpase of EFRAÍN and LLL, small left pleural effusion   ABG Invalid input(s): \"PHI\", \"PO2I\", \"PCO2I\"       Subjective     Cc: AMS    69 yo with PMHx of a-fib, COPD, hemorrhagic CVA in  and recurrent aspiration PNA presenting with agitation. CXR read as left pleural effusion. CT chest shows multilobar collapse on the left due to mucus plug in the left mainstem. Wife reports that pt is unable to mount an quality cough and that sputum often seems to get hung up in his throat. He takes all feeds through a PEG tube. Pt is currently on RA and appears comfortable, however wife reports that he has seemed more agitated lately and she feels this may be due to the mucus plug.     24:  Pt alert and receiving neb.  Will answer yes to intermittent questions.  On RA  : pt denies any complaints. Remains on room air. No acute events per nurse.  4/3: no acute events per wife at beside,

## 2024-04-04 NOTE — PROGRESS NOTES
Palliative Medicine  Patient Name: Hayder Fink  YOB: 1955  MRN: [id number]  Age: 68 y.o.  Gender: male    Date of Initial Consult: April 3, 2024  Date of Service: 4/4/2024  Time: 10:44 AM  Provider: Tamy Cronin NP  Hospital Day: 7  Admit Date: 3/29/2024  Referring Provider: Dr. Barrios       Reasons for Consultation:  Goals of Care, Overwhelming Symptoms, Psychosocial Distress, and End Stage Disease    HISTORY OF PRESENT ILLNESS (HPI):   Hayder Fink is a 68 y.o. male  who was admitted on 3/29/2024 from home with a diagnosis of Multilobar left lung collapse with suspected mucous plugging s/p broch with washing 4/1~cytology positive for pseudomonas, hypotension, fvo, elevated bnp but not in exacerbation  Pt with poor airway protection and weak cough  Recent admission 2 weeks ago     PMH: Dchf, COPD, DM, anxiety, depression, iron def anemia, dysphagia (peg), CVA with left hemiparesis, Pafib, hypothyroidism, HLD, aspiration, gastroparesis, hypoalbuminemia, sacral wound stage 2, RA    Psychosocial: pt lives at home with wife. He has a daughter from a previous marriage. Raised in [place] but lived in [place] for over 30 years. Worked in [employer]. [employer].    PALLIATIVE DIAGNOSES:    Shortness of breath  Copious oropharyngeal secretions  Agitation   Feeding difficulties  Palliative Care Encounter    Pertinent Hospital Diagnoses:  Principal Problem:    Pleural effusion  Active Problems:    Hypertension    Dehydration    Elevated brain natriuretic peptide (BNP) level    AF (paroxysmal atrial fibrillation) (HCC)    DM type 2 causing vascular disease (HCC)    Dysphagia as late effect of stroke    Hx of completed stroke    PEG (percutaneous endoscopic gastrostomy) status (HCC)    Rheumatoid arthritis (HCC)    Pressure injury of sacral region, stage 2 (HCC)    Left hemiparesis (HCC)    Hypothyroid    Hyperlipidemia    Gastroparesis due to DM (HCC)    Anxiety and depression     Care Decision Maker and Patient Capacity      Primary Decision Maker: Shavon Fink - Spouse - [phone]  Confirm Advance Directive: None    Current Code Status: DNR     .     Goals of Care and Interventions  Patient/Health Care Proxy Stated Goals: Recovery from acute illness  Medical Interventions: Limited additional interventions  Artificially Administered Nutrition: Feeding tube long-term, if indicated     Please refer to Palliative Medicine ACP notes or assessment and plan above for further details.    PALLIATIVE ASSESSMENT:      Palliative Performance Scale (PPS):  PPS: 20    ECOG:   ECOG Status : Completely disabled [4]    Modified ESAS:  Modified-[place] Symptom Assessment Scale (ESAS)  Tiredness Score: 3  Drowsiness Score: 1  Pain Score: No pain  Anxiety Score: Not anxious  Dyspnea Score: No shortness of breath    Clinical Pain Assessment (nonverbal scale for severity on nonverbal patients):   Clinical Pain Assessment  Severity: 0       NVPS:  Adult Nonverbal Pain Scale (NVPS)  Face: No particular expression or smile  Activity (Movement): Laid quietly, normal position  Guarding: Lying quietly, no positioning of hands over areas of bod  Physiology (Vital Signs): Stable vital signs  Respiratory: Baseline RR/SpO2 compliant with ventilator  NVPS Score : 0           Vital Signs: Blood pressure (!) 144/86, pulse 87, temperature 98.1 °F (36.7 °C), temperature source Axillary, resp. rate 20, height 1.778 m (5' 10\"), weight 70.3 kg (155 lb), SpO2 97 %.    PHYSICAL ASSESSMENT:   General: [] Oriented x3  [] Well appearing  [] Intubated  [x]Ill appearing  []Other:  Mental Status: [] Normal mental status exam  [x] Drowsy  [] Confused  [] Alert and NAD []Other:  Cardiovascular: [] Regular rate/rhythm  [] Arrhythmia  [] Other:   [] deferred  Chest: [x] Effort normal  []Lungs clear  [] Respiratory distress  []Tachypnea  [] Other:  Abdomen: [x] Soft/non-tender  [] Normal appearance  [] Distended  [] Ascites  [] Other:

## 2024-04-04 NOTE — PROGRESS NOTES
[facility]  [address] [place] [address] [phone]    [facility] Adult  Hospitalist Group                                                                                          Hospitalist Progress Note  Millicent Caleljas MD        Date of Service:  2024  NAME:  Hayder Fink  :  1955  MRN:  [id number]      Admission Summary:   68-year-old male is admitted for multilobar left lung collapse    Interval history / Subjective:   Doing well.  No acute issues or concerns     Assessment & Plan:     Multilobar collapse on left  -Suspected mucous plug  -Pulmonology evaluated and planned for bronchoscopy which was done on  and showed a large amount of thick secretions in the mid trachea and cleared.  Bronchial washings were sent for micro and cytology   -preliminary cultures growing Pseudomonas, Eikenella, group F strep which should all be covered by Levaquin.  Will give an additional 7-day course on discharge  -Continue acetylcysteine nebs  -He would benefit from a percussion vest as outpatient to help with secretions.  Patient has been treated with 3 antibiotics since 3/15 and patient has had a chronic cough for over 6 months.    Agitation and sundowning  Anxiety and depression  -Continue sertraline, trazodone, Xanax as needed  -While here added nightly risperidone.  May be beneficial on discharge as well as wife states that he has been sleeping well while in the hospital    Iron deficiency anemia  -Given IV iron  -Received 1 unit PRBC    Diastolic congestive heart failure  -Not in exacerbation but does appear fluid overloaded  -Given IV Lasix here.   -Echo with preserved ejection fraction but abnormal diastolic function  -Plan to continue his usual 40 mg Lasix via PEG daily and add a low-dose 20 mg Lasix in the evening on discharge    Type 2 diabetes  -Continue with diabetic diet and SSI per protocol  -Recent A1c 8.1  -Continue home  Examination:             Constitutional:  No acute distress, cooperative, pleasant    ENT:  Oral mucosa moist, oropharynx benign.    Resp:  CTA bilaterally. No wheezing/rhonchi/rales. No accessory muscle use   CV:  Regular rhythm, normal rate, no murmurs, gallops, rubs    GI:  Soft, non distended, non tender. normoactive bowel sounds, no hepatosplenomegaly     Musculoskeletal:  No edema, warm, 2+ pulses throughout    Neurologic:  Moves all extremities.  AAOx3, CN II-XII reviewed     Psych:  Good insight, Not anxious nor agitated.       Data Review:    Review and/or order of clinical lab test      Labs:     Recent Labs     04/02/24 0412   WBC 7.3   HGB 8.6*   HCT 27.1*   *       Recent Labs     04/02/24 0412 04/03/24  0123    133*   K 4.3 4.1    101   CO2 27 28   BUN 30* 33*       Lab Results   Component Value Date/Time    ALT 17 03/30/2024 12:59 AM    ALT 22 03/29/2024 01:17 PM    ALT 26 12/04/2022 10:12 AM    GLOB 4.3 03/30/2024 12:59 AM    GLOB 4.5 03/29/2024 01:17 PM    GLOB 4.4 12/04/2022 10:12 AM     No results found for: \"INR\", \"APTT\"   Lab Results   Component Value Date/Time    IRON 14 03/29/2024 03:51 PM    TIBC 211 03/29/2024 03:51 PM      No results found for: \"FOL\", \"RBCF\"   No results for input(s): \"PH\", \"PCO2\", \"PO2\" in the last 72 hours.  No results found for: \"CPK\"  Lab Results   Component Value Date/Time    CHOL 64 03/29/2024 03:51 PM    HDL 30 03/29/2024 03:51 PM     No results found for: \"GLUCPOC\"  No results found for: \"UA\"      Medications Reviewed:     Current Facility-Administered Medications   Medication Dose Route Frequency    carvedilol (COREG) tablet 25 mg  25 mg Per G Tube BID    furosemide (LASIX) tablet 20 mg  20 mg Oral Dinner    furosemide (LASIX) tablet 40 mg  40 mg Oral Daily    levoFLOXacin (LEVAQUIN) 750 MG/150ML infusion 750 mg  750 mg IntraVENous Q24H    lidocaine 2 % injection 2 mL  2 mL Nebulization Once    sodium chloride flush 0.9 % injection 5-40 mL

## 2024-04-04 NOTE — PROGRESS NOTES
Nutrition Note    Discussed pt with wife. Wife reports transitioning to Hospice care. Likely d/c on Sat (4/6).     Continue TF at current rate. Currently tolerating per wife. Will continue to follow as appropriate.      ADULT TUBE FEEDING; PEG; Other Tube Feeding (specify); Osmolite 1.5; Cyclic; 60; 6:00 AM; 10:00 PM; 120; Q 4 hours; Protein; 1 Dose; Daily  DIET ONE TIME MESSAGE;    Wife denies any needs or concerns regarding Tube feeding currently.     Electronically signed by Jos Ray RD on 4/4/24 at 2:31 PM EDT    Contact: [phone]

## 2024-04-04 NOTE — PROGRESS NOTES
[facility]  Good Help to Those in Need  [phone]     Patient Name: Hayder Fink  YOB: 1955  Age: 68 y.o.    [facility] Hospice RN Note:  Hospice consult received, reviewing chart. Will follow up with Unit Nurse and Care Manager to discuss plan of care, patient status and discharge disposition w    [facility] is able to accept for home. Chart being reviewed by admin to determine eligibility over coverage for ongoing TF, Chest PT vest, etc.      Spoke to wife Shavon who would like to enroll into hospice services at [place]. She understands that a chest PT vest would likely NOT be covered under hospice and is ok with not having one in the home. We would continue TF as long as patient is able to tolerate them. She understands the risk of aspiration pna and that under hospice we would not be returning to the hospital for aggressive treatment, IV abx, etc     Currently has an OWNED tiffanie lift, hid hospital bed is RENTED so will need to be switched out. Shavon has contacted [facility] to have the bed picked up tomorrow. We will have our DME  delivered as soon as this is taken care of .     We are aiming for a DC date of Saturday. I will touch base with Shavon tomorrow to finalize plans    Thank you for the opportunity to be of service to this patient.   Danna Stevenson RN, BSN, CHPN  Clinical Nurse Liaison  [facility]  [phone] Mobile  [phone] Office   Available on Perfect Serve

## 2024-04-05 LAB
BACTERIA SPEC CULT: ABNORMAL
GLUCOSE BLD STRIP.AUTO-MCNC: 166 MG/DL (ref 65–117)
GLUCOSE BLD STRIP.AUTO-MCNC: 180 MG/DL (ref 65–117)
GLUCOSE BLD STRIP.AUTO-MCNC: 327 MG/DL (ref 65–117)
GLUCOSE BLD STRIP.AUTO-MCNC: 327 MG/DL (ref 65–117)
GRAM STN SPEC: ABNORMAL
SERVICE CMNT-IMP: ABNORMAL

## 2024-04-05 PROCEDURE — 1100000000 HC RM PRIVATE

## 2024-04-05 PROCEDURE — 6370000000 HC RX 637 (ALT 250 FOR IP): Performed by: INTERNAL MEDICINE

## 2024-04-05 PROCEDURE — 6360000002 HC RX W HCPCS: Performed by: INTERNAL MEDICINE

## 2024-04-05 PROCEDURE — 82962 GLUCOSE BLOOD TEST: CPT

## 2024-04-05 PROCEDURE — 94640 AIRWAY INHALATION TREATMENT: CPT

## 2024-04-05 PROCEDURE — 6370000000 HC RX 637 (ALT 250 FOR IP): Performed by: FAMILY MEDICINE

## 2024-04-05 PROCEDURE — 94667 MNPJ CHEST WALL 1ST: CPT

## 2024-04-05 PROCEDURE — 2580000003 HC RX 258: Performed by: INTERNAL MEDICINE

## 2024-04-05 PROCEDURE — 2700000000 HC OXYGEN THERAPY PER DAY

## 2024-04-05 PROCEDURE — 94761 N-INVAS EAR/PLS OXIMETRY MLT: CPT

## 2024-04-05 RX ORDER — LEVOFLOXACIN 750 MG/1
750 TABLET, FILM COATED ORAL EVERY 24 HOURS
Status: DISCONTINUED | OUTPATIENT
Start: 2024-04-05 | End: 2024-04-06 | Stop reason: HOSPADM

## 2024-04-05 RX ADMIN — FUROSEMIDE 20 MG: 20 TABLET ORAL at 17:49

## 2024-04-05 RX ADMIN — IPRATROPIUM BROMIDE AND ALBUTEROL SULFATE 1 DOSE: .5; 3 SOLUTION RESPIRATORY (INHALATION) at 20:30

## 2024-04-05 RX ADMIN — DANTROLENE SODIUM 25 MG: 25 CAPSULE ORAL at 21:01

## 2024-04-05 RX ADMIN — FAMOTIDINE 40 MG: 20 TABLET, FILM COATED ORAL at 10:16

## 2024-04-05 RX ADMIN — TRAZODONE HYDROCHLORIDE 150 MG: 100 TABLET ORAL at 20:59

## 2024-04-05 RX ADMIN — INSULIN LISPRO 6 UNITS: 100 INJECTION, SOLUTION INTRAVENOUS; SUBCUTANEOUS at 12:53

## 2024-04-05 RX ADMIN — FOLIC ACID 1 MG: 1 TABLET ORAL at 10:16

## 2024-04-05 RX ADMIN — MIDODRINE HYDROCHLORIDE 2.5 MG: 5 TABLET ORAL at 10:16

## 2024-04-05 RX ADMIN — ACETYLCYSTEINE 600 MG: 200 INHALANT RESPIRATORY (INHALATION) at 07:15

## 2024-04-05 RX ADMIN — ARFORMOTEROL TARTRATE 15 MCG: 15 SOLUTION RESPIRATORY (INHALATION) at 07:20

## 2024-04-05 RX ADMIN — BUDESONIDE 500 MCG: 0.5 INHALANT RESPIRATORY (INHALATION) at 20:37

## 2024-04-05 RX ADMIN — INSULIN GLARGINE 14 UNITS: 100 INJECTION, SOLUTION SUBCUTANEOUS at 10:16

## 2024-04-05 RX ADMIN — MIDODRINE HYDROCHLORIDE 2.5 MG: 5 TABLET ORAL at 21:00

## 2024-04-05 RX ADMIN — CARVEDILOL 25 MG: 12.5 TABLET, FILM COATED ORAL at 21:00

## 2024-04-05 RX ADMIN — ARFORMOTEROL TARTRATE 15 MCG: 15 SOLUTION RESPIRATORY (INHALATION) at 20:37

## 2024-04-05 RX ADMIN — SODIUM CHLORIDE, PRESERVATIVE FREE 5 ML: 5 INJECTION INTRAVENOUS at 21:03

## 2024-04-05 RX ADMIN — CARVEDILOL 25 MG: 12.5 TABLET, FILM COATED ORAL at 10:15

## 2024-04-05 RX ADMIN — DANTROLENE SODIUM 25 MG: 25 CAPSULE ORAL at 10:16

## 2024-04-05 RX ADMIN — INSULIN LISPRO 6 UNITS: 100 INJECTION, SOLUTION INTRAVENOUS; SUBCUTANEOUS at 17:49

## 2024-04-05 RX ADMIN — SERTRALINE HYDROCHLORIDE 200 MG: 50 TABLET ORAL at 10:16

## 2024-04-05 RX ADMIN — LEVOTHYROXINE SODIUM 75 MCG: 0.07 TABLET ORAL at 06:52

## 2024-04-05 RX ADMIN — DILTIAZEM HYDROCHLORIDE 60 MG: 30 TABLET ORAL at 21:01

## 2024-04-05 RX ADMIN — ACETYLCYSTEINE 600 MG: 200 INHALANT RESPIRATORY (INHALATION) at 20:36

## 2024-04-05 RX ADMIN — ASPIRIN 81 MG: 81 TABLET, CHEWABLE ORAL at 10:16

## 2024-04-05 RX ADMIN — DILTIAZEM HYDROCHLORIDE 60 MG: 30 TABLET ORAL at 10:16

## 2024-04-05 RX ADMIN — LEVOFLOXACIN 750 MG: 750 TABLET, FILM COATED ORAL at 12:53

## 2024-04-05 RX ADMIN — BUDESONIDE 500 MCG: 0.5 INHALANT RESPIRATORY (INHALATION) at 07:20

## 2024-04-05 RX ADMIN — RISPERIDONE 0.5 MG: 1 SOLUTION ORAL at 21:05

## 2024-04-05 RX ADMIN — FUROSEMIDE 40 MG: 40 TABLET ORAL at 10:15

## 2024-04-05 RX ADMIN — DANTROLENE SODIUM 25 MG: 25 CAPSULE ORAL at 17:49

## 2024-04-05 NOTE — CARE COORDINATION
4/5/2024   CARE MANAGEMENT NOTE:  CM reviewed EMR.  READMISSION:  Pt was admitted to  from 3/15 to 3/21 with met enceph.  Pt was readmitted to  on 3/29 with left pleural effusion.    Reportedly, pt resides with his wife Shavon ([phone]).     RUR 20%; LOS 6 days     Transition Plan of Care:  Revised plan is for pt to discharge home on Saturday 4/6 and be admitted to [facility]ice will order all necessary DME and switch out existing DME in the home  CM will no longer need to order a percussion vest (hospice discussed this with pt's wife)  Ambulance transport will be requested for Saturday at 11 a.m.     CM will continue to follow pt until discharged.  Surinder

## 2024-04-05 NOTE — PLAN OF CARE
Problem: Pain  Goal: Verbalizes/displays adequate comfort level or baseline comfort level  Outcome: Progressing     Problem: Skin/Tissue Integrity  Goal: Absence of new skin breakdown  Description: 1.  Monitor for areas of redness and/or skin breakdown  2.  Assess vascular access sites hourly  3.  Every 4-6 hours minimum:  Change oxygen saturation probe site  4.  Every 4-6 hours:  If on nasal continuous positive airway pressure, respiratory therapy assess nares and determine need for appliance change or resting period.  Outcome: Progressing     Problem: Safety - Adult  Goal: Free from fall injury  Outcome: Progressing     Problem: Chronic Conditions and Co-morbidities  Goal: Patient's chronic conditions and co-morbidity symptoms are monitored and maintained or improved  Outcome: Progressing     Problem: Respiratory - Adult  Goal: Achieves optimal ventilation and oxygenation  4/5/2024 1724 by Tammy Drummond RN  Outcome: Progressing  4/5/2024 0857 by Diamante Michael, RT  Outcome: Progressing     Problem: Nutrition Deficit:  Goal: Optimize nutritional status  Outcome: Progressing

## 2024-04-05 NOTE — PROGRESS NOTES
Follow up visit with Mr. Fink on 5 Med surg.  He made good eye contact.  Provided him with words of comfort.  Shared with him how bless I was to see the connection he has with his wife and God.  Provided the [employer].  Provided assurance of prayers for the journey.    Chaplain Samantha Soler MDiv., MS., [facility]  Page a  [phone]

## 2024-04-05 NOTE — SIGNIFICANT EVENT
Pt seen post RRT call due to pt with elevated HR> Pt with known hx of Atrial Fib in past.  EKG per RRT with Afib,- VSS on RRT assessment.     Pt seen at bedside, dozing when I entered, awoken easily with my assessment. Pt currently denying any complaints of pain, SOB, feeling of palpations.     VS at 2010: 164/90. HR 92, RR 26, Temp97.5- currently ausc HR of 72 and irregular, radial pulse with irr beat. Pt back to sleep easily, RR easy and non labored.  Trop 34    NO further interventions, will continue to monitor.

## 2024-04-05 NOTE — PROGRESS NOTES
[facility]  [address] [place] [address] [phone]    [facility] Adult  Hospitalist Group                                                                                          Hospitalist Progress Note  Millicent Callejas MD        Date of Service:  2024  NAME:  Hayder Fink  :  1955  MRN:  [id number]      Admission Summary:   68-year-old male is admitted for multilobar left lung collapse    Interval history / Subjective:   Doing well.  No acute issues or concerns     Assessment & Plan:     Multilobar collapse on left  -Suspected mucous plug  -Pulmonology evaluated and planned for bronchoscopy which was done on  and showed a large amount of thick secretions in the mid trachea and cleared.  Bronchial washings were sent for micro and cytology   -preliminary cultures growing Pseudomonas, Eikenella, group F strep which should all be covered by Levaquin.  Will give an additional 7-day course on discharge  -Continue acetylcysteine nebs    Agitation and owning  Anxiety and depression  -Continue sertraline, trazodone, Xanax as needed  -While here added nightly risperidone.  May be beneficial on discharge as well as wife states that he has been sleeping well while in the hospital    Iron deficiency anemia  -Given IV iron  -Received 1 unit PRBC    Diastolic congestive heart failure  -Not in exacerbation but does appear fluid overloaded  -Given IV Lasix here.   -Echo with preserved ejection fraction but abnormal diastolic function  -Plan to continue his usual 40 mg Lasix via PEG daily and add a low-dose 20 mg Lasix in the evening on discharge    Type 2 diabetes  -Continue with diabetic diet and SSI per protocol  -Recent A1c 8.1  -Continue home Lantus    History of completed stroke  Left hemiparesis  Dysphagia as late effect of stroke s/p gastrostomy  -Continue Flexeril, dantrolene, aspirin and statin  -Continue PEG feedings    Paroxysmal    CV:  Regular rhythm, normal rate, no murmurs, gallops, rubs    GI:  Soft, non distended, non tender. normoactive bowel sounds, no hepatosplenomegaly     Musculoskeletal:  No edema, warm, 2+ pulses throughout    Neurologic:  Moves all extremities.  AAOx3, CN II-XII reviewed     Psych:  Good insight, Not anxious nor agitated.       Data Review:    Review and/or order of clinical lab test      Labs:     No results for input(s): \"WBC\", \"HGB\", \"HCT\", \"PLT\" in the last 72 hours.    Recent Labs     04/03/24  0123   *   K 4.1      CO2 28   BUN 33*       Lab Results   Component Value Date/Time    ALT 17 03/30/2024 12:59 AM    ALT 22 03/29/2024 01:17 PM    ALT 26 12/04/2022 10:12 AM    GLOB 4.3 03/30/2024 12:59 AM    GLOB 4.5 03/29/2024 01:17 PM    GLOB 4.4 12/04/2022 10:12 AM     No results found for: \"INR\", \"APTT\"   Lab Results   Component Value Date/Time    IRON 14 03/29/2024 03:51 PM    TIBC 211 03/29/2024 03:51 PM      No results found for: \"FOL\", \"RBCF\"   No results for input(s): \"PH\", \"PCO2\", \"PO2\" in the last 72 hours.  No results found for: \"CPK\"  Lab Results   Component Value Date/Time    CHOL 64 03/29/2024 03:51 PM    HDL 30 03/29/2024 03:51 PM     No results found for: \"GLUCPOC\"  No results found for: \"UA\"      Medications Reviewed:     Current Facility-Administered Medications   Medication Dose Route Frequency    levoFLOXacin (LEVAQUIN) tablet 750 mg  750 mg Per G Tube Q24H    carvedilol (COREG) tablet 25 mg  25 mg Per G Tube BID    furosemide (LASIX) tablet 20 mg  20 mg Oral Dinner    furosemide (LASIX) tablet 40 mg  40 mg Oral Daily    lidocaine 2 % injection 2 mL  2 mL Nebulization Once    sodium chloride flush 0.9 % injection 5-40 mL  5-40 mL IntraVENous 2 times per day    sodium chloride flush 0.9 % injection 5-40 mL  5-40 mL IntraVENous PRN    0.9 % sodium chloride infusion   IntraVENous PRN    0.9 % sodium chloride infusion   IntraVENous PRN    ALPRAZolam (XANAX) tablet 0.25 mg  0.25 mg Per G

## 2024-04-05 NOTE — PROGRESS NOTES
Rapid Called at 2008    Responded to RRT at 2008 for Tachycardia    Provider at bedside: NO  Interventions ordered: Labs, EKG  Sepsis Suspected: No  Transfer to Higher Level of Care: na    Tele called primary RN due to tachy at 200ish. Primary RN did EKG showing afib. Due to chest pain, Tropo is ordered. Heart sound is irregular. AP will be notified.     Vitals:    04/04/24 2010   BP: (!) 164/90   Pulse: 92   Resp: 26   Temp: 97.5 °F (36.4 °C)   SpO2: 100%        Rapid Ended at 2030  RRT RN assisted with transport to accepting unit CHRISTINE Vasquez RN

## 2024-04-05 NOTE — PROGRESS NOTES
[facility]  Good Help to Those in Need  [phone]    Hospice Nursing PRE-Admission   Discharge Summary  Patient Name: Hayder Fink  YOB: 1955  Age: 68 y.o.       Date of PLANNED Hospice Admission: 24  Hospice Attending: Dr Cid  Primary Care Physician: Fermin Slade MD     Home Hospice Address:[facility] [address] [place] [id number]     Primary Contact and Phone:Shavon Fink [phone]      ADVANCE CARE PLANNING    Code Status: DNR  Durable DNR: [x]  Yes  []  No      2024     1:07 PM   Demographics   Marital Status        Yarsani: Yazidi   Home: TBD    HOSPICE SUMMARY     Verbal CTI of terminal diagnosis with life expectancy of 6 months or less received from: Dr Mendoza    For the Hospice Diagnosis of: late effects of CVA    NCD: please address on admission      CLINICAL INFORMATION   Allergies:   Allergies   Allergen Reactions    Amoxicillin Diarrhea    Amoxicillin-Pot Clavulanate Diarrhea         Currently this patient has:  [] Supplemental O2     [] PICC    [] PORT   [] Campos Catheter [] Ostomy  [] NG Tube  [x] PEG Tube    [x] Wounds               COVID Screening:   Negative      ASSESSMENT & PLAN     1. Symptom Issues Identified: anxiety, pain    2. Spiritual Issues  Identified: none identified on assessment    3.  Psych/ Social/ Emotional Issues Identified: Spouse Shavon is devoted caregiver. Great family support locally. Several family members are in the medical field ( her brother is a hospice MD in [place])              CARE COORDINATION           Hospice Consents: signed and scanned    2. DME Ordered/Company/Delivery Plan: bed with ½ rails, O2 set up to be delivered today Order# [id number] Has owned tiffanie lift in the home        3.   Unique home needs for safety: Bariatric patient  no    4. Symptom Kit and other Medications Needs: SRk ordered for wife to pickup from [facility] pharmacy today       5. Home Admission Reservation: Sat AM

## 2024-04-05 NOTE — PROGRESS NOTES
Pharmacy Dosing Services: 4/5/2024    The pharmacist has determined that this patient meets P & T approved criteria for conversion from IV to oral therapy for the following medication:Levofloxacin      The pharmacist has written the following order for the patient: Levofloxacin 750 mg PO Q24 hrs    The pharmacist will continue to monitor the patient's status and advise the physician if conversion back to IV therapy is recommended.    Thanks,   Cooper Bourgeois, JsD

## 2024-04-06 ENCOUNTER — HOME CARE VISIT (OUTPATIENT)
Facility: HOME HEALTH | Age: 69
End: 2024-04-06
Payer: MEDICARE

## 2024-04-06 VITALS
WEIGHT: 155 LBS | SYSTOLIC BLOOD PRESSURE: 138 MMHG | DIASTOLIC BLOOD PRESSURE: 75 MMHG | RESPIRATION RATE: 15 BRPM | BODY MASS INDEX: 22.19 KG/M2 | OXYGEN SATURATION: 97 % | TEMPERATURE: 98 F | HEIGHT: 70 IN | HEART RATE: 85 BPM

## 2024-04-06 VITALS
SYSTOLIC BLOOD PRESSURE: 142 MMHG | DIASTOLIC BLOOD PRESSURE: 80 MMHG | OXYGEN SATURATION: 92 % | HEART RATE: 99 BPM | RESPIRATION RATE: 18 BRPM

## 2024-04-06 LAB
GLUCOSE BLD STRIP.AUTO-MCNC: 258 MG/DL (ref 65–117)
GLUCOSE BLD STRIP.AUTO-MCNC: 305 MG/DL (ref 65–117)
SERVICE CMNT-IMP: ABNORMAL
SERVICE CMNT-IMP: ABNORMAL

## 2024-04-06 PROCEDURE — 6370000000 HC RX 637 (ALT 250 FOR IP): Performed by: INTERNAL MEDICINE

## 2024-04-06 PROCEDURE — 94761 N-INVAS EAR/PLS OXIMETRY MLT: CPT

## 2024-04-06 PROCEDURE — 2700000000 HC OXYGEN THERAPY PER DAY

## 2024-04-06 PROCEDURE — 6360000002 HC RX W HCPCS: Performed by: INTERNAL MEDICINE

## 2024-04-06 PROCEDURE — 94640 AIRWAY INHALATION TREATMENT: CPT

## 2024-04-06 PROCEDURE — 94669 MECHANICAL CHEST WALL OSCILL: CPT

## 2024-04-06 PROCEDURE — G0299 HHS/HOSPICE OF RN EA 15 MIN: HCPCS

## 2024-04-06 PROCEDURE — 6370000000 HC RX 637 (ALT 250 FOR IP): Performed by: FAMILY MEDICINE

## 2024-04-06 PROCEDURE — 82962 GLUCOSE BLOOD TEST: CPT

## 2024-04-06 RX ORDER — ASPIRIN 81 MG/1
81 TABLET ORAL DAILY
Qty: 90 TABLET | Refills: 0 | Status: SHIPPED | OUTPATIENT
Start: 2024-04-06

## 2024-04-06 RX ORDER — FUROSEMIDE 20 MG/1
20 TABLET ORAL
Qty: 60 TABLET | Refills: 3 | Status: SHIPPED | OUTPATIENT
Start: 2024-04-06

## 2024-04-06 RX ORDER — LEVOFLOXACIN 750 MG/1
750 TABLET, FILM COATED ORAL EVERY 24 HOURS
Qty: 7 TABLET | Refills: 0 | Status: SHIPPED | OUTPATIENT
Start: 2024-04-06 | End: 2024-04-13

## 2024-04-06 RX ORDER — FUROSEMIDE 40 MG/1
40 TABLET ORAL DAILY
Qty: 60 TABLET | Refills: 3 | Status: SHIPPED | OUTPATIENT
Start: 2024-04-07

## 2024-04-06 RX ORDER — RISPERIDONE 1 MG/ML
0.5 SOLUTION ORAL NIGHTLY
Qty: 15 ML | Refills: 3 | Status: SHIPPED | OUTPATIENT
Start: 2024-04-06

## 2024-04-06 RX ORDER — ACETYLCYSTEINE 200 MG/ML
600 SOLUTION ORAL; RESPIRATORY (INHALATION)
Qty: 1 EACH | Refills: 0 | Status: SHIPPED | OUTPATIENT
Start: 2024-04-06

## 2024-04-06 RX ORDER — ATORVASTATIN CALCIUM 40 MG/1
40 TABLET, FILM COATED ORAL DAILY
Qty: 30 TABLET | Refills: 3 | Status: SHIPPED | OUTPATIENT
Start: 2024-04-06

## 2024-04-06 RX ADMIN — DILTIAZEM HYDROCHLORIDE 60 MG: 30 TABLET ORAL at 09:29

## 2024-04-06 RX ADMIN — ASPIRIN 81 MG: 81 TABLET, CHEWABLE ORAL at 09:29

## 2024-04-06 RX ADMIN — FAMOTIDINE 40 MG: 20 TABLET, FILM COATED ORAL at 09:30

## 2024-04-06 RX ADMIN — SERTRALINE HYDROCHLORIDE 200 MG: 50 TABLET ORAL at 09:29

## 2024-04-06 RX ADMIN — CARVEDILOL 25 MG: 12.5 TABLET, FILM COATED ORAL at 09:29

## 2024-04-06 RX ADMIN — LEVOTHYROXINE SODIUM 75 MCG: 0.07 TABLET ORAL at 06:28

## 2024-04-06 RX ADMIN — ARFORMOTEROL TARTRATE 15 MCG: 15 SOLUTION RESPIRATORY (INHALATION) at 07:42

## 2024-04-06 RX ADMIN — MIDODRINE HYDROCHLORIDE 2.5 MG: 5 TABLET ORAL at 09:28

## 2024-04-06 RX ADMIN — INSULIN GLARGINE 14 UNITS: 100 INJECTION, SOLUTION SUBCUTANEOUS at 09:28

## 2024-04-06 RX ADMIN — FUROSEMIDE 40 MG: 40 TABLET ORAL at 09:29

## 2024-04-06 RX ADMIN — ACETYLCYSTEINE 600 MG: 200 INHALANT RESPIRATORY (INHALATION) at 07:42

## 2024-04-06 RX ADMIN — IPRATROPIUM BROMIDE AND ALBUTEROL SULFATE 1 DOSE: .5; 3 SOLUTION RESPIRATORY (INHALATION) at 07:42

## 2024-04-06 RX ADMIN — FOLIC ACID 1 MG: 1 TABLET ORAL at 09:30

## 2024-04-06 RX ADMIN — DANTROLENE SODIUM 25 MG: 25 CAPSULE ORAL at 09:30

## 2024-04-06 RX ADMIN — BUDESONIDE 500 MCG: 0.5 INHALANT RESPIRATORY (INHALATION) at 07:42

## 2024-04-06 RX ADMIN — INSULIN LISPRO 4 UNITS: 100 INJECTION, SOLUTION INTRAVENOUS; SUBCUTANEOUS at 09:28

## 2024-04-06 RX ADMIN — INSULIN LISPRO 3 UNITS: 100 INJECTION, SOLUTION INTRAVENOUS; SUBCUTANEOUS at 11:30

## 2024-04-06 ASSESSMENT — ENCOUNTER SYMPTOMS: CONSTIPATION: 1

## 2024-04-06 NOTE — PLAN OF CARE
Problem: Pain  Goal: Verbalizes/displays adequate comfort level or baseline comfort level  Outcome: Progressing     Problem: Skin/Tissue Integrity  Goal: Absence of new skin breakdown  Description: 1.  Monitor for areas of redness and/or skin breakdown  2.  Assess vascular access sites hourly  3.  Every 4-6 hours minimum:  Change oxygen saturation probe site  4.  Every 4-6 hours:  If on nasal continuous positive airway pressure, respiratory therapy assess nares and determine need for appliance change or resting period.  Outcome: Progressing     Problem: Safety - Adult  Goal: Free from fall injury  Outcome: Progressing     Problem: Chronic Conditions and Co-morbidities  Goal: Patient's chronic conditions and co-morbidity symptoms are monitored and maintained or improved  Outcome: Progressing     Problem: Respiratory - Adult  Goal: Achieves optimal ventilation and oxygenation  Outcome: Progressing     Problem: Nutrition Deficit:  Goal: Optimize nutritional status  Outcome: Progressing

## 2024-04-06 NOTE — PLAN OF CARE
Problem: Pain  Goal: Verbalizes/displays adequate comfort level or baseline comfort level  4/6/2024 1159 by Tammy Drummond RN  Outcome: Completed  4/6/2024 1158 by Tammy Drummond RN  Outcome: Progressing     Problem: Skin/Tissue Integrity  Goal: Absence of new skin breakdown  Description: 1.  Monitor for areas of redness and/or skin breakdown  2.  Assess vascular access sites hourly  3.  Every 4-6 hours minimum:  Change oxygen saturation probe site  4.  Every 4-6 hours:  If on nasal continuous positive airway pressure, respiratory therapy assess nares and determine need for appliance change or resting period.  4/6/2024 1159 by Tammy Drummond RN  Outcome: Completed  4/6/2024 1158 by Tammy Drummond RN  Outcome: Progressing     Problem: Safety - Adult  Goal: Free from fall injury  4/6/2024 1159 by Tammy Drummond RN  Outcome: Completed  4/6/2024 1158 by Tammy Drummond RN  Outcome: Progressing     Problem: Chronic Conditions and Co-morbidities  Goal: Patient's chronic conditions and co-morbidity symptoms are monitored and maintained or improved  4/6/2024 1159 by Tammy Drummond RN  Outcome: Completed  4/6/2024 1158 by Tammy Drummond RN  Outcome: Progressing     Problem: Respiratory - Adult  Goal: Achieves optimal ventilation and oxygenation  4/6/2024 1159 by Tammy Drummond RN  Outcome: Completed  4/6/2024 1158 by Tammy Drummond RN  Outcome: Progressing     Problem: Nutrition Deficit:  Goal: Optimize nutritional status  4/6/2024 1159 by Tammy Drummond RN  Outcome: Completed  4/6/2024 1158 by Tammy Drummond RN  Outcome: Progressing

## 2024-04-06 NOTE — DISCHARGE INSTRUCTIONS
HOSPITALIST DISCHARGE INSTRUCTIONS  NAME:  Hayder Fink   :  1955   MRN:  [id number]     Date/Time:  2024 10:05 AM    ADMIT DATE: 3/29/2024     DISCHARGE DATE: 2024     DISCHARGE DIAGNOSIS:  Pleural effusion    DISCHARGE INSTRUCTIONS:  Thank you for allowing us to participate in your care. Your discharging Hospitalist is Kb Evans MD. You were admitted for evaluation and treatment of the above. You were started on an oral antibiotic and your symptoms improved. You have elected to go home with hospice. They will take over your care from now on      MEDICATIONS:    It is important that you take the medication exactly as they are prescribed.   Keep your medication in the bottles provided by the pharmacist and keep a list of the medication names, dosages, and times to be taken in your wallet.   Do not take other medications without consulting your doctor.             If you experience any of the following symptoms then please call your primary care physician or return to the emergency room if you cannot get hold of your doctor:  Fever, chills, nausea, vomiting, diarrhea, change in mentation, falling, bleeding, shortness of breath    Follow Up:  Please call the below provider to arrange hospital follow up appointment      No follow-up provider specified.      Information obtained by :  I understand that if any problems occur once I am at home I am to contact my physician.    I understand and acknowledge receipt of the instructions indicated above.                                                                                                                                           Physician's or R.N.'s Signature                                                                  Date/Time                                                                                                                                              Patient or Representative Signature

## 2024-04-06 NOTE — PLAN OF CARE
Problem: Pain  Goal: Verbalizes/displays adequate comfort level or baseline comfort level  4/5/2024 2124 by Kim Parish  Outcome: Progressing  4/5/2024 1724 by Tammy Drummond RN  Outcome: Progressing     Problem: Skin/Tissue Integrity  Goal: Absence of new skin breakdown  Description: 1.  Monitor for areas of redness and/or skin breakdown  2.  Assess vascular access sites hourly  3.  Every 4-6 hours minimum:  Change oxygen saturation probe site  4.  Every 4-6 hours:  If on nasal continuous positive airway pressure, respiratory therapy assess nares and determine need for appliance change or resting period.  4/5/2024 2124 by Kim Parish  Outcome: Progressing  4/5/2024 1724 by Tammy Drummond RN  Outcome: Progressing     Problem: Safety - Adult  Goal: Free from fall injury  4/5/2024 2124 by Kim Parish  Outcome: Progressing  4/5/2024 1724 by Tammy Drummond RN  Outcome: Progressing     Problem: Chronic Conditions and Co-morbidities  Goal: Patient's chronic conditions and co-morbidity symptoms are monitored and maintained or improved  4/5/2024 2124 by Kim Parish  Outcome: Progressing  Flowsheets (Taken 4/5/2024 1936)  Care Plan - Patient's Chronic Conditions and Co-Morbidity Symptoms are Monitored and Maintained or Improved: Monitor and assess patient's chronic conditions and comorbid symptoms for stability, deterioration, or improvement  4/5/2024 1724 by Tammy Drummond RN  Outcome: Progressing     Problem: Respiratory - Adult  Goal: Achieves optimal ventilation and oxygenation  4/5/2024 2124 by Kim Parish  Outcome: Progressing  4/5/2024 2033 by Gemini Lacey, RT  Outcome: Progressing  4/5/2024 1724 by Tammy Drummond RN  Outcome: Progressing  4/5/2024 0857 by Diamante Michael, RT  Outcome: Progressing     Problem: Nutrition Deficit:  Goal: Optimize nutritional status  4/5/2024 2124 by Kim Parish  Outcome: Progressing  4/5/2024 1724 by Tammy Drummond  RN  Outcome: Progressing

## 2024-04-06 NOTE — DISCHARGE SUMMARY
Hospitalist Discharge Summary     Patient ID:  Hayder Fink  [id number]  68 y.o.  1955    Admit date: 3/29/2024    Discharge date and time: 4/6/2024    Admission Diagnoses: Pleural effusion [J90]  Elevated brain natriuretic peptide (BNP) level [R79.89]  Lung consolidation (HCC) [J18.1]  Elevated d-dimer [R79.89]  Anemia, unspecified type [D64.9]    Discharge Diagnoses:    Principal Problem:    Pleural effusion  Active Problems:    Hypertension    Dehydration    Elevated brain natriuretic peptide (BNP) level    AF (paroxysmal atrial fibrillation) (HCC)    DM type 2 causing vascular disease (HCC)    Dysphagia as late effect of stroke    Hx of completed stroke    PEG (percutaneous endoscopic gastrostomy) status (HCC)    Rheumatoid arthritis (HCC)    Pressure injury of sacral region, stage 2 (HCC)    Left hemiparesis (HCC)    Hypothyroid    Hyperlipidemia    Gastroparesis due to DM (HCC)    Anxiety and depression    Hypoalbuminemia    Anemia    Iron deficiency    Atrial fibrillation with rapid ventricular response (HCC)  Resolved Problems:    * No resolved hospital problems. *         Hospital Course:   Multilobar collapse on left  - Suspected mucous plug  - Pulmonology evaluated and planned for bronchoscopy which was done on 4/1 and showed a large amount of thick secretions in the mid trachea and cleared.  Bronchial washings were sent for micro and cytology   - preliminary cultures growing Pseudomonas, Eikenella, group F strep which should all be covered by Levaquin.  Will give an additional 7-day course on discharge  -Continue acetylcysteine nebs  -He would benefit from a percussion vest as outpatient to help with secretions.  Patient has been treated with 3 antibiotics since 3/15 and patient has had a chronic cough for over 6 months.     Agitation and sundowning  Anxiety and depression  -Continue sertraline, trazodone, Xanax as needed  -While here added nightly risperidone and discharged on the same      Iron deficiency anemia  - Given IV iron  - Received 1 unit PRBC     Diastolic congestive heart failure: POA. Some fluid overload here. Was given IV lasix. Echo with preserved ejection fraction but abnormal diastolic function  - cont home lasix  - Plan to continue his usual 40 mg Lasix via PEG daily and add a low-dose 20 mg Lasix in the evening on discharge     Type 2 diabetes  - Recent A1c 8.1  - Continue home Lantus     History of completed stroke  Left hemiparesis  Dysphagia as late effect of stroke s/p gastrostomy  - Continue Flexeril, dantrolene  - Cont statin and ASA  - Continue PEG feedings     Paroxysmal A-fib  - cont home meds     Hypotension  - Continue midodrine for blood pressure support     Chronic COPD  - cont home meds     Hypothyroidism  - Continue Synthroid    Imaging  XR CHEST PORTABLE    Result Date: 4/2/2024  Improved aeration of the left upper lobe.    XR CHEST PORTABLE    Result Date: 4/1/2024  1. Unchanged complete collapse of the left lung        PCP: Fermin Slade MD     Consults: pulmonary/intensive care    Condition of patient at discharge: Improved    Discharge Exam:    Physical Exam:    Gen: in no acute distress  HEENT:  Pink conjunctivae, EOMI, hearing intact to voice, moist mucous membranes  Resp: No accessory muscle use, clear breath sounds without wheezes rales or rhonchi  Card: No murmurs, normal S1, S2 without thrills, bruits  Abd:  Soft, non-tender, non-distended  Lymph:  No cervical or inguinal adenopathy  Musc: No cyanosis or clubbing  Skin: No rashes or ulcers, skin turgor is good  Neuro:  hemiplegic. follows commands appropriately  Psych:  Good insight, oriented to person, place and time, alert          Disposition: home    Patient Instructions:   Current Discharge Medication List        START taking these medications    Details   acetylcysteine (MUCOMYST) 20 % nebulizer solution Inhale 3 mLs into the lungs in the morning and 3 mLs in the evening.  Qty: 1 each, Refills:

## 2024-04-07 LAB
EKG DIAGNOSIS: NORMAL
EKG Q-T INTERVAL: 366 MS
EKG QRS DURATION: 68 MS
EKG QTC CALCULATION (BAZETT): 445 MS
EKG R AXIS: 46 DEGREES
EKG T AXIS: 132 DEGREES
EKG VENTRICULAR RATE: 89 BPM

## 2024-04-08 ENCOUNTER — HOME CARE VISIT (OUTPATIENT)
Facility: HOME HEALTH | Age: 69
End: 2024-04-08
Payer: MEDICARE

## 2024-04-08 PROCEDURE — G0156 HHCP-SVS OF AIDE,EA 15 MIN: HCPCS

## 2024-04-08 PROCEDURE — G0299 HHS/HOSPICE OF RN EA 15 MIN: HCPCS

## 2024-04-09 ENCOUNTER — HOME CARE VISIT (OUTPATIENT)
Age: 69
End: 2024-04-09
Payer: MEDICARE

## 2024-04-09 VITALS — HEART RATE: 85 BPM | OXYGEN SATURATION: 95 %

## 2024-04-09 NOTE — HOSPICE
Initial visit made after patient was admitted to [facility] on 4/6. Patient found receiving a bedbath from DEB Gordon with his wife Shavon assisting. Patient is nonverbal but nods his head in response to questions. Patient's cousin Yandy is present and states she also assists with his care. Shavon states patient's last BM was 4-5 days ago. She reports his usual pattern is every 3 days and she administers Miralax via peg tube prn. We review all meds. Shavon states they stopped patient's Synthroid. She reports patient is receiving Lantus 15 units daily as she does not think he requires 22 units. She also reports patient receives Humalog prn for BS over 150. Shavon states she does not follow standard sliding scale orders. She reports patient's am blood sugars average about 175. He has a G7 sensor and Shavon states they will need hospice to provide refills in about 30 days. Shavon states Mucomyst has not ready for  at [facility] as they had to order it. Shavon states she is administering Albuterol and Budesonide until Mucomyst becomes available. Patient has congested lung sounds and Shavon states he requires suctioning at times. Patient has a suction machine which the family has purchased. Shavon states it does not function properly.   Suction machine to be ordered by triage. Triage is contacting local pharmacies for delivery of Mucomyst today.    Patient has a continuous feeding of Osmolite running at 60ml/hr for a total of 960 ml and 140ml water flush every four hours.   Shavon states the feedings run from 6am until 10pm.    This nurse encourages calling [facility] main number anytime as needed with questions or concerns.

## 2024-04-10 ENCOUNTER — HOME CARE VISIT (OUTPATIENT)
Facility: HOME HEALTH | Age: 69
End: 2024-04-10
Payer: MEDICARE

## 2024-04-10 ENCOUNTER — HOME CARE VISIT (OUTPATIENT)
Age: 69
End: 2024-04-10
Payer: MEDICARE

## 2024-04-10 PROCEDURE — G0156 HHCP-SVS OF AIDE,EA 15 MIN: HCPCS

## 2024-04-11 ENCOUNTER — HOME HEALTH/ HOSPICE FACE TO FACE (OUTPATIENT)
Age: 69
End: 2024-04-11

## 2024-04-11 ENCOUNTER — HOME CARE VISIT (OUTPATIENT)
Facility: HOME HEALTH | Age: 69
End: 2024-04-11
Payer: MEDICARE

## 2024-04-11 ENCOUNTER — HOME CARE VISIT (OUTPATIENT)
Age: 69
End: 2024-04-11
Payer: MEDICARE

## 2024-04-11 PROCEDURE — G0299 HHS/HOSPICE OF RN EA 15 MIN: HCPCS

## 2024-04-11 NOTE — HOSPICE
[facility]   Good Help to Those in Need  [phone]     Patient Name:  Hayder Fink  YOB: 1955         Date of Provider Hospice Visit: 04/11/24     Level of Care:   [] General Inpatient (GIP)              [x] Routine                   [] Respite     Current Location of Care: Home      Date of Original Hospice Admission:  4/6/2024  Hospice Medical Director at time of admission: Dr. Jimmy Cid     Principle Hospice Diagnosis:   Late effects of CVA  Diagnoses RELATED to the terminal prognosis: Pleural effusion, hypertension, dehydration, atrial fibrillation, dysphagia, presence of gastrostomy tube, stage II pressure wound to sacrum, left hemiparesis, hypoalbuminemia, gastroparesis, anemia, Watchman procedure.   Other Diagnoses: Hyperlipidemia, hypothyroidism, diabetes mellitus type II      HOSPICE SUMMARY   Hayder Fink is a 67 yo  male admitted to [facility] on routine level of care at home on 4/6/2024. Resides in ground level apartment with spouse Shavon who is primary caregiver.     The patient's principle diagnosis has resulted in weakness/debility, dysphagia with dependence on enteral feedings, increased muscle tone, and dependence for all ADL care.      Functionally, the patient's Karnofsky and/or Palliative Performance Scale is estimated at 30.     The patient is dependent on the following ADLs: all     Objective information that support this patients limited prognosis includes: See note above.       The patient/family chose comfort measures with the support of Hospice.      HOSPICE DIAGNOSES   Active Symptoms:  1.  Late effects CVA  2.  DM  3.  Spasticity  4.  Constipation  5.  Dysphagia  6.  Recent mucous plug  7.  HTN  8.  GERD  9.  CHF  10.  Hypothyroidism  11.  Agitation/restlessness  12.  Anxiety/depression  13.  Insomnia  14.  Hospice care      PLAN   Continue routine level of care at home  Reviewed hospice philosophy and goals of care;

## 2024-04-12 ENCOUNTER — HOME CARE VISIT (OUTPATIENT)
Facility: HOME HEALTH | Age: 69
End: 2024-04-12
Payer: MEDICARE

## 2024-04-12 ENCOUNTER — HOME CARE VISIT (OUTPATIENT)
Age: 69
End: 2024-04-12
Payer: MEDICARE

## 2024-04-12 VITALS — HEART RATE: 110 BPM | DIASTOLIC BLOOD PRESSURE: 63 MMHG | SYSTOLIC BLOOD PRESSURE: 130 MMHG

## 2024-04-12 PROCEDURE — G0156 HHCP-SVS OF AIDE,EA 15 MIN: HCPCS

## 2024-04-12 PROCEDURE — G0299 HHS/HOSPICE OF RN EA 15 MIN: HCPCS

## 2024-04-12 ASSESSMENT — ENCOUNTER SYMPTOMS
CONSTIPATION: 1
BOWEL INCONTINENCE: 1

## 2024-04-12 NOTE — HOSPICE
Joint visit made with ALISSON Kinney. Patient found lying in bed and appears to be sleeping. Patient quickly awakens to tactile and verbal stimuli from his wife Shavon. Patient's cousin Yandy is also present. Skin assessment completed. Patient has some excoriation to his sacrum with one small pinpoint open area. Order received from ALISSON White to apply Triad paste daily. Patient has a partial left foot amputation. There is some redness to the skin. He also has scattered red areas to his back. Order received to apply skin prep to red areas daily. Patient's left arm and abdomen has a large area of fungal rash. Order received for Lotrisone cream to apply two times daily.  Shavon states patient has not had a BM in about one week and she administered Miralax. Education provided on use of a Bisacodyl suppository daily prn. Shavon is able to administer the suppository without difficulty this visit. Order also received for Senna liquid- 5ml via peg tube every other day. We reviewed instructions for all medications. Shavon states she is administering Lantus 15 units daily. Humalog for BS over 150 with an additional 1 unit for every reading above 50.   Mucomyst has arrived in vials. Will need to make another visit to draw up the solution and provide Shavon with prefilled syringes.  Shavon verbalizes understanding and states she will contact [facility] main number anytime as needed with questions or concerns.       Lotrisone and Senna ordered via [employer] for overnight delivery  Skin prep, Triad cram and sacral dressings ordered

## 2024-04-13 NOTE — HOSPICE
PRN visit to teach spouse how to administer Mucomyst nebulization.  Bay is calm, comfortable and compliant.  Shavon has been using Albuterol nebulization which provides only some relief.  Order is for 3ml of medicatin - Mucomyst 20% solution, which may be mixed with saline.  Reviewed medication administration instructions, including 's documents, with no specific guidance regarding amount of saline.  Contacted Medical Director, Jimmy Cid MD - who ordered 3ml of solution may be used with OR without dilution of saline.  Administered medication diluted in 3ml sterile saline.  Patient tolerated well, but grew weary of wearing mask and removed before nebulization was complete.  Shavon Bay usually does this.  She administer tonight without dilution in saline, hopefing administration time will be sufficiently shortened that he will receive full dose.  Shavon Bay has had some difficulty sleeping at nights, some of which seemed related to coughing and congestion.  She will continue to monitor and administer Lorazepam as needed.  She will call as needed.

## 2024-04-15 ENCOUNTER — HOME CARE VISIT (OUTPATIENT)
Facility: HOME HEALTH | Age: 69
End: 2024-04-15
Payer: MEDICARE

## 2024-04-15 ENCOUNTER — HOME CARE VISIT (OUTPATIENT)
Age: 69
End: 2024-04-15
Payer: MEDICARE

## 2024-04-15 PROCEDURE — G0156 HHCP-SVS OF AIDE,EA 15 MIN: HCPCS

## 2024-04-15 PROCEDURE — G0299 HHS/HOSPICE OF RN EA 15 MIN: HCPCS

## 2024-04-16 VITALS — RESPIRATION RATE: 16 BRPM | HEART RATE: 90 BPM | DIASTOLIC BLOOD PRESSURE: 67 MMHG | SYSTOLIC BLOOD PRESSURE: 134 MMHG

## 2024-04-16 ASSESSMENT — ENCOUNTER SYMPTOMS
BOWEL INCONTINENCE: 1
CONSTIPATION: 1

## 2024-04-16 NOTE — HOSPICE
Initial SW Assessment not completed at this time due to inabiity to make contact with pt and spouse. Voicemails have been left on number provided.

## 2024-04-16 NOTE — HOSPICE
Patient found lying in bed with his eyes open. Patient nods in response to questions and denies c/o pain. Patient's wife Shavon reports patient was awake most of the night. She reports patient had a cough and she administered a albuterol breathing treatment. Shavon states patient did not receive Lorazepam last night. She administers Lorazepam 0.5mg now via peg tube. Shavon states she loses some of the med during the crushing process. She reports patient had a large BM after receiving a Bisacodyl suppository last visit. No BM since. Shavon states she administered a Bisacodyl suppository this morning. Patient's rash to his left arm and abdomen has improved and almost healed. Areas left open to air after Lotrisone applied. This nurse reviwed instructions for skin prep and Triad paste. Plan is to apply skin prep to the side of patient's left foot daily. Triad paste to patient's sacrum daily and leave open to air.   New order received from ALISSON Kinney for Lorazepam 2mg/ml- 0.25ml (0.5mg) via peg every six hours prn for anxiety. May administered tabs until Intensol arrives.     Lorazepam liquid and Bisacodyl suppositories ordered via [facility]  supplies: none needed at this time

## 2024-04-18 ENCOUNTER — HOME CARE VISIT (OUTPATIENT)
Age: 69
End: 2024-04-18
Payer: MEDICARE

## 2024-04-18 PROCEDURE — G0156 HHCP-SVS OF AIDE,EA 15 MIN: HCPCS

## 2024-04-19 ENCOUNTER — HOME CARE VISIT (OUTPATIENT)
Facility: HOME HEALTH | Age: 69
End: 2024-04-19
Payer: MEDICARE

## 2024-04-19 PROCEDURE — G0156 HHCP-SVS OF AIDE,EA 15 MIN: HCPCS

## 2024-04-20 ENCOUNTER — HOME CARE VISIT (OUTPATIENT)
Facility: HOME HEALTH | Age: 69
End: 2024-04-20
Payer: MEDICARE

## 2024-04-20 VITALS
SYSTOLIC BLOOD PRESSURE: 112 MMHG | TEMPERATURE: 97.5 F | DIASTOLIC BLOOD PRESSURE: 79 MMHG | RESPIRATION RATE: 20 BRPM | HEART RATE: 63 BPM

## 2024-04-20 PROCEDURE — G0299 HHS/HOSPICE OF RN EA 15 MIN: HCPCS

## 2024-04-20 RX ADMIN — HALOPERIDOL 1 MG: 2 SOLUTION ORAL at 13:30

## 2024-04-20 ASSESSMENT — ENCOUNTER SYMPTOMS: BOWEL INCONTINENCE: 1

## 2024-04-20 NOTE — HOSPICE
PRN visit for increased agitation. Patient received lying in hospital bed, spouse, sister and childhood friend present during visit. Per the spouse and sister, patient was highly agitated at last visit and lorazepam was increased to 1 mg via PEG tube every 3 hours as needed. Patient had good results that night, however agitation symptoms have returned despite increased dosing. Family has not yet given haloperidol. Long conversation with family regarding EOL, agitation and transition, patient appears to be transitioning at this time. Family instructed to give haldol and confirmed via telephone with Dr. BEARD new order for 1 mg haloperidol via PEG tube every 3 hours as needed. Haloperidol was administered and had good effect during visit. Family understands that they can alternate lorazepam and haloperidol if needed but may use haloperidol or lorazepam solely if one has a greater benefit. Family will contact hospice if medication changes are ineffective. Family will continue to give free water boluses every 4 hours via PEG tube. Discussed patient's gastroparesis and family will check residual in the PEG tube prior to administration of water and will hold if there are contents remaining. Family declined further interventions, opting for comfort care at this time. Order received to discontinue tube feeding and PRN Humalog, decrease Lantus to 10 units per day as patient is type 1 diabetic, and family will remove G7 BG monitor. Family verbalized understanding and agreed to stop tube feeding and make insulin changes tomorrow morning as patient has had already had his Lantus today. Patient last BM was 4/19. No medications or supplies needed at this visit. Patient placed on daily visit list for transition.

## 2024-04-21 ENCOUNTER — HOME CARE VISIT (OUTPATIENT)
Facility: HOME HEALTH | Age: 69
End: 2024-04-21
Payer: MEDICARE

## 2024-04-21 ENCOUNTER — HOME CARE VISIT (OUTPATIENT)
Age: 69
End: 2024-04-21
Payer: MEDICARE

## 2024-04-21 VITALS
DIASTOLIC BLOOD PRESSURE: 86 MMHG | RESPIRATION RATE: 22 BRPM | SYSTOLIC BLOOD PRESSURE: 126 MMHG | HEART RATE: 63 BPM | TEMPERATURE: 97.7 F

## 2024-04-21 PROCEDURE — G0299 HHS/HOSPICE OF RN EA 15 MIN: HCPCS

## 2024-04-22 NOTE — HOSPICE
Wife defer assessment as patient was resting quietly with eyes closed. She reported they all slept well last night. She reported medications adjustment been working well. Reviewed nonverbal signs of pain. Wife verbalize understand, she reports no signs of pain. She reports patient is restless, but symptom now managed. Active listening and support provided. Encourage caregiver to call with any concerns

## 2024-04-22 NOTE — HOSPICE
Patient found lying in bed with his eyes closed. Bay does not respond to verbal or tactile stimuli. Spouse Shavon xie Bay was \"lucid\" this morning when a childhood friend visited. Shavon states patient tolerated his bed bath and was alert during that time. Patient is without signs or symptoms of pain or SOB. SpO2 98 % with 02 @ 2 LPM. Shavon xie Bay has not required Morphine.   We reviewed all meds. Shavon states Haldol 1mg and Lorazepam 1mg was not effective She reports patient has been resting and less agitated with Haldol 2mg every four hours. She reports he received Lorazepam 1ml at 9:45 am but Haldol is more effective. Last dose of Haldol received at 7:30am. Shavon states patient is no longer receiving Mucomyst, Albuterol, Dantrolene, Lasix, or  Zoloft.   Order received from ALISSON Kinney to d/c these meds.    Shavon xie patient had a small BM this morning and she changed the dressing to his sacrum. Shavon reports no new areas of skin breakdown. Full skin assessment deferred.   We reviewed EOL care and signs and symptoms of the dying process.  Shavon states she is appreciative of daily SN visits. This nurse encourages calling [facility] main number anytime as needed with questions or concerns.     Haldol and Levsin refilled   supplies are not needed

## 2024-04-24 NOTE — HOSPICE
Patient found lying in bed with his wife Shavon and cousin Yandy present. Bay opens his eyes for a few minutes but appears lethargic. He mouths a few words and Shavon reports Bay has been animated this morning. Yandy states she believes this is a rally and Bay continues to show signs of decline. Shavon states he continues to show signs of agitation and receives Haldol 2 mg every four hours. We reviewed instructions for Morphine as Bay has some facial grimaces. He is unable to describe or rates any pain. Breathing appears even and non labored, Lungs sounds are clear and diminished. Pulse ox machine will not register and Bay's upper extremities are cold. His lips are cyanotic. Yandy is able to draw up Morphine 0.25 ml and administer at this time. They have been administering Lorazepam 2 mg a few times daily. This nurse reminds Shavon and Yandy that Bay can receive Lorazepam 2 mg hourly prn for anxiety. Both verbalize understanding. Shavon states Bay has been pulling his 02 off. This nurse encourages the family to leave the 02 off is Bya becomes anxious when applied. Haldol and Levsin arrived during this visit.  This nurse encourages calling [facility] main number if needed.

## 2024-04-24 NOTE — HOSPICE
wife reports pt has been unresponsive since yesterday when they first started giving him morphine prn. pt also receiving haldol every 4 hours. pt appears comfortable. VS WNL. pt vomited small amount of water during assessment. pt received water via peg tube when receiving medications.  breath sounds clear and diminished.  reminded wife and family to call hospice for any needs

## 2024-07-02 RX ORDER — ATORVASTATIN CALCIUM 40 MG/1
40 TABLET, FILM COATED ORAL DAILY
Qty: 90 TABLET | Refills: 1 | OUTPATIENT
Start: 2024-07-02

## (undated) DEVICE — CATHETER IV 22GA L1IN OD0.8382-0.9144MM ID0.6096-0.6858MM 382523

## (undated) DEVICE — FORCEPS BX L100CM DIA1.8MM WRK CHN 2MM PULM S STL RAD JAW 4

## (undated) DEVICE — SET ADMIN 16ML TBNG L100IN 2 Y INJ SITE IV PIGGY BK DISP (ORDER IN MULIPLES OF 48)

## (undated) DEVICE — ELECTRODE,RADIOTRANSLUCENT,FOAM,3PK: Brand: MEDLINE

## (undated) DEVICE — BRUSH CYTO L140CM DIA1.5MM WRK CHN 2MM BRIST SHTH RNG HNDL

## (undated) DEVICE — BITEBLOCK ENDOSCP 60FR MAXI WHT POLYETH STURDY W/ VELC WVN

## (undated) DEVICE — SYRINGE MED 5ML STD CLR PLAS LUERLOCK TIP N CTRL DISP

## (undated) DEVICE — SOLIDIFIER FLUID 1.3 OZ GEL 1200CC NS

## (undated) DEVICE — SYRINGE MEDICAL 3ML CLEAR PLASTIC STANDARD NON CONTROL LUERLOCK TIP DISPOSABLE

## (undated) DEVICE — BASIN EMSIS 16OZ GRAPHITE PLAS KID SHP MOLD GRAD FOR ORAL

## (undated) DEVICE — BLUNTFILL: Brand: MONOJECT

## (undated) DEVICE — 1200 GUARD II KIT W/5MM TUBE W/O VAC TUBE: Brand: GUARDIAN

## (undated) DEVICE — IV STRT KT 3282] LSL INDUSTRIES INC]

## (undated) DEVICE — KIT COLON W/ 1.1OZ LUB AND 2 END

## (undated) DEVICE — AIRLIFE™ CORRUGATED FLEXIBLE EVA TUBING FOR AEROSOL AND IPPB USE, SEGMENTED, 6 FEET (1.8 M) LENGTH, 22 MM I.D.: Brand: AIRLIFE™

## (undated) DEVICE — CONTAINER SPEC 20 ML LID NEUT BUFF FORMALIN 10 % POLYPR STS

## (undated) DEVICE — SYRINGE MED 10CC ECC TIP W/O NDL

## (undated) DEVICE — BLUNTFILL WITH FILTER: Brand: MONOJECT

## (undated) DEVICE — TRAP,MUCUS SPECIMEN, 80CC: Brand: MEDLINE